# Patient Record
Sex: FEMALE | Race: WHITE | Employment: FULL TIME | URBAN - METROPOLITAN AREA
[De-identification: names, ages, dates, MRNs, and addresses within clinical notes are randomized per-mention and may not be internally consistent; named-entity substitution may affect disease eponyms.]

---

## 2019-04-17 ENCOUNTER — APPOINTMENT (OUTPATIENT)
Dept: GENERAL RADIOLOGY | Age: 59
DRG: 310 | End: 2019-04-17
Payer: COMMERCIAL

## 2019-04-17 ENCOUNTER — HOSPITAL ENCOUNTER (INPATIENT)
Age: 59
LOS: 1 days | Discharge: HOME | DRG: 310 | End: 2019-04-18
Attending: EMERGENCY MEDICINE | Admitting: HOSPITALIST
Payer: COMMERCIAL

## 2019-04-17 DIAGNOSIS — M25.531 RIGHT WRIST PAIN: ICD-10-CM

## 2019-04-17 DIAGNOSIS — R79.89 ELEVATED BRAIN NATRIURETIC PEPTIDE (BNP) LEVEL: ICD-10-CM

## 2019-04-17 DIAGNOSIS — M25.561 ACUTE PAIN OF RIGHT KNEE: ICD-10-CM

## 2019-04-17 DIAGNOSIS — T14.8XXA ABRASION: ICD-10-CM
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
## 2019-04-17 DIAGNOSIS — I48.91 ATRIAL FIBRILLATION WITH RVR (HCC): Primary | ICD-10-CM

## 2019-04-17 LAB
ALBUMIN SERPL-MCNC: 4.2 GM/DL (ref 3.4–5)
ALP BLD-CCNC: 76 IU/L (ref 40–129)
ALT SERPL-CCNC: 18 U/L (ref 10–40)
ANION GAP SERPL CALCULATED.3IONS-SCNC: 10 MMOL/L (ref 4–16)
AST SERPL-CCNC: 23 IU/L (ref 15–37)
BASOPHILS ABSOLUTE: 0 K/CU MM
BASOPHILS RELATIVE PERCENT: 0.3 % (ref 0–1)
BILIRUB SERPL-MCNC: 0.6 MG/DL (ref 0–1)
BUN BLDV-MCNC: 17 MG/DL (ref 6–23)
CALCIUM SERPL-MCNC: 9.5 MG/DL (ref 8.3–10.6)
CHLORIDE BLD-SCNC: 102 MMOL/L (ref 99–110)
CO2: 27 MMOL/L (ref 21–32)
CREAT SERPL-MCNC: 0.8 MG/DL (ref 0.6–1.1)
DIFFERENTIAL TYPE: ABNORMAL
EOSINOPHILS ABSOLUTE: 0.2 K/CU MM
EOSINOPHILS RELATIVE PERCENT: 2.1 % (ref 0–3)
GFR AFRICAN AMERICAN: >60 ML/MIN/1.73M2
GFR NON-AFRICAN AMERICAN: >60 ML/MIN/1.73M2
GLUCOSE BLD-MCNC: 109 MG/DL (ref 70–99)
HCT VFR BLD CALC: 50.9 % (ref 37–47)
HEMOGLOBIN: 16.1 GM/DL (ref 12.5–16)
IMMATURE NEUTROPHIL %: 0.4 % (ref 0–0.43)
LACTATE: 1.4 MMOL/L (ref 0.4–2)
LYMPHOCYTES ABSOLUTE: 2.7 K/CU MM
LYMPHOCYTES RELATIVE PERCENT: 27.8 % (ref 24–44)
MAGNESIUM: 2.1 MG/DL (ref 1.8–2.4)
MCH RBC QN AUTO: 29.6 PG (ref 27–31)
MCHC RBC AUTO-ENTMCNC: 31.6 % (ref 32–36)
MCV RBC AUTO: 93.6 FL (ref 78–100)
MONOCYTES ABSOLUTE: 0.8 K/CU MM
MONOCYTES RELATIVE PERCENT: 8.3 % (ref 0–4)
NUCLEATED RBC %: 0 %
PDW BLD-RTO: 12.2 % (ref 11.7–14.9)
PLATELET # BLD: 257 K/CU MM (ref 140–440)
PMV BLD AUTO: 10 FL (ref 7.5–11.1)
POTASSIUM SERPL-SCNC: 4.5 MMOL/L (ref 3.5–5.1)
PRO-BNP: 1364 PG/ML
RBC # BLD: 5.44 M/CU MM (ref 4.2–5.4)
SEGMENTED NEUTROPHILS ABSOLUTE COUNT: 5.9 K/CU MM
SEGMENTED NEUTROPHILS RELATIVE PERCENT: 61.1 % (ref 36–66)
SODIUM BLD-SCNC: 139 MMOL/L (ref 135–145)
T4 FREE: 1.09 NG/DL (ref 0.9–1.8)
TOTAL CK: 150 IU/L (ref 26–140)
TOTAL IMMATURE NEUTOROPHIL: 0.04 K/CU MM
TOTAL NUCLEATED RBC: 0 K/CU MM
TOTAL PROTEIN: 7.7 GM/DL (ref 6.4–8.2)
TROPONIN T: <0.01 NG/ML
TROPONIN T: <0.01 NG/ML
TSH HIGH SENSITIVITY: 2.62 UIU/ML (ref 0.27–4.2)
WBC # BLD: 9.6 K/CU MM (ref 4–10.5)

## 2019-04-17 PROCEDURE — 96366 THER/PROPH/DIAG IV INF ADDON: CPT

## 2019-04-17 PROCEDURE — 2580000003 HC RX 258: Performed by: NURSE PRACTITIONER

## 2019-04-17 PROCEDURE — 83735 ASSAY OF MAGNESIUM: CPT

## 2019-04-17 PROCEDURE — 6370000000 HC RX 637 (ALT 250 FOR IP): Performed by: EMERGENCY MEDICINE

## 2019-04-17 PROCEDURE — 2500000003 HC RX 250 WO HCPCS: Performed by: EMERGENCY MEDICINE

## 2019-04-17 PROCEDURE — 85025 COMPLETE CBC W/AUTO DIFF WBC: CPT

## 2019-04-17 PROCEDURE — 80053 COMPREHEN METABOLIC PANEL: CPT

## 2019-04-17 PROCEDURE — 84443 ASSAY THYROID STIM HORMONE: CPT

## 2019-04-17 PROCEDURE — 84484 ASSAY OF TROPONIN QUANT: CPT

## 2019-04-17 PROCEDURE — 6360000002 HC RX W HCPCS: Performed by: NURSE PRACTITIONER

## 2019-04-17 PROCEDURE — 83605 ASSAY OF LACTIC ACID: CPT

## 2019-04-17 PROCEDURE — 83880 ASSAY OF NATRIURETIC PEPTIDE: CPT

## 2019-04-17 PROCEDURE — 36415 COLL VENOUS BLD VENIPUNCTURE: CPT

## 2019-04-17 PROCEDURE — 6360000002 HC RX W HCPCS: Performed by: EMERGENCY MEDICINE

## 2019-04-17 PROCEDURE — 90471 IMMUNIZATION ADMIN: CPT | Performed by: EMERGENCY MEDICINE

## 2019-04-17 PROCEDURE — 93005 ELECTROCARDIOGRAM TRACING: CPT | Performed by: EMERGENCY MEDICINE

## 2019-04-17 PROCEDURE — 99285 EMERGENCY DEPT VISIT HI MDM: CPT

## 2019-04-17 PROCEDURE — 84439 ASSAY OF FREE THYROXINE: CPT

## 2019-04-17 PROCEDURE — 82550 ASSAY OF CK (CPK): CPT

## 2019-04-17 PROCEDURE — 73090 X-RAY EXAM OF FOREARM: CPT

## 2019-04-17 PROCEDURE — 73130 X-RAY EXAM OF HAND: CPT

## 2019-04-17 PROCEDURE — 90715 TDAP VACCINE 7 YRS/> IM: CPT | Performed by: EMERGENCY MEDICINE

## 2019-04-17 PROCEDURE — 2140000000 HC CCU INTERMEDIATE R&B

## 2019-04-17 PROCEDURE — 96365 THER/PROPH/DIAG IV INF INIT: CPT

## 2019-04-17 PROCEDURE — 2580000003 HC RX 258: Performed by: EMERGENCY MEDICINE

## 2019-04-17 PROCEDURE — 73560 X-RAY EXAM OF KNEE 1 OR 2: CPT

## 2019-04-17 PROCEDURE — 71045 X-RAY EXAM CHEST 1 VIEW: CPT

## 2019-04-17 RX ORDER — DILTIAZEM HYDROCHLORIDE 5 MG/ML
10 INJECTION INTRAVENOUS ONCE
Status: COMPLETED | OUTPATIENT
Start: 2019-04-17 | End: 2019-04-17

## 2019-04-17 RX ORDER — ACETAMINOPHEN 325 MG/1
650 TABLET ORAL EVERY 4 HOURS PRN
Status: DISCONTINUED | OUTPATIENT
Start: 2019-04-17 | End: 2019-04-18 | Stop reason: HOSPADM

## 2019-04-17 RX ORDER — 0.9 % SODIUM CHLORIDE 0.9 %
1000 INTRAVENOUS SOLUTION INTRAVENOUS ONCE
Status: COMPLETED | OUTPATIENT
Start: 2019-04-17 | End: 2019-04-17

## 2019-04-17 RX ORDER — ONDANSETRON 2 MG/ML
4 INJECTION INTRAMUSCULAR; INTRAVENOUS EVERY 6 HOURS PRN
Status: DISCONTINUED | OUTPATIENT
Start: 2019-04-17 | End: 2019-04-18 | Stop reason: HOSPADM

## 2019-04-17 RX ORDER — ACETAMINOPHEN 500 MG
1000 TABLET ORAL ONCE
Status: COMPLETED | OUTPATIENT
Start: 2019-04-17 | End: 2019-04-17

## 2019-04-17 RX ORDER — DILTIAZEM HYDROCHLORIDE 5 MG/ML
10 INJECTION INTRAVENOUS ONCE
Status: DISCONTINUED | OUTPATIENT
Start: 2019-04-17 | End: 2019-04-17 | Stop reason: CLARIF

## 2019-04-17 RX ORDER — SODIUM CHLORIDE 0.9 % (FLUSH) 0.9 %
10 SYRINGE (ML) INJECTION PRN
Status: DISCONTINUED | OUTPATIENT
Start: 2019-04-17 | End: 2019-04-18 | Stop reason: HOSPADM

## 2019-04-17 RX ORDER — SODIUM CHLORIDE 0.9 % (FLUSH) 0.9 %
10 SYRINGE (ML) INJECTION EVERY 12 HOURS SCHEDULED
Status: DISCONTINUED | OUTPATIENT
Start: 2019-04-17 | End: 2019-04-18 | Stop reason: HOSPADM

## 2019-04-17 RX ORDER — ONDANSETRON 2 MG/ML
4 INJECTION INTRAMUSCULAR; INTRAVENOUS EVERY 30 MIN PRN
Status: DISCONTINUED | OUTPATIENT
Start: 2019-04-17 | End: 2019-04-17 | Stop reason: SDUPTHER

## 2019-04-17 RX ADMIN — ACETAMINOPHEN 1000 MG: 500 TABLET ORAL at 16:24

## 2019-04-17 RX ADMIN — DILTIAZEM HYDROCHLORIDE 5 MG/HR: 5 INJECTION INTRAVENOUS at 16:38

## 2019-04-17 RX ADMIN — DILTIAZEM HYDROCHLORIDE 10 MG: 5 INJECTION INTRAVENOUS at 16:27

## 2019-04-17 RX ADMIN — SODIUM CHLORIDE, PRESERVATIVE FREE 10 ML: 5 INJECTION INTRAVENOUS at 20:39

## 2019-04-17 RX ADMIN — TETANUS TOXOID, REDUCED DIPHTHERIA TOXOID AND ACELLULAR PERTUSSIS VACCINE, ADSORBED 0.5 ML: 5; 2.5; 8; 8; 2.5 SUSPENSION INTRAMUSCULAR at 16:32

## 2019-04-17 RX ADMIN — SODIUM CHLORIDE 1000 ML: 9 INJECTION, SOLUTION INTRAVENOUS at 17:56

## 2019-04-17 RX ADMIN — SODIUM CHLORIDE 1000 ML: 9 INJECTION, SOLUTION INTRAVENOUS at 16:24

## 2019-04-17 RX ADMIN — ENOXAPARIN SODIUM 40 MG: 40 INJECTION SUBCUTANEOUS at 20:38

## 2019-04-17 SDOH — HEALTH STABILITY: MENTAL HEALTH: HOW OFTEN DO YOU HAVE A DRINK CONTAINING ALCOHOL?: NEVER

## 2019-04-17 ASSESSMENT — PAIN DESCRIPTION - PROGRESSION
CLINICAL_PROGRESSION: GRADUALLY IMPROVING
CLINICAL_PROGRESSION: GRADUALLY IMPROVING

## 2019-04-17 ASSESSMENT — PAIN DESCRIPTION - LOCATION
LOCATION: WRIST

## 2019-04-17 ASSESSMENT — PAIN DESCRIPTION - PAIN TYPE
TYPE: ACUTE PAIN

## 2019-04-17 ASSESSMENT — PAIN SCALES - GENERAL
PAINLEVEL_OUTOF10: 2
PAINLEVEL_OUTOF10: 5
PAINLEVEL_OUTOF10: 4
PAINLEVEL_OUTOF10: 5

## 2019-04-17 ASSESSMENT — PAIN DESCRIPTION - DESCRIPTORS
DESCRIPTORS: ACHING

## 2019-04-17 ASSESSMENT — PAIN DESCRIPTION - ORIENTATION
ORIENTATION: RIGHT

## 2019-04-17 ASSESSMENT — ENCOUNTER SYMPTOMS
ALLERGIC/IMMUNOLOGIC NEGATIVE: 1
RESPIRATORY NEGATIVE: 1
EYES NEGATIVE: 1
GASTROINTESTINAL NEGATIVE: 1

## 2019-04-17 ASSESSMENT — PAIN DESCRIPTION - FREQUENCY
FREQUENCY: CONTINUOUS
FREQUENCY: CONTINUOUS

## 2019-04-17 NOTE — ED PROVIDER NOTES
[facility]      TRIAGE CHIEF COMPLAINT:   Other (pt sent from Urgent care due to being in A.fib with RVR; pt denying symptoms such as chest pain, palpitations or shortness of breath, pt states she has history of a.fib but has not been on any medications since July; pt states, \"I had lost my meds in [facility] and just stopped taking it. \" Pt states she was being seen at urgent care due to a previous fall and right wrist pain)      [place]:  Maverick Tariq is a 62 y.o. female that presents with complaint of fall, palpitations. Patient has not been compliant with her Cardizem for a while she states she fell today injuring her right knee and right wrist and hand. Sent here by urgent care due to abnormal heart rhythm. Patient denies any fevers chest pain short of breath nausea vomiting diarrhea headache loss of consciousness blood thinners neck pain back pain other questions or concerns. REVIEW OF SYSTEMS:  At least 10 systems reviewed and otherwise acutely negative except as in the [address] Review of Systems   Constitutional: Negative. HENT: Negative. Eyes: Negative. Respiratory: Negative. Cardiovascular: Positive for palpitations. Gastrointestinal: Negative. Endocrine: Negative. Genitourinary: Negative. Musculoskeletal: Positive for arthralgias and myalgias. Skin: Positive for wound. Allergic/Immunologic: Negative. Neurological: Negative. Hematological: Negative. Psychiatric/Behavioral: Negative. All other systems reviewed and are negative. Past Medical History:   Diagnosis Date    Atrial fibrillation ([address]     Hyperlipidemia      Past Surgical History:   Procedure Laterality Date    APPENDECTOMY       SECTION      CHOLECYSTECTOMY      HYSTERECTOMY       History reviewed. No pertinent family history.   Social History     Socioeconomic History    Marital status:      Spouse name: Not on file    Number of children: Not on file    Years of education: Not on file    Highest education level: Not on file   Occupational History    Not on file   Social Needs    Financial resource strain: Not on file    Food insecurity:     Worry: Not on file     Inability: Not on file    Transportation needs:     Medical: Not on file     Non-medical: Not on file   Tobacco Use    Smoking status: Never Smoker    Smokeless tobacco: Never Used   Substance and Sexual Activity    Alcohol use: Never     Frequency: Never    Drug use: Never    Sexual activity: Not on file   Lifestyle    Physical activity:     Days per week: Not on file     Minutes per session: Not on file    Stress: Not on file   Relationships    Social connections:     Talks on phone: Not on file     Gets together: Not on file     Attends Mormonism service: Not on file     Active member of club or organization: Not on file     Attends meetings of clubs or organizations: Not on file     Relationship status: Not on file    Intimate partner violence:     Fear of current or ex partner: Not on file     Emotionally abused: Not on file     Physically abused: Not on file     Forced sexual activity: Not on file   Other Topics Concern    Not on file   Social History Narrative    Not on file     Current Facility-Administered Medications   Medication Dose Route Frequency Provider Last Rate Last Dose    0.9 % sodium chloride bolus  1,000 mL Intravenous Once Darlina China, DO        0.9 % sodium chloride bolus  1,000 mL Intravenous Once Darlina China, DO 1,000 mL/hr at 04/17/19 1624 1,000 mL at 04/17/19 1624    ondansetron (ZOFRAN) injection 4 mg  4 mg Intravenous Q30 Min PRN Darlina China, DO        diltiazem 125 mg in dextrose 5 % 125 mL infusion  5 mg/hr Intravenous Continuous Justin Noble DO 5 mL/hr at 04/17/19 1638 5 mg/hr at 04/17/19 1638     No current outpatient medications on file.       Allergies   Allergen Reactions    Codeine      Nausea and vomiting      Current She is alert and oriented to person, place, and time. She has normal strength. She is not disoriented. She displays no atrophy and no tremor. No cranial nerve deficit or sensory deficit. She exhibits normal muscle tone. She displays no seizure activity. GCS eye subscore is 4. GCS verbal subscore is 5. GCS motor subscore is 6. Skin: Abrasion noted. She is not diaphoretic. Vitals reviewed.         I have reviewed andinterpreted all of the currently available lab results from this visit (if applicable):    Results for orders placed or performed during the hospital encounter of 04/17/19   CBC auto diff   Result Value Ref Range    WBC 9.6 4.0 - 10.5 K/CU MM    RBC 5.44 (H) 4.2 - 5.4 M/CU MM    Hemoglobin 16.1 (H) 12.5 - 16.0 GM/DL    Hematocrit 50.9 (H) 37 - 47 %    MCV 93.6 78 - 100 FL    MCH 29.6 27 - 31 PG    MCHC 31.6 (L) 32.0 - 36.0 %    RDW 12.2 11.7 - 14.9 %    Platelets 492 995 - 502 K/CU MM    MPV 10.0 7.5 - 11.1 FL    Differential Type AUTOMATED DIFFERENTIAL     Segs Relative 61.1 36 - 66 %    Lymphocytes % 27.8 24 - 44 %    Monocytes % 8.3 (H) 0 - 4 %    Eosinophils % 2.1 0 - 3 %    Basophils % 0.3 0 - 1 %    Segs Absolute 5.9 K/CU MM    Lymphocytes # 2.7 K/CU MM    Monocytes # 0.8 K/CU MM    Eosinophils # 0.2 K/CU MM    Basophils # 0.0 K/CU MM    Nucleated RBC % 0.0 %    Total Nucleated RBC 0.0 K/CU MM    Total Immature Neutrophil 0.04 K/CU MM    Immature Neutrophil % 0.4 0 - 0.43 %   CMP   Result Value Ref Range    Sodium 139 135 - 145 MMOL/L    Potassium 4.5 3.5 - 5.1 MMOL/L    Chloride 102 99 - 110 mMol/L    CO2 27 21 - 32 MMOL/L    BUN 17 6 - 23 MG/DL    CREATININE 0.8 0.6 - 1.1 MG/DL    Glucose 109 (H) 70 - 99 MG/DL    Calcium 9.5 8.3 - 10.6 MG/DL    Alb 4.2 3.4 - 5.0 GM/DL    Total Protein 7.7 6.4 - 8.2 GM/DL    Total Bilirubin 0.6 0.0 - 1.0 MG/DL    ALT 18 10 - 40 U/L    AST 23 15 - 37 IU/L    Alkaline Phosphatase 76 40 - 129 IU/L    GFR Non-African American >60 >60 mL/min/1.73m2    GFR African American >60 >60 mL/min/1.73m2    Anion Gap 10 4 - 16   Troponin   Result Value Ref Range    Troponin T <0.010 <0.01 NG/ML   Lactic Acid, Plasma   Result Value Ref Range    Lactate 1.4 0.4 - 2.0 mMOL/L   CK   Result Value Ref Range    Total  (H) 26 - 140 IU/L   Magnesium   Result Value Ref Range    Magnesium 2.1 1.8 - 2.4 mg/dl   Brain Natriuretic Peptide   Result Value Ref Range    Pro-BNP 1,364 (H) <300 PG/ML   TSH without Reflex   Result Value Ref Range    TSH, High Sensitivity 2.620 0.270 - 4.20 uIu/ml   EKG 12 Lead   Result Value Ref Range    Ventricular Rate 164 BPM    Atrial Rate 119 BPM    QRS Duration 76 ms    Q-T Interval 290 ms    QTc Calculation (Bazett) 478 ms    R Axis 70 degrees    T Axis 51 degrees    Diagnosis       Atrial fibrillation with rapid ventricular response  Abnormal ECG  No previous ECGs available          Radiographs (if obtained):  [] The following radiograph was interpreted by myself in the absence of a radiologist:  [x] Radiologist's Report Reviewed:    CXR, R knee, R hand, forearm    EKG (if obtained): (All EKG's are interpreted by myself in the absence of a cardiologist)    12 lead EKG per my interpretation:  Atrial Fibrillation 164  Axis is   Normal  QTc is  478  There is no specific T wave changes appreciated. There is no specific ST wave changes appreciated. Prior EKG to compare with was not available     Total critical care time today provided was at least 30 minutes. This excludes seperately billable procedure. Critical care time provided for reviewing labs, images consulting medicine giving Cardizem, fluids that required close evaluation and/or intervention with concern for patient decompensation. MDM:    Patient here for fall, right wrist pain and right knee pain x-rays are negative patient given T dap. Also sent here from urgent care due to abnormal heart rhythm. She is in atrial fibrillation with RVR. She has not been taking her Cardizem for a while.   She has no other complaints. Labs x-ray negative except for BNP is elevated. Patient given fluids, Cardizem admitted for observation given patient information with RVR. CLINICAL IMPRESSION:  Final diagnoses:   Atrial fibrillation with RVR ([facility] [address]   Fall, initial encounter   Right wrist pain   Acute pain of right knee   Abrasion   Elevated brain natriuretic peptide (BNP) level       (Please note that portions of this note may have been completed with a voice recognition program. Efforts were made to edit the dictations but occasionally words aremis-transcribed.)    DISPOSITION REFERRAL (if applicable):  No follow-up provider specified.     DISPOSITION MEDICATIONS (if applicable):  New Prescriptions    No medications on file          Yulia Suh, 9 [facility],   04/17/19 2740

## 2019-04-17 NOTE — H&P
History and Physical      Name:  Ward Morgan /Age/Sex: 1960  (62 y.o. female)   MRN & CSN:  [id number] & [id number] Admission Date/Time: 2019  2:55 PM   Location:  ED29/ED-29 PCP: Diana 19 Day: 1        Admitting Physician: Dr. Hahn Self and Plan:   Ward Morgan is a 62 y.o. female who presents rapid heart rate.  Atrial fibrillation with RVR. Presenting ventricular rate 160s    Admit to Med/Surg   IV Cardizem   Telemetry/troponin trend   Consult cardiology. No current long term AC. [id number] Score: 1       Wrist inury, right- 2/2 mechanical fall- imaging non acute    PRN ICE   PRN NSAIDs       Diet Cardiac   DVT Prophylaxis [x] Lovenox, []  Heparin, [] SCDs, [] Ambulation  [] Long term AC   GI Prophylaxis [] PPI,  [x] H2 Blocker,  [] Carafate,  [] Diet/Tube Feeds   Code Status Full     Disposition Admit to inpatient. Patient plans to return home upon discharge   MDM [] Low, [x] Moderate,[]  High     -Patient assessment and plan discussed and reviewed with admitting physician: Dominik Gutierres MD.       History of Present Illness:     Chief Complaint: rapid heart rate    Ward Morgan is a 62 y.o. female who presents with from urgent care after fall. She was there to evaluation wrist injury but was noted to have rapid heart rate and sent directly to ED. She has hx of A Fib with initial diagnosis approx 20 years ago. Has been on Cardizem and stable since then with only 2 episodes of a fib known to the patient. She report work up and no previous evaluation by cardiology. She states stopped taking Cardizem/lovastatin/metformin approx 11 months ago d/t loosing pill bottles during travel. Ten point ROS: reviewed negative, unless as noted in above HPI.     Objective:   No intake or output data in the 24 hours ending 19 1657     Vitals:   Vitals:    19 1453 19 1612 19 1634 19 1650   BP: (!) 120/104 (!) 144/76 103/70 (!) 127/91   Pulse: 168 165 139 110   Resp:    Temp: 98.3 °F (36.8 °C)      TempSrc: Oral      SpO2: 96% 94% 95% 96%   Weight:       Height:           Physical Exam: 19     GEN -Awake nontoxic appearing female, sitting upright in bed , NAD. obese body habitus. Appears given age. EYES -Pupils are equally round. No scleral erythema, discharge, or conjunctivitis. HENT -MM are moist. Oral pharynx without exudates, no evidence of thrush. NECK -Supple, no apparent thyromegaly or masses. RESP -CTA, no wheezes, rales or rhonchi. Symmetric chest movement while on RA.   C/V -S1/S2 auscultated. IRR without appreciable M/R/G. No JVD or carotid bruits. Peripheral pulses equal bilaterally and palpable. Cap refill <3 sec. No peripheral edema. GI -Abdomen is soft non distended and without significant TTP. + BS. No masses or guarding. Rectal exam deferred.  -No CVA/ flank tenderness. Shabazz catheter is not present. LYMPH-No palpable cervical lymphadenopathy and no hepatosplenomegaly. No petechiae or ecchymoses. MS -No gross joint deformities. SKIN -Normal coloration, warm, dry. NEURO-Cranial nerves appear grossly intact, normal speech, no lateralizing weakness. PSYC-Awake, alert, oriented x 4- person, place, time, situation,  Appropriate affect. Past Medical History:      Past Medical History:   Diagnosis Date    Atrial fibrillation ([facility] [address]     Hyperlipidemia        Past Surgical  History:    has a past surgical history that includes Cholecystectomy; Appendectomy;  section; and Hysterectomy.     Social History:    FAM HX: Assessed mother CAD/ A fib    Soc HX:   Social History     Socioeconomic History    Marital status:      Spouse name: None    Number of children: None    Years of education: None    Highest education level: None   Occupational History    None   Social Needs    Financial resource strain: None    Food insecurity:     Worry: None     Inability: None    Transportation needs:     Medical: None     Non-medical: None   Tobacco Use    Smoking status: Never Smoker    Smokeless tobacco: Never Used   Substance and Sexual Activity    Alcohol use: Never     Frequency: Never    Drug use: Never    Sexual activity: None   Lifestyle    Physical activity:     Days per week: None     Minutes per session: None    Stress: None   Relationships    Social connections:     Talks on phone: None     Gets together: None     Attends Hinduism service: None     Active member of club or organization: None     Attends meetings of clubs or organizations: None     Relationship status: None    Intimate partner violence:     Fear of current or ex partner: None     Emotionally abused: None     Physically abused: None     Forced sexual activity: None   Other Topics Concern    None   Social History Narrative    None     TOBACCO:   reports that she has never smoked. She has never used smokeless tobacco.  ETOH:   reports that she does not drink alcohol. Drugs:  reports that she does not use drugs. Allergies: Allergies   Allergen Reactions    Codeine      Nausea and vomiting        Home Medications:     Prior to Admission medications    Not on File         Medications:   Medications:    sodium chloride  1,000 mL Intravenous Once    sodium chloride  1,000 mL Intravenous Once      Infusions:    diltiazem (CARDIZEM) 125 mg in dextrose 5% 125 mL infusion 5 mg/hr (04/17/19 1638)     PRN Meds:   ondansetron 4 mg Q30 Min PRN       Data:     Laboratory this visit:  Reviewed  Recent Labs     04/17/19  1505   WBC 9.6   HGB 16.1*   HCT 50.9*         Recent Labs     04/17/19  1505      K 4.5      CO2 27   BUN 17   CREATININE 0.8     Recent Labs     04/17/19  1505   AST 23   ALT 18   BILITOT 0.6   ALKPHOS 76     No results for input(s): INR in the last 72 hours. Recent Labs     04/17/19  1505   CKTOTAL 150*         Radiology this visit:  Reviewed.     Xr Radius Ulna Right (2 of acute fracture or dislocation. No focal osseous lesion. No evidence of joint effusion. No focal soft tissue abnormality. No acute abnormality of the knee. Xr Chest Portable    Result Date: 4/17/2019  EXAMINATION: SINGLE XRAY VIEW OF THE CHEST 4/17/2019 3:19 pm COMPARISON: None. HISTORY: ORDERING SYSTEM PROVIDED HISTORY: irregular rhythm TECHNOLOGIST PROVIDED HISTORY: Reason for exam:->irregular rhythm Ordering Physician Provided Reason for Exam: irregular rhythm Acuity: Unknown Type of Exam: Initial Additional signs and symptoms: none Relevant Medical/Surgical History: none FINDINGS: No lines or tubes. Normal cardiomediastinal silhouette. The lungs are clear without focal consolidation or pleural effusion. No suspicious pulmonary nodules. No pulmonary edema. No pneumothorax. No acute osseous abnormality. No acute cardiopulmonary disease.          EKG this visit:  Reviewed             Electronically signed by SARA Blair CNP on 4/17/2019 at 4:57 PM

## 2019-04-17 NOTE — LETTER
[address]  [facility] [address]  Phone: [phone]    No name on file. April 18, 2019     Patient: Arturo Woody   YOB: 1960   Date of Visit: 4/17/2019       To Whom It May Concern:    Patient hospitalized from 4/17/19- 4/18/19, can return to work on 4/20/19 without restrictions    If you have any questions or concerns, please don't hesitate to call.     Sincerely,

## 2019-04-17 NOTE — PROGRESS NOTES
Medication History  [facility]    Patient Name: Cuauhtemoc Rivera 1960     Medication history has been completed by: Virgil Falk CPhT    Source(s) of information: daughter     Primary Care Physician: Mayito Oconnor     [facility]    Allergies as of 04/17/2019 - never reviewed   Allergen Reaction Noted    Codeine  04/17/2019        Prior to Admission medications    Not on File     Other Comments:  Patient has not taken any meds since July 2018    To my knowledge the above medication history is accurate as of 4/17/2019 5:14 PM.   Virgil Falk CPhT   4/17/2019 5:14 PM

## 2019-04-17 NOTE — ED NOTES
Pt reports she tripped and fell this morning while walking outside. Pt c/o right hand/wrist pain and abrasion to right knee. Pt went to urgent care for injuries from fall and was noted to be in A. Fib with RVR. Pt then sent to ED. Pt denies any chest pain or SOB. Denies any palpitations. No deformities noted to right hand or wrist. Pt reports she was on Cardizem in the past but hasnt taken it since last July because her luggage was lost and it had her medication in it.  Pt states it took a month to get her luggage back and she never started taking her medication after that     Brando Leon, RN  04/17/19 1903

## 2019-04-17 NOTE — ED NOTES
Pt ambulated to bathroom with gait steady.  Ice pack provided for right hand pain/swelling s/p injury from fall earlier today     Sonal Guerra RN  04/17/19 9436

## 2019-04-17 NOTE — ED NOTES
[place] and lactic acid was drawn on patient  Patient placed on monitor and in gown     Lula Murray  04/17/19 0945

## 2019-04-18 ENCOUNTER — APPOINTMENT (OUTPATIENT)
Dept: NUCLEAR MEDICINE | Age: 59
DRG: 310 | End: 2019-04-18
Payer: COMMERCIAL

## 2019-04-18 VITALS
DIASTOLIC BLOOD PRESSURE: 74 MMHG | BODY MASS INDEX: 32.07 KG/M2 | WEIGHT: 204.3 LBS | HEIGHT: 67 IN | SYSTOLIC BLOOD PRESSURE: 112 MMHG | RESPIRATION RATE: 13 BRPM | HEART RATE: 83 BPM | OXYGEN SATURATION: 99 % | TEMPERATURE: 98.4 F

## 2019-04-18 LAB
ALBUMIN SERPL-MCNC: 3.7 GM/DL (ref 3.4–5)
ALP BLD-CCNC: 61 IU/L (ref 40–128)
ALT SERPL-CCNC: 16 U/L (ref 10–40)
ANION GAP SERPL CALCULATED.3IONS-SCNC: 10 MMOL/L (ref 4–16)
AST SERPL-CCNC: 17 IU/L (ref 15–37)
BILIRUB SERPL-MCNC: 0.7 MG/DL (ref 0–1)
BUN BLDV-MCNC: 17 MG/DL (ref 6–23)
CALCIUM SERPL-MCNC: 8.7 MG/DL (ref 8.3–10.6)
CHLORIDE BLD-SCNC: 106 MMOL/L (ref 99–110)
CO2: 25 MMOL/L (ref 21–32)
CREAT SERPL-MCNC: 0.8 MG/DL (ref 0.6–1.1)
GFR AFRICAN AMERICAN: >60 ML/MIN/1.73M2
GFR NON-AFRICAN AMERICAN: >60 ML/MIN/1.73M2
GLUCOSE BLD-MCNC: 108 MG/DL (ref 70–99)
LV EF: 33 %
LV EF: 50 %
LVEF MODALITY: NORMAL
LVEF MODALITY: NORMAL
MAGNESIUM: 2 MG/DL (ref 1.8–2.4)
POTASSIUM SERPL-SCNC: 4.7 MMOL/L (ref 3.5–5.1)
SODIUM BLD-SCNC: 141 MMOL/L (ref 135–145)
TOTAL PROTEIN: 5.9 GM/DL (ref 6.4–8.2)
TROPONIN T: <0.01 NG/ML

## 2019-04-18 PROCEDURE — 93017 CV STRESS TEST TRACING ONLY: CPT

## 2019-04-18 PROCEDURE — 83735 ASSAY OF MAGNESIUM: CPT

## 2019-04-18 PROCEDURE — A9500 TC99M SESTAMIBI: HCPCS | Performed by: INTERNAL MEDICINE

## 2019-04-18 PROCEDURE — 93010 ELECTROCARDIOGRAM REPORT: CPT | Performed by: INTERNAL MEDICINE

## 2019-04-18 PROCEDURE — 6370000000 HC RX 637 (ALT 250 FOR IP): Performed by: NURSE PRACTITIONER

## 2019-04-18 PROCEDURE — 6360000002 HC RX W HCPCS: Performed by: INTERNAL MEDICINE

## 2019-04-18 PROCEDURE — 78452 HT MUSCLE IMAGE SPECT MULT: CPT

## 2019-04-18 PROCEDURE — 2580000003 HC RX 258: Performed by: INTERNAL MEDICINE

## 2019-04-18 PROCEDURE — 6370000000 HC RX 637 (ALT 250 FOR IP): Performed by: INTERNAL MEDICINE

## 2019-04-18 PROCEDURE — 80053 COMPREHEN METABOLIC PANEL: CPT

## 2019-04-18 PROCEDURE — 2580000003 HC RX 258: Performed by: NURSE PRACTITIONER

## 2019-04-18 PROCEDURE — 99221 1ST HOSP IP/OBS SF/LOW 40: CPT | Performed by: INTERNAL MEDICINE

## 2019-04-18 PROCEDURE — 93306 TTE W/DOPPLER COMPLETE: CPT

## 2019-04-18 PROCEDURE — 2500000003 HC RX 250 WO HCPCS: Performed by: INTERNAL MEDICINE

## 2019-04-18 PROCEDURE — 6360000002 HC RX W HCPCS: Performed by: HOSPITALIST

## 2019-04-18 PROCEDURE — 3430000000 HC RX DIAGNOSTIC RADIOPHARMACEUTICAL: Performed by: INTERNAL MEDICINE

## 2019-04-18 RX ORDER — TRAMADOL HYDROCHLORIDE 50 MG/1
25 TABLET ORAL ONCE
Status: COMPLETED | OUTPATIENT
Start: 2019-04-18 | End: 2019-04-18

## 2019-04-18 RX ORDER — ACETAMINOPHEN 80 MG
TABLET,CHEWABLE ORAL
Status: DISCONTINUED
Start: 2019-04-18 | End: 2019-04-18 | Stop reason: HOSPADM

## 2019-04-18 RX ORDER — DILTIAZEM HYDROCHLORIDE 180 MG/1
180 CAPSULE, COATED, EXTENDED RELEASE ORAL DAILY
Qty: 30 CAPSULE | Refills: 3 | Status: SHIPPED | OUTPATIENT
Start: 2019-04-19 | End: 2019-05-03

## 2019-04-18 RX ORDER — DILTIAZEM HYDROCHLORIDE 5 MG/ML
10 INJECTION INTRAVENOUS ONCE
Status: COMPLETED | OUTPATIENT
Start: 2019-04-18 | End: 2019-04-18

## 2019-04-18 RX ORDER — KETOROLAC TROMETHAMINE 30 MG/ML
15 INJECTION, SOLUTION INTRAMUSCULAR; INTRAVENOUS EVERY 6 HOURS PRN
Status: DISCONTINUED | OUTPATIENT
Start: 2019-04-18 | End: 2019-04-18 | Stop reason: HOSPADM

## 2019-04-18 RX ORDER — DILTIAZEM HYDROCHLORIDE 180 MG/1
180 CAPSULE, COATED, EXTENDED RELEASE ORAL DAILY
Status: DISCONTINUED | OUTPATIENT
Start: 2019-04-18 | End: 2019-04-18 | Stop reason: HOSPADM

## 2019-04-18 RX ORDER — SODIUM CHLORIDE 9 MG/ML
INJECTION, SOLUTION INTRAVENOUS CONTINUOUS
Status: DISCONTINUED | OUTPATIENT
Start: 2019-04-18 | End: 2019-04-18 | Stop reason: HOSPADM

## 2019-04-18 RX ADMIN — ASPIRIN 325 MG: 325 TABLET, DELAYED RELEASE ORAL at 10:37

## 2019-04-18 RX ADMIN — DILTIAZEM HYDROCHLORIDE 180 MG: 180 CAPSULE, COATED, EXTENDED RELEASE ORAL at 10:36

## 2019-04-18 RX ADMIN — SODIUM CHLORIDE, PRESERVATIVE FREE 10 ML: 5 INJECTION INTRAVENOUS at 10:37

## 2019-04-18 RX ADMIN — SODIUM CHLORIDE: 9 INJECTION, SOLUTION INTRAVENOUS at 10:36

## 2019-04-18 RX ADMIN — TRAMADOL HYDROCHLORIDE 25 MG: 50 TABLET, FILM COATED ORAL at 07:05

## 2019-04-18 RX ADMIN — Medication 30 MILLICURIE: at 09:05

## 2019-04-18 RX ADMIN — KETOROLAC TROMETHAMINE 15 MG: 30 INJECTION, SOLUTION INTRAMUSCULAR at 11:23

## 2019-04-18 RX ADMIN — REGADENOSON 0.4 MG: 0.08 INJECTION, SOLUTION INTRAVENOUS at 09:02

## 2019-04-18 RX ADMIN — Medication 10 MILLICURIE: at 07:50

## 2019-04-18 RX ADMIN — DILTIAZEM HYDROCHLORIDE 10 MG: 5 INJECTION INTRAVENOUS at 09:24

## 2019-04-18 RX ADMIN — ACETAMINOPHEN 650 MG: 325 TABLET ORAL at 03:09

## 2019-04-18 ASSESSMENT — PAIN DESCRIPTION - ORIENTATION: ORIENTATION: RIGHT

## 2019-04-18 ASSESSMENT — PAIN DESCRIPTION - PROGRESSION: CLINICAL_PROGRESSION: GRADUALLY WORSENING

## 2019-04-18 ASSESSMENT — PAIN SCALES - GENERAL
PAINLEVEL_OUTOF10: 5
PAINLEVEL_OUTOF10: 4
PAINLEVEL_OUTOF10: 6
PAINLEVEL_OUTOF10: 0

## 2019-04-18 ASSESSMENT — PAIN DESCRIPTION - FREQUENCY: FREQUENCY: CONTINUOUS

## 2019-04-18 ASSESSMENT — PAIN DESCRIPTION - PAIN TYPE: TYPE: ACUTE PAIN

## 2019-04-18 ASSESSMENT — PAIN DESCRIPTION - LOCATION: LOCATION: WRIST

## 2019-04-18 ASSESSMENT — PAIN DESCRIPTION - DESCRIPTORS: DESCRIPTORS: ACHING;CONSTANT

## 2019-04-18 NOTE — DISCHARGE SUMMARY
tablet  Take 1 tablet by mouth daily             diltiazem (CARDIZEM CD) 180 MG extended release capsule  Take 1 capsule by mouth daily                 Objective Findings at Discharge:   /74   Pulse 83   Temp 98.4 °F (36.9 °C) (Oral)   Resp 13   Ht 5' 7\" (1.702 m)   Wt 204 lb 4.8 oz (92.7 kg)   SpO2 99%   BMI 32.00 kg/m²            PHYSICAL EXAM   GEN Awake female, laying in bed in no apparent distress. Appears given age. EYES Pupils are equally round. No scleral discharge  HENT Atraumatic and symmetric head  NECK No apparent thyromegaly  RESP Symmetric chest movement while on room air. CARDIO/VASC Peripheral pulses equal bilaterally and palpable. No peripheral edema. GI Abdomen is not distended. Rectal exam deferred.  Shabazz catheter is not present. HEME/LYMPH No petechiae or ecchymoses. MSK Spontaneous movement of BL upper extremities  SKIN Normal coloration, warm, dry. NEURO Cranial nerves appear grossly intact  PSYCH Awake, alert.     BMP/CBC  Recent Labs     04/17/19  1505 04/18/19  0413    141   K 4.5 4.7    106   CO2 27 25   BUN 17 17   CREATININE 0.8 0.8   WBC 9.6  --    HCT 50.9*  --      --      SIGNIFICANT IMAGING AND LABS:      Discharge Time of 31 minutes    Electronically signed by Soo Anderson MD on 4/18/2019 at 2:22 PM

## 2019-04-18 NOTE — PLAN OF CARE
Problem: Cardiac Output - Decreased:  Goal: Hemodynamic stability will improve  Description  Hemodynamic stability will improve  Outcome: Ongoing     Problem:  Activity:  Goal: Ability to tolerate increased activity will improve  Description  Ability to tolerate increased activity will improve  Outcome: Ongoing  Goal: Expression of feelings of increased energy will increase  Description  Expression of feelings of increased energy will increase  Outcome: Ongoing     Problem: Cardiac:  Goal: Ability to maintain an adequate cardiac output will improve  Description  Ability to maintain an adequate cardiac output will improve  Outcome: Ongoing  Goal: Complications related to the disease process, condition or treatment will be avoided or minimized  Description  Complications related to the disease process, condition or treatment will be avoided or minimized  Outcome: Ongoing     Problem: Coping:  Goal: Level of anxiety will decrease  Description  Level of anxiety will decrease  Outcome: Ongoing  Goal: General experience of comfort will improve  Description  General experience of comfort will improve  Outcome: Ongoing     Problem: Health Behavior:  Goal: Ability to manage health-related needs will improve  Description  Ability to manage health-related needs will improve  Outcome: Ongoing     Problem: Safety:  Goal: Ability to remain free from injury will improve  Description  Ability to remain free from injury will improve  Outcome: Ongoing  Goal: Will show no signs and symptoms of excessive bleeding  Description  Will show no signs and symptoms of excessive bleeding  Outcome: Ongoing     Problem: Infection:  Goal: Will remain free from infection  Description  Will remain free from infection  Outcome: Ongoing     Problem: Daily Care:  Goal: Daily care needs are met  Description  Daily care needs are met  Outcome: Ongoing     Problem: Pain:  Goal: Patient's pain/discomfort is manageable  Description  Patient's pain/discomfort is manageable  Outcome: Ongoing     Problem: Skin Integrity:  Goal: Skin integrity will stabilize  Description  Skin integrity will stabilize  Outcome: Ongoing     Problem: Discharge Planning:  Goal: Patients continuum of care needs are met  Description  Patients continuum of care needs are met  Outcome: Ongoing

## 2019-04-18 NOTE — CARE COORDINATION
LSW spoke with pt about discharge plans Pt lives with her  in a 1 story home. Pt denies using any DME or HC. Pt stated she is independent of all her ADLs. Pt still works full time. Pt has a PCP and can afford her meds. Pt denies any needs and plans home.

## 2019-04-18 NOTE — PROGRESS NOTES
Noted sleep apnea of patient > 25sec, O2 drops into the 70's but spontaneously returns to high 90's on room air.  Placed patient on 2 L O2 via NC.

## 2019-04-18 NOTE — CONSULTS
acetaminophen (TYLENOL) tablet 650 mg Q4H PRN   enoxaparin (LOVENOX) injection 40 mg Nightly     Current Facility-Administered Medications   Medication Dose Route Frequency Provider Last Rate Last Dose    pill splitter             diltiazem 125 mg in dextrose 5 % 125 mL infusion  5 mg/hr Intravenous Continuous Adelfo Ford DO   Stopped at 04/17/19 2322    sodium chloride flush 0.9 % injection 10 mL  10 mL Intravenous 2 times per day Morley Bence, APRN - CNP   10 mL at 04/17/19 2039    sodium chloride flush 0.9 % injection 10 mL  10 mL Intravenous PRN Morley Bence, APRN - CNP        magnesium hydroxide (MILK OF MAGNESIA) 400 MG/5ML suspension 30 mL  30 mL Oral Daily PRN Morley Bence, APRN - CNP        ondansetron [facility]) injection 4 mg  4 mg Intravenous Q6H PRN Morley Bence, APRN - ELLEN        acetaminophen (TYLENOL) tablet 650 mg  650 mg Oral Q4H PRN Morley Bence, APRN - CNP   650 mg at 04/18/19 0309    enoxaparin (LOVENOX) injection 40 mg  40 mg Subcutaneous Nightly Morley Bence, APRN - CNP   40 mg at 04/17/19 2038     Review of Systems:   · Constitutional: No Fever or Weight Loss   · Eyes: No Decreased Vision  · ENT: No Headaches, Hearing Loss or Vertigo  · Cardiovascular: No chest pain, dyspnea on exertion, palpitations or loss of consciousness  · Respiratory: No cough or wheezing    · Gastrointestinal: No abdominal pain, appetite loss, blood in stools, constipation, diarrhea or heartburn  · Genitourinary: No dysuria, trouble voiding, or hematuria  · Musculoskeletal:  No gait disturbance, weakness or joint complaints  · Integumentary: No rash or pruritis  · Neurological: No TIA or stroke symptoms  · Psychiatric: No anxiety or depression  · Endocrine: No malaise, fatigue or temperature intolerance  · Hematologic/Lymphatic: No bleeding problems, blood clots or swollen lymph nodes  · Allergic/Immunologic: No nasal congestion or hives  All systems negative except as marked.      ·   ·      Physical Examination:    Vitals:    04/18/19 0503   BP: 112/74   Pulse: 83   Resp: 13   Temp: 98.4 °F (36.9 °C)   SpO2: 99%      Wt Readings from Last 3 Encounters:   04/18/19 204 lb 4.8 oz (92.7 kg)     Body mass index is 32 kg/m². General Appearance:  No distress, conversant    Constitutional:  Well developed, Well nourished, No acute distress, Non-toxic appearance. HENT:  Normocephalic, Atraumatic, Bilateral external ears normal, Oropharynx moist, No oral exudates, Nose normal. Neck- Normal range of motion, No tenderness, Supple, No stridor,no apical-carotid delay, no carotid bruit  Eyes:  PERRL, EOMI, Conjunctiva normal, No discharge. Respiratory:  Normal breath sounds, No respiratory distress, No wheezing, No chest tenderness. ,no use of accessory muscles, diaphragm movement is normal  Cardiovascular: (PMI) apex non displaced,no lifts no thrills, no s3,no s4, Normal heart rate, Normal rhythm, No murmurs, No rubs, No gallops. Carotid arteries pulse and amplitude are normal no bruit, no abdominal bruit noted ( normal abdominal aorta ausculation), femoral arteries pulse and amplitude are normal no bruit, pedal pulses are normal  GI:  Bowel sounds normal, Soft, No tenderness, No masses, No pulsatile masses, no hepatosplenomegally, no bruits  : External genitalia appear normal, No masses or lesions. No discharge. No CVA tenderness. Musculoskeletal:  Intact distal pulses, No edema, No tenderness, No cyanosis, No clubbing. Good range of motion in all major joints. No tenderness to palpation or major deformities noted. Back- No tenderness. Integument:  Warm, Dry, No erythema, No rash. Skin: no rash, no ulcers  Lymphatic:  No lymphadenopathy noted. Neurologic:  Alert & oriented x 3, Normal motor function, Normal sensory function, No focal deficits noted.    Psychiatric:  Affect normal, Judgment normal, Mood normal.   Lab Review   Recent Labs     04/17/19  1505   WBC 9.6   HGB 16.1*   HCT 50.9*

## 2019-04-18 NOTE — PROGRESS NOTES
Perfect Serve sent to Dr. Douglas Zimmerman, patient is new consult for Afib with RVR up from ER at shift change. Cardizem gtt had been titrated down, SBP not sustaining @ 90/40's. Finally stopped gtt @ 2322. Awaiting further orders if any tonight.

## 2019-04-22 LAB
EKG ATRIAL RATE: 119 BPM
EKG DIAGNOSIS: NORMAL
EKG Q-T INTERVAL: 290 MS
EKG QRS DURATION: 76 MS
EKG QTC CALCULATION (BAZETT): 478 MS
EKG R AXIS: 70 DEGREES
EKG T AXIS: 51 DEGREES
EKG VENTRICULAR RATE: 164 BPM

## 2019-05-03 ENCOUNTER — OFFICE VISIT (OUTPATIENT)
Dept: CARDIOLOGY CLINIC | Age: 59
End: 2019-05-03
Payer: COMMERCIAL

## 2019-05-03 ENCOUNTER — NURSE ONLY (OUTPATIENT)
Dept: CARDIOLOGY CLINIC | Age: 59
End: 2019-05-03
Payer: COMMERCIAL

## 2019-05-03 VITALS
WEIGHT: 200.4 LBS | SYSTOLIC BLOOD PRESSURE: 138 MMHG | HEIGHT: 67 IN | HEART RATE: 107 BPM | BODY MASS INDEX: 31.45 KG/M2 | DIASTOLIC BLOOD PRESSURE: 80 MMHG

## 2019-05-03 DIAGNOSIS — I48.91 ATRIAL FIBRILLATION, UNSPECIFIED TYPE (HCC): ICD-10-CM

## 2019-05-03 DIAGNOSIS — I48.91 ATRIAL FIBRILLATION, UNSPECIFIED TYPE (HCC): Primary | ICD-10-CM

## 2019-05-03 PROCEDURE — 99213 OFFICE O/P EST LOW 20 MIN: CPT | Performed by: INTERNAL MEDICINE

## 2019-05-03 PROCEDURE — 93228 REMOTE 30 DAY ECG REV/REPORT: CPT | Performed by: INTERNAL MEDICINE

## 2019-05-03 PROCEDURE — 93000 ELECTROCARDIOGRAM COMPLETE: CPT | Performed by: INTERNAL MEDICINE

## 2019-05-03 RX ORDER — DILTIAZEM HYDROCHLORIDE 240 MG/1
240 CAPSULE, COATED, EXTENDED RELEASE ORAL DAILY
Qty: 180 CAPSULE | Refills: 3 | Status: ON HOLD | OUTPATIENT
Start: 2019-05-03 | End: 2019-09-08 | Stop reason: HOSPADM

## 2019-05-03 NOTE — PROGRESS NOTES
14 days e-cardio monitor placed.  # [id number]. Instructed patient on how to record the event and to call monitoring center at [phone] if any problems arise. Instructed patient to disconnect the lead wires from the electrodes before bathing or showering and reattach them afterwards. Instructed patient that the electrodes should be changed every 3 days or if they no longer adhere to the skin. Patient to mail package after the monitor has ended. Patient verbalized understanding.   Applied by [employer]

## 2019-05-03 NOTE — PROGRESS NOTES
Enedelia Garcia MD        OFFICE  FOLLOWUP NOTE    Chief complaints: patient is here for management of afib, obesity, dyslipidemia    Subjective: patient feels better, no chest pain, no shortness of breath, no dizziness, no palpitations    HPI Ayesha Thompson is a 62 y. o.year old who  has a past medical history of Atrial fibrillation ([facility] [address] and Hyperlipidemia. and presents for management of  afib, obesity, dyslipidemia which are well controlled, she is here for follow up after hostpial discharge, she was trreated with aspirin and cardizem for her afib ( she was not cardioverted as her heart rate was controlled), however, today her heart rate is 117, she has noticed normal heart rate at home and thinks its her anxiety which is causing fast heart rate,      Current Outpatient Medications   Medication Sig Dispense Refill    diltiazem (CARDIZEM CD) 240 MG extended release capsule Take 1 capsule by mouth daily 180 capsule 3    aspirin 325 MG EC tablet Take 1 tablet by mouth daily 30 tablet 3     No current facility-administered medications for this visit.       Allergies: Codeine  Past Medical History:   Diagnosis Date    Atrial fibrillation ([facility] [address]     Hyperlipidemia      Past Surgical History:   Procedure Laterality Date    APPENDECTOMY       SECTION      CHOLECYSTECTOMY      HYSTERECTOMY       Family History   Problem Relation Age of Onset    High Cholesterol Mother     Heart Surgery Mother     Hypertension Father      Social History     Tobacco Use    Smoking status: Never Smoker    Smokeless tobacco: Never Used   Substance Use Topics    Alcohol use: Never     Frequency: Never      [unfilled]  Review of Systems:   · Constitutional: No Fever or Weight Loss   · Eyes: No Decreased Vision  · ENT: No Headaches, Hearing Loss or Vertigo  · Cardiovascular: No chest pain, dyspnea on exertion, palpitations or loss of consciousness  · Respiratory: No cough or wheezing    · Gastrointestinal: No abdominal pain, appetite loss, blood in stools, constipation, diarrhea or heartburn  · Genitourinary: No dysuria, trouble voiding, or hematuria  · Musculoskeletal:  No gait disturbance, weakness or joint complaints  · Integumentary: No rash or pruritis  · Neurological: No TIA or stroke symptoms  · Psychiatric: No anxiety or depression  · Endocrine: No malaise, fatigue or temperature intolerance  · Hematologic/Lymphatic: No bleeding problems, blood clots or swollen lymph nodes  · Allergic/Immunologic: No nasal congestion or hives  All systems negative except as marked. Objective:  /80   Pulse 107   Ht 5' 7\" (1.702 m)   Wt 200 lb 6.4 oz (90.9 kg)   BMI 31.39 kg/m²   Wt Readings from Last 3 Encounters:   05/03/19 200 lb 6.4 oz (90.9 kg)   04/18/19 204 lb 4.8 oz (92.7 kg)     Body mass index is 31.39 kg/m². GENERAL - Alert, oriented, pleasant, in no apparent distress,normal grooming  HEENT - pupils are reactive to light and accomodation, cornea intact, conjunctive normal color, ears are normal in exam,throat exam in normal, teeth, gum and palate are normal, oral mucosa is normal without any notation of pallor or cyanosis  Neck - Supple. No jugular venous distention noted. No carotid bruits, no apical -carotid delay  Respiratory:  Normal breath sounds, No respiratory distress, No wheezing, No chest tenderness. ,no use of accessory muscles, diaphragm movement is normal  Cardiovascular: (PMI) apex non displaced,no lifts no thrills, no s3,no s4, Normal heart rate, Normal rhythm, No murmurs, No rubs, No gallops.  Carotid arteries pulse and amplitude are normal no bruit, no abdominal bruit noted ( normal abdominal aorta ausculation), femoral arteries pulse and amplitude are normal no bruit, pedal pulses are normal  Femoral pulses have normal amplitude, no bruits   Extremities - No cyanosis, clubbing, or significant edema, no varicose veins    Abdomen - No masses, tenderness, or organomegaly, no hepato-splenomegally, no bruits  Musculoskeletal - No significant edema, no kyphosis or scoliosis, no deformity in any extremity noted, muscle strength and tone are normal  Skin: no ulcer,no scar,no stasis dermatitis   Neurologic - alert oriented times 3,Cranial nerves II through XII are grossly intact. There were no gross focal neurologic abnormalities. All sensory and motor nerves examined and were normal  Psychiatric: normal mood and affect    Lab Results   Component Value Date    CKTOTAL 150 04/17/2019     BNP:  No results found for: BNP  PT/INR:  No results found for: PTINR  No results found for: LABA1C  Lab Results   Component Value Date    CHOL 223 (H) 12/18/2012    TRIG 207 (H) 12/18/2012    HDL 55 (L) 12/18/2012    LDLCALC 127 (H) 12/18/2012     Lab Results   Component Value Date    ALT 16 04/18/2019    AST 17 04/18/2019     TSH:  No results found for: TSH    Impression:  Ayesha Thompson is a 62 y. o.year old who  has a past medical history of Atrial fibrillation ([address] and Hyperlipidemia. and presents with     Plan:  1. Afib: heart rate not controlled, increase cardizem to 240 mg daily, continue aspirin, 2 we[employer] event monitor ordered  2. Dyslipodemia: stable, off statins, check lipids  3. Sleep apnea: recommend to get full sleep study  4. Obesity/l recommend to lose weight      1. Health maintenance: exerise and diet  All labs, medications and tests reviewed, continue all other medications of all above medical condition listed as is.     @[facility]@

## 2019-05-03 NOTE — PROGRESS NOTES
[id number] Score for Atrial Fibrillation Stroke Risk   Risk   Factors  Component Value   C CHF No 0   H HTN No 0   A2 Age >= 76 No,  (60 y.o.) 0   D DM No 0   S2 Prior Stroke/TIA No 0   V Vascular Disease No 0   A Age 74-69 No,  (60 y.o.) 0   Sc Sex female 1    [id number]  Score  1   Score last updated 8/6/48 81:59 AM    Click here for a link to the UpToDate guideline \"Atrial Fibrillation: Anticoagulation therapy to prevent embolization    Disclaimer: Risk Score calculation is dependent on accuracy of patient problem list and past encounter diagnosis.

## 2019-05-07 ENCOUNTER — HOSPITAL ENCOUNTER (OUTPATIENT)
Age: 59
Discharge: HOME | End: 2019-05-07
Payer: COMMERCIAL

## 2019-05-07 LAB
ALBUMIN SERPL-MCNC: 4.3 GM/DL (ref 3.4–5)
ALP BLD-CCNC: 83 IU/L (ref 40–129)
ALT SERPL-CCNC: 20 U/L (ref 10–40)
AST SERPL-CCNC: 18 IU/L (ref 15–37)
BILIRUB SERPL-MCNC: 0.6 MG/DL (ref 0–1)
BILIRUBIN DIRECT: 0.2 MG/DL (ref 0–0.3)
BILIRUBIN, INDIRECT: 0.4 MG/DL (ref 0–0.7)
CHOLESTEROL: 216 MG/DL
HDLC SERPL-MCNC: 63 MG/DL
LDL CHOLESTEROL DIRECT: 150 MG/DL
TOTAL PROTEIN: 7.1 GM/DL (ref 6.4–8.2)
TRIGL SERPL-MCNC: 157 MG/DL

## 2019-05-07 PROCEDURE — 36415 COLL VENOUS BLD VENIPUNCTURE: CPT

## 2019-05-07 PROCEDURE — 80061 LIPID PANEL: CPT

## 2019-05-07 PROCEDURE — 83721 ASSAY OF BLOOD LIPOPROTEIN: CPT

## 2019-05-07 PROCEDURE — 80076 HEPATIC FUNCTION PANEL: CPT

## 2019-05-10 ENCOUNTER — TELEPHONE (OUTPATIENT)
Dept: CARDIOLOGY CLINIC | Age: 59
End: 2019-05-10

## 2019-05-10 NOTE — TELEPHONE ENCOUNTER
Marquise Fletcher reporting Serious Event at 10:00 am this morning on pt, of A-fib w/RVR, .  Noted and will have addressed

## 2019-05-10 NOTE — TELEPHONE ENCOUNTER
Pt returned my call and was advised of her monitor event and Dr. Serene Hidalgo wanting her to come in sooner.  Pt verbalized understanding and was scheduled for 5/13/19 @ 4:00

## 2019-05-13 ENCOUNTER — OFFICE VISIT (OUTPATIENT)
Dept: CARDIOLOGY CLINIC | Age: 59
End: 2019-05-13
Payer: COMMERCIAL

## 2019-05-13 VITALS
WEIGHT: 202 LBS | BODY MASS INDEX: 31.71 KG/M2 | HEIGHT: 67 IN | HEART RATE: 72 BPM | SYSTOLIC BLOOD PRESSURE: 116 MMHG | DIASTOLIC BLOOD PRESSURE: 70 MMHG

## 2019-05-13 DIAGNOSIS — I48.0 PAF (PAROXYSMAL ATRIAL FIBRILLATION) (HCC): Primary | ICD-10-CM

## 2019-05-13 PROCEDURE — 99213 OFFICE O/P EST LOW 20 MIN: CPT | Performed by: INTERNAL MEDICINE

## 2019-05-13 NOTE — PROGRESS NOTES
Enedelia Garcia MD        OFFICE  FOLLOWUP NOTE    Chief complaints: patient is here for management of aflutter/afib, DYSLIPDIEMIA    Subjective: patient FEELS tired      HPI Ayesha Thompson is a 62 y. o.year old who  has a past medical history of Atrial fibrillation ([facility] [address] and Hyperlipidemia. and presents for management of aflutter/afib, DYSLIPDIEMIA which are well controlled, her cardizem was increased to 240 mg cardizem ,she felt tired. Current Outpatient Medications   Medication Sig Dispense Refill    diltiazem (CARDIZEM CD) 240 MG extended release capsule Take 1 capsule by mouth daily 180 capsule 3    aspirin 325 MG EC tablet Take 1 tablet by mouth daily 30 tablet 3     No current facility-administered medications for this visit.       Allergies: Codeine  Past Medical History:   Diagnosis Date    Atrial fibrillation ([facility] [address]     Hyperlipidemia      Past Surgical History:   Procedure Laterality Date    APPENDECTOMY       SECTION      CHOLECYSTECTOMY      HYSTERECTOMY       Family History   Problem Relation Age of Onset    High Cholesterol Mother     Heart Surgery Mother     Hypertension Father      Social History     Tobacco Use    Smoking status: Never Smoker    Smokeless tobacco: Never Used   Substance Use Topics    Alcohol use: Never     Frequency: Never      @[facility]@  Review of Systems:   · Constitutional: No Fever or Weight Loss   · Eyes: No Decreased Vision  · ENT: No Headaches, Hearing Loss or Vertigo  · Cardiovascular: No chest pain, dyspnea on exertion, palpitations or loss of consciousness  · Respiratory: No cough or wheezing    · Gastrointestinal: No abdominal pain, appetite loss, blood in stools, constipation, diarrhea or heartburn  · Genitourinary: No dysuria, trouble voiding, or hematuria  · Musculoskeletal:  No gait disturbance, weakness or joint complaints  · Integumentary: No rash or pruritis  · Neurological: No TIA or stroke symptoms  · Psychiatric: No anxiety or depression  · Endocrine: No malaise, fatigue or temperature intolerance  · Hematologic/Lymphatic: No bleeding problems, blood clots or swollen lymph nodes  · Allergic/Immunologic: No nasal congestion or hives  All systems negative except as marked. Objective:  /70 (Site: Left Upper Arm, Position: Sitting, Cuff Size: Medium Adult)   Pulse 72   Ht 5' 7\" (1.702 m)   Wt 202 lb (91.6 kg)   BMI 31.64 kg/m²   Wt Readings from Last 3 Encounters:   05/13/19 202 lb (91.6 kg)   05/03/19 200 lb 6.4 oz (90.9 kg)   04/18/19 204 lb 4.8 oz (92.7 kg)     Body mass index is 31.64 kg/m². GENERAL - Alert, oriented, pleasant, in no apparent distress,normal grooming  HEENT - pupils are reactive to light and accomodation, cornea intact, conjunctive normal color, ears are normal in exam,throat exam in normal, teeth, gum and palate are normal, oral mucosa is normal without any notation of pallor or cyanosis  Neck - Supple. No jugular venous distention noted. No carotid bruits, no apical -carotid delay  Respiratory:  Normal breath sounds, No respiratory distress, No wheezing, No chest tenderness. ,no use of accessory muscles, diaphragm movement is normal  Cardiovascular: (PMI) apex non displaced,no lifts no thrills, no s3,no s4, Normal heart rate, Normal rhythm, No murmurs, No rubs, No gallops.  Carotid arteries pulse and amplitude are normal no bruit, no abdominal bruit noted ( normal abdominal aorta ausculation), femoral arteries pulse and amplitude are normal no bruit, pedal pulses are normal  Femoral pulses have normal amplitude, no bruits   Extremities - No cyanosis, clubbing, or significant edema, no varicose veins    Abdomen - No masses, tenderness, or organomegaly, no hepato-splenomegally, no bruits  Musculoskeletal - No significant edema, no kyphosis or scoliosis, no deformity in any extremity noted, muscle strength and tone are normal  Skin: no ulcer,no scar,no stasis dermatitis   Neurologic - alert oriented times 3,Cranial nerves II through XII are grossly intact. There were no gross focal neurologic abnormalities. All sensory and motor nerves examined and were normal  Psychiatric: normal mood and affect    Lab Results   Component Value Date    CKTOTAL 150 04/17/2019     BNP:  No results found for: BNP  PT/INR:  No results found for: PTINR  No results found for: LABA1C  Lab Results   Component Value Date    CHOL 216 (H) 05/07/2019    TRIG 157 (H) 05/07/2019    HDL 63 05/07/2019    LDLCALC 127 (H) 12/18/2012    LDLDIRECT 150 (H) 05/07/2019     Lab Results   Component Value Date    ALT 20 05/07/2019    AST 18 05/07/2019     TSH:  No results found for: TSH    Impression:  Mary Tran is a 62 y. o.year old who  has a past medical history of Atrial fibrillation ([address] and Hyperlipidemia. and presents with     Plan:  1. Aflutter/afib: controlled on 240 mg cardizem, she feels tired, option for cardioversion given to patient, will followup , she can stop event monitor  2. Dyslipidemia: stable. 3. Health maintenance: exerise and diet  All labs, medications and tests reviewed, continue all other medications of all above medical condition listed as is.     [unfilled]

## 2019-05-24 ENCOUNTER — TELEPHONE (OUTPATIENT)
Dept: CARDIOLOGY CLINIC | Age: 59
End: 2019-05-24

## 2019-05-24 NOTE — TELEPHONE ENCOUNTER
Advised pt of monitor results and Dr. Castillo Riley need to discuss complete report. Pt verbalized understanding.  Pt scheduled for OV 5/30/2019

## 2019-05-24 NOTE — TELEPHONE ENCOUNTER
L/m on both cell and home numbers for pt to call to schedule ov per Dr. Kaleigh Joseph request, asap.

## 2019-05-29 ENCOUNTER — HOSPITAL ENCOUNTER (OUTPATIENT)
Dept: SLEEP CENTER | Age: 59
Discharge: HOME | End: 2019-05-29
Payer: COMMERCIAL

## 2019-05-29 VITALS
OXYGEN SATURATION: 95 % | WEIGHT: 200.1 LBS | BODY MASS INDEX: 31.4 KG/M2 | HEIGHT: 67 IN | SYSTOLIC BLOOD PRESSURE: 115 MMHG | DIASTOLIC BLOOD PRESSURE: 67 MMHG | HEART RATE: 115 BPM

## 2019-05-29 DIAGNOSIS — G47.33 OSA (OBSTRUCTIVE SLEEP APNEA): Primary | ICD-10-CM

## 2019-05-29 PROCEDURE — 99211 OFF/OP EST MAY X REQ PHY/QHP: CPT | Performed by: PSYCHIATRY & NEUROLOGY

## 2019-05-29 ASSESSMENT — SLEEP AND FATIGUE QUESTIONNAIRES
HOW LIKELY ARE YOU TO NOD OFF OR FALL ASLEEP WHILE SITTING AND READING: 1
ESS TOTAL SCORE: 2
HOW LIKELY ARE YOU TO NOD OFF OR FALL ASLEEP WHILE SITTING AND TALKING TO SOMEONE: 0
HOW LIKELY ARE YOU TO NOD OFF OR FALL ASLEEP WHILE WATCHING TV: 0
HOW LIKELY ARE YOU TO NOD OFF OR FALL ASLEEP IN A CAR, WHILE STOPPED FOR A FEW MINUTES IN TRAFFIC: 0
HOW LIKELY ARE YOU TO NOD OFF OR FALL ASLEEP WHILE SITTING INACTIVE IN A PUBLIC PLACE: 0
HOW LIKELY ARE YOU TO NOD OFF OR FALL ASLEEP WHEN YOU ARE A PASSENGER IN A CAR FOR AN HOUR WITHOUT A BREAK: 1
HOW LIKELY ARE YOU TO NOD OFF OR FALL ASLEEP WHILE SITTING QUIETLY AFTER LUNCH WITHOUT ALCOHOL: 0
HOW LIKELY ARE YOU TO NOD OFF OR FALL ASLEEP WHILE LYING DOWN TO REST IN THE AFTERNOON WHEN CIRCUMSTANCES PERMIT: 0

## 2019-05-29 NOTE — PROGRESS NOTES
Willow Nava MD, Eddie Mejía MD, Christie Hassan MD, Tahmina Zazueta MD, [facility]      [address], [address] [facility]   PH: [phone]  F: [phone]     Subjective:     Patient ID: Markell Nolen is a 62 y.o. female, referred to the sleep center for   Chief Complaint   Patient presents with    Atrial Fibrillation   . Referring physician:  Dr Akiko Austin     History:    Snoring & eds for sometime. No apneas were seen by her .   Was found to have A Fif  Was seen to have apnea during sleep while being w/u for A fIb  Some times may dos=ze off watching TV      Social History     Socioeconomic History    Marital status:      Spouse name: Not on file    Number of children: Not on file    Years of education: Not on file    Highest education level: Not on file   Occupational History    Not on file   Social Needs    Financial resource strain: Not on file    Food insecurity:     Worry: Not on file     Inability: Not on file    Transportation needs:     Medical: Not on file     Non-medical: Not on file   Tobacco Use    Smoking status: Never Smoker    Smokeless tobacco: Never Used   Substance and Sexual Activity    Alcohol use: Never     Frequency: Never    Drug use: Never    Sexual activity: Yes     Partners: Male   Lifestyle    Physical activity:     Days per week: Not on file     Minutes per session: Not on file    Stress: Not on file   Relationships    Social connections:     Talks on phone: Not on file     Gets together: Not on file     Attends L[employer]-day service: Not on file     Active member of club or organization: Not on file     Attends meetings of clubs or organizations: Not on file     Relationship status: Not on file    Intimate partner violence:     Fear of current or ex partner: Not on file     Emotionally abused: Not on file     Physically abused: Not on file Forced sexual activity: Not on file   Other Topics Concern    Not on file   Social History Narrative    Not on file       Prior to Admission medications    Medication Sig Start Date End Date Taking? Authorizing Provider   diltiazem (CARDIZEM CD) 240 MG extended release capsule Take 1 capsule by mouth daily 5/3/19  Yes Tyson Titus MD   aspirin 325 MG EC tablet Take 1 tablet by mouth daily 19  Yes Jase Cohen MD       Allergies as of 2019 - Review Complete 2019   Allergen Reaction Noted    Codeine  2019       Patient Active Problem List   Diagnosis    Atrial fibrillation with RVR ([address]       Past Medical History:   Diagnosis Date    Atrial fibrillation ([address]     Hyperlipidemia        Past Surgical History:   Procedure Laterality Date    APPENDECTOMY       SECTION      CHOLECYSTECTOMY      HYSTERECTOMY         Family History   Problem Relation Age of Onset    High Cholesterol Mother     Heart Surgery Mother     Hypertension Father          Objective:     Vitals:    19 1008   BP: 115/67   Pulse: 115   SpO2: 95%   Weight: 200 lb 1.6 oz (90.8 kg)   Height: 5' 7\" (1.702 m)     Neck circumference: 16  Inches  Mcallen - Total score: 2    Gen: No distress. Eyes: PERRL. No sclera icterus. No conjunctival injection. ENT: No discharge. Pharynx clear. External appearance of ears and nose normal.  Neck: Trachea midline. No obvious mass. Resp: No accessory muscle use. No crackles. No wheezes. No rhonchi. No dullness on percussion. CV: Regular rate. Regular rhythm. No murmur or rub. No edema. GI: Non-tender. Non-distended. No hernia. Skin: Warm, dry, normal texture and turgor. No nodule on exposed extremities. Lymph: No cervical LAD. No supraclavicular LAD. M/S: No cyanosis. No clubbing. No joint deformity. Psych: Oriented x 3. No anxiety. Awake. Alert. Intact judgement and insight.     Mallampati Airway Classification:   []1 []2 []3 [x]4        Sleep Complaints/Symptoms:    Normal Bedtime:      Normal Wake Time:   Average Sleep Time:  6-7 hra   Number of Awakenings: 1-2    Duration of Sleep Complaints: few months    [x] Snoring     [] Improved [] Not Improved    [] Choking/Gasping for Breath  [] Improved [] Not Improved       [x] Witnessed Apneas              [] Improved [] Not Improved  [x] Daytime Sleepiness some            [] Improved [] Not Improved  [] Morning Headaches    [] Improved [] Not Improved  [] Frequent Awakenings       [] Improved [] Not Improved  [] Jerky Movements   [] Improved [] Not Improved   [] Restless Legs   [] Improved [] Not Improved   [] Difficulty Initiating Sleep  [] Improved [] Not Improved   [] Difficulty Maintaining Sleep  [] Improved [] Not Improved   [] Restless Sleep    [] Improved [] Not Improved   [] Sleep Paralysis    [] Improved [] Not Improved   [] Muscle Weakness w/ Emotion  [] Improved [] Not Improved  [] Other :     CPAP Usage:    [x]  Patient has never worn CPAP  []  Patient has worn CPAP previously but discontinued use  []  Current PAP user,  [years]   []  Patient Tolerates Well   []  Patient Does Not Tolerate     []  Patient Uses CPAP      []  More Than 4 Hours      []  Less Than 4 Hours  []  CPAP/BPAP/ASV Pressure Readings   []  CPAP Pressure      cm H20   []  BPAP Pressure       cm H20   []  ASV Pressure         cm H20      Assessment:      Diagnosis:     ANISA  hyoersomnia  A Fib      Patient Active Problem List    Diagnosis Date Noted    Atrial fibrillation with RVR ([facility] [address] 04/17/2019     Plan:        Sleep Study:     [x]  Sleep hygiene/ relaxation methods & CBTi principles review with patient     []  HST - Home Sleep Study   [x]  PSG - Overnight Diagnostic Polysomnogram     []  CPAP Titration    [] Split Night Study    [] BiLevel Titration    [] ASV - Auto-Servo Ventilation Titration       []  MSLT - Multiple Sleep Latency Test   []  MWT - Maintenance of Wakefulness Test    CPAP Therapy:     []  Patient to be seen for new mask fitting/desensitization   []  AutoPAP Titration    []  CPAP supplies and equipment at ________cmH2O    []  Continue same CPAP pressure   []  Change CPAP pressure to _______cm H2O   []  CPAP supplies only, no pressure change   []  Refer for an oral appliance       Medications:       []  Continue current medication    []  Add Medication:  ________________    Follow-Up:     []  No follow up required. Patient to return as needed. []  2 weeks   []  4 weeks   []  2 months   []  4 months   []  6 months   []  1 year for CPAP compliance evaluation. Patient to return sooner, as needed. [x]  Follow up after sleep study   []  Other: ______________    No orders of the defined types were placed in this encounter.          Electronically signed by Sherine Mccullough MD on 5/29/2019 at 10:45 AM

## 2019-05-30 ENCOUNTER — OFFICE VISIT (OUTPATIENT)
Dept: CARDIOLOGY CLINIC | Age: 59
End: 2019-05-30
Payer: COMMERCIAL

## 2019-05-30 VITALS
DIASTOLIC BLOOD PRESSURE: 82 MMHG | HEIGHT: 67 IN | HEART RATE: 96 BPM | SYSTOLIC BLOOD PRESSURE: 132 MMHG | BODY MASS INDEX: 31.71 KG/M2 | WEIGHT: 202 LBS

## 2019-05-30 DIAGNOSIS — I48.0 PAF (PAROXYSMAL ATRIAL FIBRILLATION) (HCC): Primary | ICD-10-CM

## 2019-05-30 PROCEDURE — 99214 OFFICE O/P EST MOD 30 MIN: CPT | Performed by: INTERNAL MEDICINE

## 2019-05-30 NOTE — PROGRESS NOTES
Andres Day MD        OFFICE  FOLLOWUP NOTE    Chief complaints: patient is here for management of  aflutter/afib, DYSLIPDIEMIA, cardiomyopathy      Subjective: patient feels better, no chest pain, no shortness of breath, no dizziness, no palpitations    HPI Mirza Brambila is a 62 y. o.year old who  has a past medical history of Atrial fibrillation ([facility] [address] and Hyperlipidemia. and presents for management of aflutter/afib, DYSLIPDIEMIA, cardiomyopathy which are well controlled,he echo showed LVEF 30-35%. Current Outpatient Medications   Medication Sig Dispense Refill    diltiazem (CARDIZEM CD) 240 MG extended release capsule Take 1 capsule by mouth daily 180 capsule 3    aspirin 325 MG EC tablet Take 1 tablet by mouth daily 30 tablet 3     No current facility-administered medications for this visit.       Allergies: Codeine  Past Medical History:   Diagnosis Date    Atrial fibrillation ([facility] [address]     Hyperlipidemia      Past Surgical History:   Procedure Laterality Date    APPENDECTOMY       SECTION      CHOLECYSTECTOMY      HYSTERECTOMY       Family History   Problem Relation Age of Onset    High Cholesterol Mother     Heart Surgery Mother     Hypertension Father      Social History     Tobacco Use    Smoking status: Never Smoker    Smokeless tobacco: Never Used   Substance Use Topics    Alcohol use: Never     Frequency: Never      @[facility]@  Review of Systems:   · Constitutional: No Fever or Weight Loss   · Eyes: No Decreased Vision  · ENT: No Headaches, Hearing Loss or Vertigo  · Cardiovascular: No chest pain, dyspnea on exertion, palpitations or loss of consciousness  · Respiratory: No cough or wheezing    · Gastrointestinal: No abdominal pain, appetite loss, blood in stools, constipation, diarrhea or heartburn  · Genitourinary: No dysuria, trouble voiding, or hematuria  · Musculoskeletal:  No gait disturbance, weakness or joint complaints  · Integumentary: No rash or pruritis  · Neurological: No TIA or stroke symptoms  · Psychiatric: No anxiety or depression  · Endocrine: No malaise, fatigue or temperature intolerance  · Hematologic/Lymphatic: No bleeding problems, blood clots or swollen lymph nodes  · Allergic/Immunologic: No nasal congestion or hives  All systems negative except as marked. Objective:  /82   Pulse 96   Ht 5' 7\" (1.702 m)   Wt 202 lb (91.6 kg)   BMI 31.64 kg/m²   Wt Readings from Last 3 Encounters:   05/30/19 202 lb (91.6 kg)   05/29/19 200 lb 1.6 oz (90.8 kg)   05/13/19 202 lb (91.6 kg)     Body mass index is 31.64 kg/m². GENERAL - Alert, oriented, pleasant, in no apparent distress,normal grooming  HEENT - pupils are reactive to light and accomodation, cornea intact, conjunctive normal color, ears are normal in exam,throat exam in normal, teeth, gum and palate are normal, oral mucosa is normal without any notation of pallor or cyanosis  Neck - Supple. No jugular venous distention noted. No carotid bruits, no apical -carotid delay  Respiratory:  Normal breath sounds, No respiratory distress, No wheezing, No chest tenderness. ,no use of accessory muscles, diaphragm movement is normal  Cardiovascular: (PMI) apex non displaced,no lifts no thrills, no s3,no s4, Normal heart rate, Normal rhythm, No murmurs, No rubs, No gallops.  Carotid arteries pulse and amplitude are normal no bruit, no abdominal bruit noted ( normal abdominal aorta ausculation), femoral arteries pulse and amplitude are normal no bruit, pedal pulses are normal  Femoral pulses have normal amplitude, no bruits   Extremities - No cyanosis, clubbing, or significant edema, no varicose veins    Abdomen - No masses, tenderness, or organomegaly, no hepato-splenomegally, no bruits  Musculoskeletal - No significant edema, no kyphosis or scoliosis, no deformity in any extremity noted, muscle strength and tone are normal  Skin: no ulcer,no scar,no stasis dermatitis   Neurologic - alert oriented times 3,Cranial nerves II through XII are grossly intact. There were no gross focal neurologic abnormalities. All sensory and motor nerves examined and were normal  Psychiatric: normal mood and affect    Lab Results   Component Value Date    CKTOTAL 150 04/17/2019     BNP:  No results found for: BNP  PT/INR:  No results found for: PTINR  No results found for: LABA1C  Lab Results   Component Value Date    CHOL 216 (H) 05/07/2019    TRIG 157 (H) 05/07/2019    HDL 63 05/07/2019    LDLCALC 127 (H) 12/18/2012    LDLDIRECT 150 (H) 05/07/2019     Lab Results   Component Value Date    ALT 20 05/07/2019    AST 18 05/07/2019     TSH:  No results found for: TSH    Impression:  Shannan Morillo is a 62 y. o.year old who  has a past medical history of Atrial fibrillation ([address] and Hyperlipidemia. and presents with     Plan:  1. Cardiomyopathy: not sure if its from AFIB RVR, will get [facility] and recommend to cardiovert if coronaries are normal, will also need to add lopressor and lisinopril after [facility] and may stop cardiazem  2. Aflutter/afib:  option for cardioversion given to patient, will followup ,   3. Dyslipidemia: stable. 4. Health maintenance: exerise and diet      All labs, medications and tests reviewed, continue all other medications of all above medical condition listed as is.     [unfilled]

## 2019-05-30 NOTE — PROGRESS NOTES
[id number] Score for Atrial Fibrillation Stroke Risk   Risk   Factors  Component Value   C CHF No 0   H HTN No 0   A2 Age >= 76 No,  (60 y.o.) 0   D DM No 0   S2 Prior Stroke/TIA No 0   V Vascular Disease No 0   A Age 74-69 No,  (60 y.o.) 0   Sc Sex female 1    [id number]  Score  1   Score last updated 3/68/95 7:25 PM    Click here for a link to the UpToDate guideline \"Atrial Fibrillation: Anticoagulation therapy to prevent embolization    Disclaimer: Risk Score calculation is dependent on accuracy of patient problem list and past encounter diagnosis.

## 2019-05-30 NOTE — LETTER
[facility]      [address] WITH POSSIBLE PERCUTANEOUS CORONARY INTERVENTION     Patient Name: Markell Nolen   : 1960   MRN# [id number]    Date of Procedure: 19 Time: 8 AM Arrival Time: 6 AM    The catheterization and angiogram are usually outpatient procedures, however if stenting is needed you will stay overnight. You will need to be at the hospital two hours before the procedure. You will need to arrange for someone to drive you home. You will go to registration in the main lobby. HOSPITAL:  [facility])  Call to Pre-[place] at: [phone] 1-2 days before your procedure. Please have blood work and chest-x-ray done 1 to 2 days before procedure at    [facility]. X Please do not have anything by mouth after midnight prior to or 8 hours before the procedure. X You may take your medications with a sip of water in the morning before your procedure or  take them with you. Patient Signature:  _________________________ Staff Signature: Natalie De Dios       Staff Given Instructions:_______________________________                                      [place] ([facility]    Dr. Bryan Valdez     Patient Name: Markell Nolen   : 1960   MRN# [id number]    Date of Procedure: 19 Time: 8 AM     LEFT HEART CATHETERIZATION WITH POSSIBLE PERCUTANEOUS CORONARY INTERVENTION       X Chest x-Ray PA & Lateral View      X Type & Screen     X CBC  X BMP  X PT  X PTT            ? PLEASE CALL ABNORMAL RESULTS TO THE  PHYSICIAN? ATTENTION PATIENT: Pretesting is to be done before the cath. You do not have to fast for the lab work. You must go to the [facility] at [address] [facility] to have this lab work done.   Phone: [phone] Hours: 7:00 am to 5:00 pm       PHYSICIAN SIGNATURE:     DATE: [facility]) Informed Consent for Anesthesia/Sedation, Surgery, Invasive Procedures, and other High-risk Interventions and Medication use      *This consent is applicable for 30 days following patient signature*    Procedure(s)   ISharona authorize, Dr. Melany Ruffin    and the associate(s) or assistant(s) of his/her choice, to perform the following procedure(s): [address]       I know that unexpected conditions may require additional or different procedures than those above. I authorize the above named practitioner(s) perform these as necessary and desirable. This is based on the practitioners professional judgment. The above named practitioner has discussed the above procedure(s) with me, including:  ? Potential benefits, including likelihood of success of the procedure(s) goals  ? Risks  ? Side effects, risk of death, and risk of infection  ? Any potential problems that might occur during recuperation or healing post-procedure  ? Reasonable alternatives  ? Risks of NOT performing the procedure(s)    I acknowledge that no warranty or guarantee has been made to the results the procedure(s). I consent to the above named practitioner(s) providing additional services to me as deemed reasonable and necessary, including but not limited to:    ? Use of medications for anesthesia or sedation. ? All anesthesia and sedation carry risks. My practitioner has discussed my anticipated anesthesia and/or sedation and the risks of using, risk of not using, benefits, side effects, and alternatives. ? Use of pathology  ? I authorize [facility]) to dispose of tissues, specimens or organs when pathology is complete. ? Use of radiology  ? A contrast agent may be required for radiology procedures.   My practitioner has advised me of the risks of using, risks of not using, benefits, side effects, and alternatives. ? Observers or use of photography, video/audio recording, or televising of the procedure(s). This is for medical, scientific, or educational purposes. This includes appropriate portions of my body. My identity will not be revealed. ? I consent to release of my social security number and other identifying information to ros 145 (FDA), and the supplier/, if I receive tissue, a device, or implant. This is to track the tissue, device, or implant for defect, recall, infection, etc.     ? Use of blood and/or blood products, if needed, through my hospital stay. My practitioner has advised me of the risks of using, risks of not using, benefits, side effects, and alternatives. ___ I do NOT want Blood or Blood products given. (Complete separate  refusal form)    Code Status (yoly one):  ___ I do NOT HAVE a DNR order. I am a Full-code.   I will receive CPR, intubation,  chest compressions, medications, and/or other life saving measures if I have a  cardiac or respiratory arrest.    ___ I have a Do Not Resuscitate (DNR)order.   (yoly one below)  ___  I rescind my DNR for surgery and immediate post-operative period through Phase 2 recovery. This means, for that time period, I will be a Full-code and receive CPR, intubation, chest compressions, medications, and/or other life saving measures, if I have a cardiac or respiratory arrest.    ___ I WANT to keep my DNR in effect during my procedure(s) and immediate post-operative recovery period through Phase 2 recovery. (Complete separate refusal form)     This form has been fully explained to me. I understand its contents.       Patients Signature: ___________________________Date: ________  Time: ________      [facility]) Witness________________________  Date: ________   Time: _________ Physician/Practitioner _______________________  Date: ________   Time: _________           Revision 2017                  [place] ([facility]    Dr. Shila Vergara    PROCEDURE TO SCHEDULE:    LEFT HEART CATHETERIZATION WITH POSSIBLE PERCUTANEOUS CORONARY INTERVENTION     Patient Name: Kimberly Pedroza   : 1960   MRN# [id number]    Home Phone Number: [phone]   Weight:    Wt Readings from Last 3 Encounters:   19 202 lb (91.6 kg)   19 200 lb 1.6 oz (90.8 kg)   19 202 lb (91.6 kg)        Insurance: Payor: UMR / Plan: UMR  / Product Type: *No Product type* /     Date of Procedure: 19 Time: 8 Arrival Time: 6    Diagnosis:  CMP  Allergies:    Allergies   Allergen Reactions    Codeine      Nausea and vomiting         1) Call [facility] scheduling ([phone]) or Instant Message  CONFIRMED WITH    PHONE OR   INSTANT MESSAGE  2) PREAUTHORIZATION NUMBER:   Spoke to:     From date:    expiration date:       [facility]

## 2019-05-30 NOTE — PROGRESS NOTES
Pt here in office & educated on [address] [facility] for Dx: CMP, scheduled for 7/2/19 @ 8, w/arrival @ 6, @ [facility]; risks explained; & consents signed. Pre-admission orders given to pt for labs & CXR, which are due 7/1/19 @ [facility]. Instructions given to pt to:  NPO after midnight night before procedure; Call hospital @ [phone] to pre-register; May take rest of morning meds. am of procedure; & pt voiced understanding. Copies of consent, pre-testing orders, & info. sheet given to Misty.

## 2019-06-14 ENCOUNTER — TELEPHONE (OUTPATIENT)
Dept: CARDIOLOGY CLINIC | Age: 59
End: 2019-06-14

## 2019-06-14 NOTE — TELEPHONE ENCOUNTER
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 Rescheduled C for 8/8 @ 8 AM, arrival @ 6 AM.   Patient will come in the office 7/31 to sign new consents.

## 2019-06-21 ENCOUNTER — HOSPITAL ENCOUNTER (OUTPATIENT)
Dept: SLEEP CENTER | Age: 59
Discharge: HOME | End: 2019-06-21
Payer: COMMERCIAL

## 2019-06-21 PROCEDURE — 95811 POLYSOM 6/>YRS CPAP 4/> PARM: CPT

## 2019-06-22 NOTE — PROGRESS NOTES
6/22/2019  sleep study  for Francisco Fernandez  1960 is complete. Results are pending physician review.     Electronically signed by Lorene Desai on 6/22/2019 at 7:10 AM

## 2019-06-24 ENCOUNTER — TELEPHONE (OUTPATIENT)
Dept: CARDIOLOGY CLINIC | Age: 59
End: 2019-06-24

## 2019-07-31 ENCOUNTER — TELEPHONE (OUTPATIENT)
Dept: CARDIOLOGY CLINIC | Age: 59
End: 2019-07-31

## 2019-08-13 ENCOUNTER — HOSPITAL ENCOUNTER (OUTPATIENT)
Age: 59
Discharge: HOME | End: 2019-08-13
Payer: COMMERCIAL

## 2019-08-13 ENCOUNTER — HOSPITAL ENCOUNTER (OUTPATIENT)
Dept: GENERAL RADIOLOGY | Age: 59
Discharge: HOME | End: 2019-08-13
Payer: COMMERCIAL

## 2019-08-13 DIAGNOSIS — Z01.818 PREOP EXAMINATION: ICD-10-CM

## 2019-08-13 LAB
ABO/RH: NORMAL
ANION GAP SERPL CALCULATED.3IONS-SCNC: 11 MMOL/L (ref 4–16)
ANTIBODY SCREEN: NEGATIVE
APTT: 28.4 SECONDS (ref 21.2–33)
BASOPHILS ABSOLUTE: 0.1 K/CU MM
BASOPHILS RELATIVE PERCENT: 0.7 % (ref 0–1)
BUN BLDV-MCNC: 21 MG/DL (ref 6–23)
CALCIUM SERPL-MCNC: 9.9 MG/DL (ref 8.3–10.6)
CHLORIDE BLD-SCNC: 101 MMOL/L (ref 99–110)
CO2: 29 MMOL/L (ref 21–32)
COMMENT: NORMAL
CREAT SERPL-MCNC: 0.9 MG/DL (ref 0.6–1.1)
DIFFERENTIAL TYPE: ABNORMAL
EOSINOPHILS ABSOLUTE: 0.2 K/CU MM
EOSINOPHILS RELATIVE PERCENT: 2.6 % (ref 0–3)
GFR AFRICAN AMERICAN: >60 ML/MIN/1.73M2
GFR NON-AFRICAN AMERICAN: >60 ML/MIN/1.73M2
GLUCOSE BLD-MCNC: 115 MG/DL (ref 70–99)
HCT VFR BLD CALC: 46.3 % (ref 37–47)
HEMOGLOBIN: 14.6 GM/DL (ref 12.5–16)
IMMATURE NEUTROPHIL %: 0.4 % (ref 0–0.43)
INR BLD: 0.94 INDEX
LYMPHOCYTES ABSOLUTE: 2.4 K/CU MM
LYMPHOCYTES RELATIVE PERCENT: 34.9 % (ref 24–44)
MCH RBC QN AUTO: 29.7 PG (ref 27–31)
MCHC RBC AUTO-ENTMCNC: 31.5 % (ref 32–36)
MCV RBC AUTO: 94.3 FL (ref 78–100)
MONOCYTES ABSOLUTE: 0.6 K/CU MM
MONOCYTES RELATIVE PERCENT: 8.6 % (ref 0–4)
NUCLEATED RBC %: 0 %
PDW BLD-RTO: 12 % (ref 11.7–14.9)
PLATELET # BLD: 289 K/CU MM (ref 140–440)
PMV BLD AUTO: 10.2 FL (ref 7.5–11.1)
POTASSIUM SERPL-SCNC: 4.2 MMOL/L (ref 3.5–5.1)
PROTHROMBIN TIME: 10.7 SECONDS (ref 9.12–12.5)
RBC # BLD: 4.91 M/CU MM (ref 4.2–5.4)
SEGMENTED NEUTROPHILS ABSOLUTE COUNT: 3.7 K/CU MM
SEGMENTED NEUTROPHILS RELATIVE PERCENT: 52.8 % (ref 36–66)
SODIUM BLD-SCNC: 141 MMOL/L (ref 135–145)
TOTAL IMMATURE NEUTOROPHIL: 0.03 K/CU MM
TOTAL NUCLEATED RBC: 0 K/CU MM
WBC # BLD: 7 K/CU MM (ref 4–10.5)

## 2019-08-13 PROCEDURE — 85730 THROMBOPLASTIN TIME PARTIAL: CPT

## 2019-08-13 PROCEDURE — 86901 BLOOD TYPING SEROLOGIC RH(D): CPT

## 2019-08-13 PROCEDURE — 71046 X-RAY EXAM CHEST 2 VIEWS: CPT

## 2019-08-13 PROCEDURE — 86850 RBC ANTIBODY SCREEN: CPT

## 2019-08-13 PROCEDURE — 92960 CARDIOVERSION ELECTRIC EXT: CPT

## 2019-08-13 PROCEDURE — 80048 BASIC METABOLIC PNL TOTAL CA: CPT

## 2019-08-13 PROCEDURE — 7100000000 HC PACU RECOVERY - FIRST 15 MIN

## 2019-08-13 PROCEDURE — 7100000001 HC PACU RECOVERY - ADDTL 15 MIN

## 2019-08-13 PROCEDURE — 85610 PROTHROMBIN TIME: CPT

## 2019-08-13 PROCEDURE — 36415 COLL VENOUS BLD VENIPUNCTURE: CPT

## 2019-08-13 PROCEDURE — 85025 COMPLETE CBC W/AUTO DIFF WBC: CPT

## 2019-08-13 PROCEDURE — 86900 BLOOD TYPING SEROLOGIC ABO: CPT

## 2019-08-14 ENCOUNTER — TELEPHONE (OUTPATIENT)
Dept: CARDIOLOGY CLINIC | Age: 59
End: 2019-08-14

## 2019-08-14 NOTE — TELEPHONE ENCOUNTER
Left VM for reminder of instructions for [facility] tomorrow.

## 2019-08-15 ENCOUNTER — HOSPITAL ENCOUNTER (OUTPATIENT)
Dept: CARDIAC CATH/INVASIVE PROCEDURES | Age: 59
Discharge: HOME | End: 2019-08-15
Attending: INTERNAL MEDICINE | Admitting: INTERNAL MEDICINE
Payer: COMMERCIAL

## 2019-08-15 VITALS
TEMPERATURE: 98.7 F | WEIGHT: 200 LBS | SYSTOLIC BLOOD PRESSURE: 128 MMHG | HEART RATE: 68 BPM | DIASTOLIC BLOOD PRESSURE: 73 MMHG | OXYGEN SATURATION: 99 % | BODY MASS INDEX: 31.39 KG/M2 | RESPIRATION RATE: 18 BRPM | HEIGHT: 67 IN

## 2019-08-15 PROCEDURE — C1769 GUIDE WIRE: HCPCS

## 2019-08-15 PROCEDURE — 6370000000 HC RX 637 (ALT 250 FOR IP): Performed by: INTERNAL MEDICINE

## 2019-08-15 PROCEDURE — 2709999900 HC NON-CHARGEABLE SUPPLY

## 2019-08-15 PROCEDURE — 92960 CARDIOVERSION ELECTRIC EXT: CPT | Performed by: INTERNAL MEDICINE

## 2019-08-15 PROCEDURE — 93312 ECHO TRANSESOPHAGEAL: CPT | Performed by: INTERNAL MEDICINE

## 2019-08-15 PROCEDURE — 2500000003 HC RX 250 WO HCPCS

## 2019-08-15 PROCEDURE — 93312 ECHO TRANSESOPHAGEAL: CPT

## 2019-08-15 PROCEDURE — 6360000002 HC RX W HCPCS: Performed by: INTERNAL MEDICINE

## 2019-08-15 PROCEDURE — 93005 ELECTROCARDIOGRAM TRACING: CPT | Performed by: INTERNAL MEDICINE

## 2019-08-15 PROCEDURE — 6360000004 HC RX CONTRAST MEDICATION

## 2019-08-15 PROCEDURE — C1894 INTRO/SHEATH, NON-LASER: HCPCS

## 2019-08-15 PROCEDURE — 2580000003 HC RX 258: Performed by: INTERNAL MEDICINE

## 2019-08-15 PROCEDURE — 93458 L HRT ARTERY/VENTRICLE ANGIO: CPT

## 2019-08-15 PROCEDURE — 93458 L HRT ARTERY/VENTRICLE ANGIO: CPT | Performed by: INTERNAL MEDICINE

## 2019-08-15 PROCEDURE — 6360000002 HC RX W HCPCS

## 2019-08-15 RX ORDER — DIPHENHYDRAMINE HCL 25 MG
25 TABLET ORAL ONCE
Status: COMPLETED | OUTPATIENT
Start: 2019-08-15 | End: 2019-08-15

## 2019-08-15 RX ORDER — DIAZEPAM 5 MG/1
5 TABLET ORAL ONCE
Status: COMPLETED | OUTPATIENT
Start: 2019-08-15 | End: 2019-08-15

## 2019-08-15 RX ORDER — SODIUM CHLORIDE 9 MG/ML
INJECTION, SOLUTION INTRAVENOUS CONTINUOUS
Status: DISCONTINUED | OUTPATIENT
Start: 2019-08-15 | End: 2019-08-15 | Stop reason: HOSPADM

## 2019-08-15 RX ADMIN — SODIUM CHLORIDE: 900 INJECTION INTRAVENOUS at 06:24

## 2019-08-15 RX ADMIN — ENOXAPARIN SODIUM 90 MG: 100 INJECTION SUBCUTANEOUS at 13:18

## 2019-08-15 RX ADMIN — DIAZEPAM 5 MG: 5 TABLET ORAL at 06:24

## 2019-08-15 RX ADMIN — DIPHENHYDRAMINE HCL 25 MG: 25 TABLET ORAL at 06:23

## 2019-08-15 NOTE — FLOWSHEET NOTE
Report received from [facility], RN at bedside folllowing ALAN/Cardioversion. Patient resting quietly, VSS. Will continue to monitor.

## 2019-08-16 PROCEDURE — 93010 ELECTROCARDIOGRAM REPORT: CPT | Performed by: INTERNAL MEDICINE

## 2019-08-18 LAB
EKG ATRIAL RATE: 67 BPM
EKG DIAGNOSIS: NORMAL
EKG P AXIS: 40 DEGREES
EKG P-R INTERVAL: 162 MS
EKG Q-T INTERVAL: 412 MS
EKG QRS DURATION: 76 MS
EKG QTC CALCULATION (BAZETT): 435 MS
EKG R AXIS: 39 DEGREES
EKG T AXIS: 28 DEGREES
EKG VENTRICULAR RATE: 67 BPM

## 2019-08-20 ENCOUNTER — TELEPHONE (OUTPATIENT)
Dept: CARDIOLOGY CLINIC | Age: 59
End: 2019-08-20

## 2019-08-20 NOTE — TELEPHONE ENCOUNTER
Left VM that I was checking on her after her procedures. I also left the # for her to call and scheduled a F/U with Dr. Tammi Islas.

## 2019-08-26 ENCOUNTER — OFFICE VISIT (OUTPATIENT)
Dept: CARDIOLOGY CLINIC | Age: 59
End: 2019-08-26
Payer: COMMERCIAL

## 2019-08-26 VITALS
WEIGHT: 205.4 LBS | HEART RATE: 120 BPM | BODY MASS INDEX: 32.24 KG/M2 | DIASTOLIC BLOOD PRESSURE: 86 MMHG | SYSTOLIC BLOOD PRESSURE: 120 MMHG | HEIGHT: 67 IN

## 2019-08-26 DIAGNOSIS — I48.91 ATRIAL FIBRILLATION, UNSPECIFIED TYPE (HCC): ICD-10-CM

## 2019-08-26 PROCEDURE — 99214 OFFICE O/P EST MOD 30 MIN: CPT | Performed by: INTERNAL MEDICINE

## 2019-08-26 PROCEDURE — 93000 ELECTROCARDIOGRAM COMPLETE: CPT | Performed by: INTERNAL MEDICINE

## 2019-08-26 RX ORDER — AMIODARONE HYDROCHLORIDE 200 MG/1
200 TABLET ORAL DAILY
Qty: 30 TABLET | Refills: 3 | Status: ON HOLD | OUTPATIENT
Start: 2019-08-26 | End: 2019-09-08 | Stop reason: SDUPTHER

## 2019-08-26 RX ORDER — DILTIAZEM HYDROCHLORIDE 240 MG/1
240 CAPSULE, COATED, EXTENDED RELEASE ORAL DAILY
Qty: 180 CAPSULE | Refills: 3 | Status: CANCELLED | OUTPATIENT
Start: 2019-08-26

## 2019-08-26 RX ORDER — AMIODARONE HYDROCHLORIDE 200 MG/1
200 TABLET ORAL DAILY
COMMUNITY
End: 2019-09-06 | Stop reason: SDUPTHER

## 2019-08-26 RX ORDER — AMIODARONE HYDROCHLORIDE 200 MG/1
200 TABLET ORAL DAILY
Qty: 90 TABLET | Refills: 3 | Status: SHIPPED | OUTPATIENT
Start: 2019-08-26 | End: 2019-09-06 | Stop reason: SDUPTHER

## 2019-08-26 NOTE — LETTER
This is for medical, scientific, or educational purposes. This includes appropriate portions of my body. My identity will not be revealed. ? I consent to release of my social security number and other identifying information to [address] (FDA), and the supplier/, if I receive tissue, a device, or implant. This is to track the tissue, device, or implant for defect, recall, infection, etc.     ? Use of blood and/or blood products, if needed, through my hospital stay. My practitioner has advised me of the risks of using, risks of not using, benefits, side effects, and alternatives. ___ I do NOT want Blood or Blood products given. (Complete separate  refusal form)    Code Status (yoly one):  ___ I do NOT HAVE a DNR order. I am a Full-code.   I will receive CPR, intubation,  chest compressions, medications, and/or other life saving measures if I have a  cardiac or respiratory arrest.    ___ I have a Do Not Resuscitate (DNR)order.   (yoly one below)  ___  I rescind my DNR for surgery and immediate post-operative period through Phase 2 recovery. This means, for that time period, I will be a Full-code and receive CPR, intubation, chest compressions, medications, and/or other life saving measures, if I have a cardiac or respiratory arrest.    ___ I WANT to keep my DNR in effect during my procedure(s) and immediate post-operative recovery period through Phase 2 recovery. (Complete separate refusal form)     This form has been fully explained to me. I understand its contents.       Patients Signature: ___________________________Date: ________  Time: ________          [facility]) Witness________________________  Date: ________   Time: _________    Physician/Practitioner _______________________  Date: ________   Time: _________           Revision 8-                        Shereen Pantoja

## 2019-08-27 ENCOUNTER — HOSPITAL ENCOUNTER (OUTPATIENT)
Age: 59
Discharge: HOME | End: 2019-08-27
Payer: COMMERCIAL

## 2019-08-27 ENCOUNTER — HOSPITAL ENCOUNTER (OUTPATIENT)
Dept: NON INVASIVE DIAGNOSTICS | Age: 59
Discharge: HOME | End: 2019-08-27
Payer: COMMERCIAL

## 2019-08-27 VITALS
DIASTOLIC BLOOD PRESSURE: 78 MMHG | SYSTOLIC BLOOD PRESSURE: 115 MMHG | OXYGEN SATURATION: 97 % | RESPIRATION RATE: 15 BRPM | HEART RATE: 66 BPM

## 2019-08-27 LAB
ANION GAP SERPL CALCULATED.3IONS-SCNC: 10 MMOL/L (ref 4–16)
BUN BLDV-MCNC: 17 MG/DL (ref 6–23)
CALCIUM SERPL-MCNC: 10 MG/DL (ref 8.3–10.6)
CHLORIDE BLD-SCNC: 100 MMOL/L (ref 99–110)
CO2: 29 MMOL/L (ref 21–32)
CREAT SERPL-MCNC: 0.9 MG/DL (ref 0.6–1.1)
EKG ATRIAL RATE: 326 BPM
EKG ATRIAL RATE: 72 BPM
EKG DIAGNOSIS: NORMAL
EKG DIAGNOSIS: NORMAL
EKG P AXIS: 57 DEGREES
EKG P-R INTERVAL: 158 MS
EKG Q-T INTERVAL: 348 MS
EKG Q-T INTERVAL: 390 MS
EKG QRS DURATION: 84 MS
EKG QRS DURATION: 88 MS
EKG QTC CALCULATION (BAZETT): 427 MS
EKG QTC CALCULATION (BAZETT): 441 MS
EKG R AXIS: 65 DEGREES
EKG R AXIS: 74 DEGREES
EKG T AXIS: 37 DEGREES
EKG T AXIS: 40 DEGREES
EKG VENTRICULAR RATE: 72 BPM
EKG VENTRICULAR RATE: 97 BPM
GFR AFRICAN AMERICAN: >60 ML/MIN/1.73M2
GFR NON-AFRICAN AMERICAN: >60 ML/MIN/1.73M2
GLUCOSE BLD-MCNC: 111 MG/DL (ref 70–99)
INR BLD: 2.05 INDEX
POTASSIUM SERPL-SCNC: 4.4 MMOL/L (ref 3.5–5.1)
PROTHROMBIN TIME: 23.2 SECONDS (ref 9.12–12.5)
SODIUM BLD-SCNC: 139 MMOL/L (ref 135–145)

## 2019-08-27 PROCEDURE — 93312 ECHO TRANSESOPHAGEAL: CPT

## 2019-08-27 PROCEDURE — 92960 CARDIOVERSION ELECTRIC EXT: CPT

## 2019-08-27 PROCEDURE — 80048 BASIC METABOLIC PNL TOTAL CA: CPT

## 2019-08-27 PROCEDURE — 36415 COLL VENOUS BLD VENIPUNCTURE: CPT

## 2019-08-27 PROCEDURE — 93005 ELECTROCARDIOGRAM TRACING: CPT | Performed by: INTERNAL MEDICINE

## 2019-08-27 PROCEDURE — 92960 CARDIOVERSION ELECTRIC EXT: CPT | Performed by: INTERNAL MEDICINE

## 2019-08-27 PROCEDURE — 2580000003 HC RX 258: Performed by: INTERNAL MEDICINE

## 2019-08-27 PROCEDURE — 85610 PROTHROMBIN TIME: CPT

## 2019-08-27 PROCEDURE — 6360000002 HC RX W HCPCS: Performed by: INTERNAL MEDICINE

## 2019-08-27 PROCEDURE — 93010 ELECTROCARDIOGRAM REPORT: CPT | Performed by: INTERNAL MEDICINE

## 2019-08-27 PROCEDURE — 7100000000 HC PACU RECOVERY - FIRST 15 MIN

## 2019-08-27 PROCEDURE — 7100000001 HC PACU RECOVERY - ADDTL 15 MIN

## 2019-08-27 PROCEDURE — 93325 DOPPLER ECHO COLOR FLOW MAPG: CPT | Performed by: INTERNAL MEDICINE

## 2019-08-27 PROCEDURE — 93312 ECHO TRANSESOPHAGEAL: CPT | Performed by: INTERNAL MEDICINE

## 2019-08-27 RX ADMIN — AMIODARONE HYDROCHLORIDE 150 MG: 50 INJECTION, SOLUTION INTRAVENOUS at 11:07

## 2019-08-28 ENCOUNTER — TELEPHONE (OUTPATIENT)
Dept: CARDIOLOGY CLINIC | Age: 59
End: 2019-08-28

## 2019-09-06 ENCOUNTER — APPOINTMENT (OUTPATIENT)
Dept: GENERAL RADIOLOGY | Age: 59
DRG: 310 | End: 2019-09-06
Payer: COMMERCIAL

## 2019-09-06 ENCOUNTER — HOSPITAL ENCOUNTER (INPATIENT)
Age: 59
LOS: 2 days | Discharge: HOME | DRG: 310 | End: 2019-09-08
Attending: EMERGENCY MEDICINE | Admitting: HOSPITALIST
Payer: COMMERCIAL

## 2019-09-06 ENCOUNTER — OFFICE VISIT (OUTPATIENT)
Dept: CARDIOLOGY CLINIC | Age: 59
End: 2019-09-06
Payer: COMMERCIAL

## 2019-09-06 VITALS
HEART RATE: 145 BPM | DIASTOLIC BLOOD PRESSURE: 70 MMHG | HEIGHT: 67 IN | BODY MASS INDEX: 32.24 KG/M2 | SYSTOLIC BLOOD PRESSURE: 116 MMHG | WEIGHT: 205.4 LBS

## 2019-09-06 DIAGNOSIS — R79.89 ELEVATED BRAIN NATRIURETIC PEPTIDE (BNP) LEVEL: ICD-10-CM

## 2019-09-06 DIAGNOSIS — I48.91 ATRIAL FIBRILLATION, UNSPECIFIED TYPE (HCC): ICD-10-CM

## 2019-09-06 DIAGNOSIS — I48.91 ATRIAL FIBRILLATION WITH RVR (HCC): Primary | ICD-10-CM

## 2019-09-06 LAB
ALBUMIN SERPL-MCNC: 4.4 GM/DL (ref 3.4–5)
ALP BLD-CCNC: 81 IU/L (ref 40–129)
ALT SERPL-CCNC: 29 U/L (ref 10–40)
ANION GAP SERPL CALCULATED.3IONS-SCNC: 13 MMOL/L (ref 4–16)
AST SERPL-CCNC: 24 IU/L (ref 15–37)
BASOPHILS ABSOLUTE: 0 K/CU MM
BASOPHILS RELATIVE PERCENT: 0.5 % (ref 0–1)
BILIRUB SERPL-MCNC: 0.5 MG/DL (ref 0–1)
BUN BLDV-MCNC: 17 MG/DL (ref 6–23)
CALCIUM SERPL-MCNC: 9.7 MG/DL (ref 8.3–10.6)
CHLORIDE BLD-SCNC: 100 MMOL/L (ref 99–110)
CO2: 26 MMOL/L (ref 21–32)
CREAT SERPL-MCNC: 0.9 MG/DL (ref 0.6–1.1)
DIFFERENTIAL TYPE: ABNORMAL
EOSINOPHILS ABSOLUTE: 0.2 K/CU MM
EOSINOPHILS RELATIVE PERCENT: 2.4 % (ref 0–3)
GFR AFRICAN AMERICAN: >60 ML/MIN/1.73M2
GFR NON-AFRICAN AMERICAN: >60 ML/MIN/1.73M2
GLUCOSE BLD-MCNC: 104 MG/DL (ref 70–99)
HCT VFR BLD CALC: 45.9 % (ref 37–47)
HEMOGLOBIN: 14.8 GM/DL (ref 12.5–16)
IMMATURE NEUTROPHIL %: 0.2 % (ref 0–0.43)
LACTATE: 1.1 MMOL/L (ref 0.4–2)
LYMPHOCYTES ABSOLUTE: 2.7 K/CU MM
LYMPHOCYTES RELATIVE PERCENT: 41.4 % (ref 24–44)
MAGNESIUM: 2.1 MG/DL (ref 1.8–2.4)
MCH RBC QN AUTO: 29.9 PG (ref 27–31)
MCHC RBC AUTO-ENTMCNC: 32.2 % (ref 32–36)
MCV RBC AUTO: 92.7 FL (ref 78–100)
MONOCYTES ABSOLUTE: 0.6 K/CU MM
MONOCYTES RELATIVE PERCENT: 9.6 % (ref 0–4)
NUCLEATED RBC %: 0 %
PDW BLD-RTO: 11.9 % (ref 11.7–14.9)
PLATELET # BLD: 257 K/CU MM (ref 140–440)
PMV BLD AUTO: 9.4 FL (ref 7.5–11.1)
POTASSIUM SERPL-SCNC: 4.3 MMOL/L (ref 3.5–5.1)
PRO-BNP: 1958 PG/ML
RBC # BLD: 4.95 M/CU MM (ref 4.2–5.4)
SEGMENTED NEUTROPHILS ABSOLUTE COUNT: 3 K/CU MM
SEGMENTED NEUTROPHILS RELATIVE PERCENT: 45.9 % (ref 36–66)
SODIUM BLD-SCNC: 139 MMOL/L (ref 135–145)
T4 FREE: 1.2 NG/DL (ref 0.9–1.8)
TOTAL CK: 178 IU/L (ref 26–140)
TOTAL IMMATURE NEUTOROPHIL: 0.01 K/CU MM
TOTAL NUCLEATED RBC: 0 K/CU MM
TOTAL PROTEIN: 7.6 GM/DL (ref 6.4–8.2)
TROPONIN T: <0.01 NG/ML
TROPONIN T: <0.01 NG/ML
TSH HIGH SENSITIVITY: 5.9 UIU/ML (ref 0.27–4.2)
WBC # BLD: 6.6 K/CU MM (ref 4–10.5)

## 2019-09-06 PROCEDURE — 6360000002 HC RX W HCPCS: Performed by: NURSE PRACTITIONER

## 2019-09-06 PROCEDURE — 96365 THER/PROPH/DIAG IV INF INIT: CPT

## 2019-09-06 PROCEDURE — 80053 COMPREHEN METABOLIC PANEL: CPT

## 2019-09-06 PROCEDURE — 84439 ASSAY OF FREE THYROXINE: CPT

## 2019-09-06 PROCEDURE — 84443 ASSAY THYROID STIM HORMONE: CPT

## 2019-09-06 PROCEDURE — 36415 COLL VENOUS BLD VENIPUNCTURE: CPT

## 2019-09-06 PROCEDURE — 82550 ASSAY OF CK (CPK): CPT

## 2019-09-06 PROCEDURE — 84484 ASSAY OF TROPONIN QUANT: CPT

## 2019-09-06 PROCEDURE — 83735 ASSAY OF MAGNESIUM: CPT

## 2019-09-06 PROCEDURE — 83605 ASSAY OF LACTIC ACID: CPT

## 2019-09-06 PROCEDURE — 83880 ASSAY OF NATRIURETIC PEPTIDE: CPT

## 2019-09-06 PROCEDURE — 71045 X-RAY EXAM CHEST 1 VIEW: CPT

## 2019-09-06 PROCEDURE — 2140000000 HC CCU INTERMEDIATE R&B

## 2019-09-06 PROCEDURE — 2580000003 HC RX 258: Performed by: NURSE PRACTITIONER

## 2019-09-06 PROCEDURE — 94660 CPAP INITIATION&MGMT: CPT

## 2019-09-06 PROCEDURE — 93005 ELECTROCARDIOGRAM TRACING: CPT | Performed by: EMERGENCY MEDICINE

## 2019-09-06 PROCEDURE — 99214 OFFICE O/P EST MOD 30 MIN: CPT | Performed by: INTERNAL MEDICINE

## 2019-09-06 PROCEDURE — 85025 COMPLETE CBC W/AUTO DIFF WBC: CPT

## 2019-09-06 PROCEDURE — 2500000003 HC RX 250 WO HCPCS: Performed by: EMERGENCY MEDICINE

## 2019-09-06 PROCEDURE — 2580000003 HC RX 258: Performed by: EMERGENCY MEDICINE

## 2019-09-06 PROCEDURE — 99285 EMERGENCY DEPT VISIT HI MDM: CPT

## 2019-09-06 PROCEDURE — 93000 ELECTROCARDIOGRAM COMPLETE: CPT | Performed by: INTERNAL MEDICINE

## 2019-09-06 PROCEDURE — 96361 HYDRATE IV INFUSION ADD-ON: CPT

## 2019-09-06 RX ORDER — SODIUM CHLORIDE 0.9 % (FLUSH) 0.9 %
10 SYRINGE (ML) INJECTION PRN
Status: DISCONTINUED | OUTPATIENT
Start: 2019-09-06 | End: 2019-09-08 | Stop reason: HOSPADM

## 2019-09-06 RX ORDER — POLYETHYLENE GLYCOL 3350 17 G/17G
17 POWDER, FOR SOLUTION ORAL DAILY PRN
Status: DISCONTINUED | OUTPATIENT
Start: 2019-09-06 | End: 2019-09-08 | Stop reason: HOSPADM

## 2019-09-06 RX ORDER — SODIUM CHLORIDE 0.9 % (FLUSH) 0.9 %
10 SYRINGE (ML) INJECTION EVERY 12 HOURS SCHEDULED
Status: DISCONTINUED | OUTPATIENT
Start: 2019-09-06 | End: 2019-09-08 | Stop reason: HOSPADM

## 2019-09-06 RX ORDER — SODIUM CHLORIDE 9 MG/ML
INJECTION, SOLUTION INTRAVENOUS CONTINUOUS
Status: DISCONTINUED | OUTPATIENT
Start: 2019-09-06 | End: 2019-09-08 | Stop reason: HOSPADM

## 2019-09-06 RX ORDER — AMIODARONE HYDROCHLORIDE 200 MG/1
200 TABLET ORAL DAILY
Status: DISCONTINUED | OUTPATIENT
Start: 2019-09-08 | End: 2019-09-07

## 2019-09-06 RX ORDER — DILTIAZEM HYDROCHLORIDE 5 MG/ML
10 INJECTION INTRAVENOUS ONCE
Status: DISCONTINUED | OUTPATIENT
Start: 2019-09-06 | End: 2019-09-08 | Stop reason: HOSPADM

## 2019-09-06 RX ORDER — ONDANSETRON 2 MG/ML
4 INJECTION INTRAMUSCULAR; INTRAVENOUS EVERY 6 HOURS PRN
Status: DISCONTINUED | OUTPATIENT
Start: 2019-09-06 | End: 2019-09-08 | Stop reason: HOSPADM

## 2019-09-06 RX ORDER — 0.9 % SODIUM CHLORIDE 0.9 %
2000 INTRAVENOUS SOLUTION INTRAVENOUS ONCE
Status: COMPLETED | OUTPATIENT
Start: 2019-09-06 | End: 2019-09-06

## 2019-09-06 RX ADMIN — SODIUM CHLORIDE 2000 ML: 9 INJECTION, SOLUTION INTRAVENOUS at 18:26

## 2019-09-06 RX ADMIN — DEXTROSE MONOHYDRATE 150 MG: 50 INJECTION, SOLUTION INTRAVENOUS at 20:13

## 2019-09-06 RX ADMIN — AMIODARONE HYDROCHLORIDE 1 MG/MIN: 50 INJECTION, SOLUTION INTRAVENOUS at 20:27

## 2019-09-06 ASSESSMENT — ENCOUNTER SYMPTOMS
ALLERGIC/IMMUNOLOGIC NEGATIVE: 1
GASTROINTESTINAL NEGATIVE: 1
RESPIRATORY NEGATIVE: 1
EYES NEGATIVE: 1

## 2019-09-06 ASSESSMENT — PAIN SCALES - GENERAL: PAINLEVEL_OUTOF10: 0

## 2019-09-06 NOTE — ED NOTES
Report received from [facility], [address] [place] [address]. Care assumed.       Demarco Koo RN  09/06/19 2118

## 2019-09-06 NOTE — H&P
in her mother; High Cholesterol in her mother; Hypertension in her father. Soc HX:   Social History     Socioeconomic History    Marital status:      Spouse name: None    Number of children: None    Years of education: None    Highest education level: None   Occupational History    None   Social Needs    Financial resource strain: None    Food insecurity:     Worry: None     Inability: None    Transportation needs:     Medical: None     Non-medical: None   Tobacco Use    Smoking status: Never Smoker    Smokeless tobacco: Never Used   Substance and Sexual Activity    Alcohol use: Never     Frequency: Never    Drug use: Never    Sexual activity: Yes     Partners: Male   Lifestyle    Physical activity:     Days per week: None     Minutes per session: None    Stress: None   Relationships    Social connections:     Talks on phone: None     Gets together: None     Attends Gnosticist service: None     Active member of club or organization: None     Attends meetings of clubs or organizations: None     Relationship status: None    Intimate partner violence:     Fear of current or ex partner: None     Emotionally abused: None     Physically abused: None     Forced sexual activity: None   Other Topics Concern    None   Social History Narrative    None     TOBACCO:   reports that she has never smoked. She has never used smokeless tobacco.  ETOH:   reports that she does not drink alcohol. Drugs:  reports that she does not use drugs. Allergies: Allergies   Allergen Reactions    Codeine      Nausea and vomiting        Home Medications:     Prior to Admission medications    Medication Sig Start Date End Date Taking?  Authorizing Provider   rivaroxaban (XARELTO) 20 MG TABS tablet Take 1 tablet by mouth daily (with breakfast) 9/6/19  Yes Margareth Humphries MD   CPAP Machine MISC by Does not apply route every evening   Yes Historical Provider, MD   amiodarone (CORDARONE) 200 MG tablet Take 1 tablet

## 2019-09-06 NOTE — ED NOTES
Care and report to Janeth Monahan, [facility].       John Lanier RN  09/06/19 1062

## 2019-09-06 NOTE — PROGRESS NOTES
Hyperlipidemia. and presents with     Plan:  1. Cardiomyopathy: NORMAL LVEF ON ALAN AND NORMAL CORONARIES  2. Aflutter/afib: failied cardioversion, will send to ED for readmission and IV cardizem and dig, she will eventually need ablation,. start IV amiodarone in hospital  3. OBEISTY RECOMMEDN TO LOSE WEIGHT  4. SLEEP APNEA: CONTINUE CPAP ,   Dyslipidemia: stable. Health maintenance: exerise and dietAll labs, medications and tests reviewed, continue all other medications of all above medical condition listed as is.     [unfilled]

## 2019-09-06 NOTE — ED PROVIDER NOTES
tablet Take 1 tablet by mouth daily (with breakfast) 90 tablet 1    CPAP Machine MISC by Does not apply route every evening      amiodarone (CORDARONE) 200 MG tablet Take 1 tablet by mouth daily 30 tablet 3    aspirin 325 MG EC tablet Take 1 tablet by mouth daily 30 tablet 3    diltiazem (CARDIZEM CD) 240 MG extended release capsule Take 1 capsule by mouth daily 180 capsule 3      Allergies   Allergen Reactions    Codeine      Nausea and vomiting      Current Facility-Administered Medications   Medication Dose Route Frequency Provider Last Rate Last Dose    diltiazem 125 mg in dextrose 5 % 125 mL infusion  5 mg/hr Intravenous Continuous Justin Noble, DO        0.9 % sodium chloride bolus  2,000 mL Intravenous Once CloudSplit, DO 2,000 mL/hr at 09/06/19 1826 2,000 mL at 09/06/19 1826     Current Outpatient Medications   Medication Sig Dispense Refill    rivaroxaban (XARELTO) 20 MG TABS tablet Take 1 tablet by mouth daily (with breakfast) 90 tablet 1    CPAP Machine MISC by Does not apply route every evening      amiodarone (CORDARONE) 200 MG tablet Take 1 tablet by mouth daily 30 tablet 3    aspirin 325 MG EC tablet Take 1 tablet by mouth daily 30 tablet 3    diltiazem (CARDIZEM CD) 240 MG extended release capsule Take 1 capsule by mouth daily 180 capsule 3       Nursing Notes Reviewed    VITAL SIGNS:  ED Triage Vitals [09/06/19 1643]   Enc Vitals Group      BP (!) 173/144      Pulse 138      Resp 18      Temp       Temp src       SpO2 98 %      Weight 205 lb (93 kg)      Height       Head Circumference       Peak Flow       Pain Score       Pain Loc       Pain Edu? Excl. in [address]? PHYSICAL EXAM:  Physical Exam   Constitutional: She is oriented to person, place, and time. She appears well-developed and well-nourished. She is active and cooperative. Non-toxic appearance. She does not have a sickly appearance. She does not appear ill. No distress.    HENT:   Head: Normocephalic and

## 2019-09-07 LAB
ALBUMIN SERPL-MCNC: 3.7 GM/DL (ref 3.4–5)
ALP BLD-CCNC: 73 IU/L (ref 40–128)
ALT SERPL-CCNC: 26 U/L (ref 10–40)
ANION GAP SERPL CALCULATED.3IONS-SCNC: 10 MMOL/L (ref 4–16)
AST SERPL-CCNC: 25 IU/L (ref 15–37)
BACTERIA: NEGATIVE /HPF
BASOPHILS ABSOLUTE: 0 K/CU MM
BASOPHILS RELATIVE PERCENT: 0.4 % (ref 0–1)
BILIRUB SERPL-MCNC: 0.7 MG/DL (ref 0–1)
BILIRUBIN URINE: NEGATIVE MG/DL
BLOOD, URINE: ABNORMAL
BUN BLDV-MCNC: 13 MG/DL (ref 6–23)
CALCIUM SERPL-MCNC: 9 MG/DL (ref 8.3–10.6)
CHLORIDE BLD-SCNC: 103 MMOL/L (ref 99–110)
CLARITY: CLEAR
CO2: 26 MMOL/L (ref 21–32)
COLOR: ABNORMAL
CREAT SERPL-MCNC: 0.8 MG/DL (ref 0.6–1.1)
DIFFERENTIAL TYPE: ABNORMAL
EOSINOPHILS ABSOLUTE: 0.2 K/CU MM
EOSINOPHILS RELATIVE PERCENT: 3 % (ref 0–3)
GFR AFRICAN AMERICAN: >60 ML/MIN/1.73M2
GFR NON-AFRICAN AMERICAN: >60 ML/MIN/1.73M2
GLUCOSE BLD-MCNC: 122 MG/DL (ref 70–99)
GLUCOSE, URINE: 50 MG/DL
HCT VFR BLD CALC: 44.6 % (ref 37–47)
HEMOGLOBIN: 14.3 GM/DL (ref 12.5–16)
IMMATURE NEUTROPHIL %: 0.2 % (ref 0–0.43)
KETONES, URINE: NEGATIVE MG/DL
LACTATE: 1 MMOL/L (ref 0.4–2)
LEUKOCYTE ESTERASE, URINE: NEGATIVE
LYMPHOCYTES ABSOLUTE: 1.8 K/CU MM
LYMPHOCYTES RELATIVE PERCENT: 36.1 % (ref 24–44)
MAGNESIUM: 2 MG/DL (ref 1.8–2.4)
MCH RBC QN AUTO: 30.1 PG (ref 27–31)
MCHC RBC AUTO-ENTMCNC: 32.1 % (ref 32–36)
MCV RBC AUTO: 93.9 FL (ref 78–100)
MONOCYTES ABSOLUTE: 0.5 K/CU MM
MONOCYTES RELATIVE PERCENT: 10.8 % (ref 0–4)
NITRITE URINE, QUANTITATIVE: NEGATIVE
NUCLEATED RBC %: 0 %
PDW BLD-RTO: 11.9 % (ref 11.7–14.9)
PH, URINE: 6 (ref 5–8)
PLATELET # BLD: 218 K/CU MM (ref 140–440)
PMV BLD AUTO: 9.7 FL (ref 7.5–11.1)
POTASSIUM SERPL-SCNC: 4.8 MMOL/L (ref 3.5–5.1)
PROTEIN UA: NEGATIVE MG/DL
RBC # BLD: 4.75 M/CU MM (ref 4.2–5.4)
RBC URINE: <1 /HPF (ref 0–6)
SEGMENTED NEUTROPHILS ABSOLUTE COUNT: 2.5 K/CU MM
SEGMENTED NEUTROPHILS RELATIVE PERCENT: 49.5 % (ref 36–66)
SODIUM BLD-SCNC: 139 MMOL/L (ref 135–145)
SPECIFIC GRAVITY UA: 1 (ref 1–1.03)
SQUAMOUS EPITHELIAL: <1 /HPF
TOTAL IMMATURE NEUTOROPHIL: 0.01 K/CU MM
TOTAL NUCLEATED RBC: 0 K/CU MM
TOTAL PROTEIN: 6.3 GM/DL (ref 6.4–8.2)
TRICHOMONAS: ABNORMAL /HPF
TROPONIN T: <0.01 NG/ML
UROBILINOGEN, URINE: NORMAL MG/DL (ref 0.2–1)
WBC # BLD: 5 K/CU MM (ref 4–10.5)
WBC UA: <1 /HPF (ref 0–5)

## 2019-09-07 PROCEDURE — 2140000000 HC CCU INTERMEDIATE R&B

## 2019-09-07 PROCEDURE — 94761 N-INVAS EAR/PLS OXIMETRY MLT: CPT

## 2019-09-07 PROCEDURE — 85025 COMPLETE CBC W/AUTO DIFF WBC: CPT

## 2019-09-07 PROCEDURE — 2580000003 HC RX 258: Performed by: NURSE PRACTITIONER

## 2019-09-07 PROCEDURE — 36415 COLL VENOUS BLD VENIPUNCTURE: CPT

## 2019-09-07 PROCEDURE — 6370000000 HC RX 637 (ALT 250 FOR IP): Performed by: NURSE PRACTITIONER

## 2019-09-07 PROCEDURE — 93010 ELECTROCARDIOGRAM REPORT: CPT | Performed by: INTERNAL MEDICINE

## 2019-09-07 PROCEDURE — 84484 ASSAY OF TROPONIN QUANT: CPT

## 2019-09-07 PROCEDURE — 83605 ASSAY OF LACTIC ACID: CPT

## 2019-09-07 PROCEDURE — 83735 ASSAY OF MAGNESIUM: CPT

## 2019-09-07 PROCEDURE — 80053 COMPREHEN METABOLIC PANEL: CPT

## 2019-09-07 PROCEDURE — 2500000003 HC RX 250 WO HCPCS: Performed by: NURSE PRACTITIONER

## 2019-09-07 PROCEDURE — 99253 IP/OBS CNSLTJ NEW/EST LOW 45: CPT | Performed by: INTERNAL MEDICINE

## 2019-09-07 PROCEDURE — 6370000000 HC RX 637 (ALT 250 FOR IP): Performed by: INTERNAL MEDICINE

## 2019-09-07 PROCEDURE — 81001 URINALYSIS AUTO W/SCOPE: CPT

## 2019-09-07 RX ORDER — AMIODARONE HYDROCHLORIDE 200 MG/1
200 TABLET ORAL 2 TIMES DAILY
Status: DISCONTINUED | OUTPATIENT
Start: 2019-09-07 | End: 2019-09-08 | Stop reason: HOSPADM

## 2019-09-07 RX ADMIN — FAMOTIDINE 20 MG: 10 INJECTION, SOLUTION INTRAVENOUS at 20:37

## 2019-09-07 RX ADMIN — Medication 10 ML: at 20:37

## 2019-09-07 RX ADMIN — RIVAROXABAN 20 MG: 20 TABLET, FILM COATED ORAL at 11:38

## 2019-09-07 RX ADMIN — Medication 10 ML: at 11:37

## 2019-09-07 RX ADMIN — METOPROLOL TARTRATE 25 MG: 25 TABLET ORAL at 11:38

## 2019-09-07 RX ADMIN — FAMOTIDINE 20 MG: 10 INJECTION, SOLUTION INTRAVENOUS at 11:37

## 2019-09-07 RX ADMIN — SODIUM CHLORIDE: 9 INJECTION, SOLUTION INTRAVENOUS at 20:36

## 2019-09-07 RX ADMIN — Medication 10 ML: at 00:07

## 2019-09-07 RX ADMIN — AMIODARONE HYDROCHLORIDE 200 MG: 200 TABLET ORAL at 20:37

## 2019-09-07 RX ADMIN — ASPIRIN 325 MG: 325 TABLET, DELAYED RELEASE ORAL at 11:38

## 2019-09-07 RX ADMIN — AMIODARONE HYDROCHLORIDE 200 MG: 200 TABLET ORAL at 11:39

## 2019-09-07 RX ADMIN — SODIUM CHLORIDE: 9 INJECTION, SOLUTION INTRAVENOUS at 00:07

## 2019-09-07 ASSESSMENT — PAIN SCALES - GENERAL
PAINLEVEL_OUTOF10: 0

## 2019-09-07 NOTE — PLAN OF CARE
Problem: Safety:  Goal: Free from accidental physical injury  Description  Free from accidental physical injury  Outcome: Ongoing  Goal: Free from intentional harm  Description  Free from intentional harm  Outcome: Ongoing     Problem: Daily Care:  Goal: Daily care needs are met  Description  Daily care needs are met  Outcome: Ongoing     Problem: Discharge Planning:  Goal: Patients continuum of care needs are met  Description  Patients continuum of care needs are met  Outcome: Ongoing

## 2019-09-07 NOTE — CONSULTS
CREATININE 0.8     Recent Labs     09/07/19  0549   AST 25   ALT 26   BILITOT 0.7   ALKPHOS 73     Recent Labs     09/06/19  1710 09/06/19  2213 09/07/19  0549   TROPONINT <0.010 <0.010 <0.010       Recent Labs     09/06/19  1710   PROBNP 1,958*     Lab Results   Component Value Date    INR 2.05 08/27/2019    PROTIME 23.2 (H) 08/27/2019       EKG: (reviewed by myself)    ECHO:(reviewed by myself)    Chest Xray:(reviewed by myself)  Xr Chest Standard (2 Vw)    Result Date: 8/13/2019  EXAMINATION: TWO XRAY VIEWS OF THE CHEST 8/13/2019 7:43 am COMPARISON: Chest x-ray, 04/17/2019 HISTORY: ORDERING SYSTEM PROVIDED HISTORY: Preop examination TECHNOLOGIST PROVIDED HISTORY: Reason for Exam: pre cardiac cath testing FINDINGS: The cardiomediastinal contours are stable with cardiomegaly and atherosclerotic aortic calcification. The lungs are clear bilaterally. There is no evidence of focal consolidation, pulmonary edema, pleural effusion or pneumothorax. There is no blunting of the costophrenic angles on the lateral view. Cholecystectomy clips noted overlying the right upper quadrant. Stable chest with no acute cardiopulmonary process. Xr Chest Portable    Result Date: 9/6/2019  EXAMINATION: ONE XRAY VIEW OF THE CHEST 9/6/2019 4:54 pm COMPARISON: Chest radiograph 08/13/2019. HISTORY: ORDERING SYSTEM PROVIDED HISTORY: afib rvr TECHNOLOGIST PROVIDED HISTORY: Reason for exam:->afib rvr Reason for Exam: afib, rvr Relevant Medical/Surgical History: afib, cardiac cath Initial encounter. FINDINGS: The lungs are without acute focal process. There is no effusion or pneumothorax. The cardiomediastinal silhouette is without acute process. The osseous structures are without acute process. No acute process. All labs, medications and tests reviewed by myself including data  from outside source , patient and available family . Continue all other medications of all above medical condition listed as is.

## 2019-09-08 VITALS
TEMPERATURE: 98 F | BODY MASS INDEX: 33.09 KG/M2 | HEIGHT: 67 IN | HEART RATE: 89 BPM | WEIGHT: 210.8 LBS | SYSTOLIC BLOOD PRESSURE: 113 MMHG | RESPIRATION RATE: 18 BRPM | OXYGEN SATURATION: 96 % | DIASTOLIC BLOOD PRESSURE: 81 MMHG

## 2019-09-08 PROCEDURE — 2500000003 HC RX 250 WO HCPCS: Performed by: NURSE PRACTITIONER

## 2019-09-08 PROCEDURE — 6370000000 HC RX 637 (ALT 250 FOR IP): Performed by: INTERNAL MEDICINE

## 2019-09-08 PROCEDURE — 94761 N-INVAS EAR/PLS OXIMETRY MLT: CPT

## 2019-09-08 PROCEDURE — 99232 SBSQ HOSP IP/OBS MODERATE 35: CPT | Performed by: INTERNAL MEDICINE

## 2019-09-08 PROCEDURE — 6370000000 HC RX 637 (ALT 250 FOR IP): Performed by: NURSE PRACTITIONER

## 2019-09-08 PROCEDURE — 2580000003 HC RX 258: Performed by: NURSE PRACTITIONER

## 2019-09-08 RX ORDER — AMIODARONE HYDROCHLORIDE 200 MG/1
TABLET ORAL
Qty: 30 TABLET | Refills: 3
Start: 2019-09-08 | End: 2020-06-24 | Stop reason: ALTCHOICE

## 2019-09-08 RX ADMIN — Medication 10 ML: at 10:44

## 2019-09-08 RX ADMIN — ASPIRIN 325 MG: 325 TABLET, DELAYED RELEASE ORAL at 10:44

## 2019-09-08 RX ADMIN — RIVAROXABAN 20 MG: 20 TABLET, FILM COATED ORAL at 10:45

## 2019-09-08 RX ADMIN — METOPROLOL TARTRATE 25 MG: 25 TABLET ORAL at 10:45

## 2019-09-08 RX ADMIN — AMIODARONE HYDROCHLORIDE 200 MG: 200 TABLET ORAL at 10:44

## 2019-09-08 RX ADMIN — FAMOTIDINE 20 MG: 10 INJECTION, SOLUTION INTRAVENOUS at 10:44

## 2019-09-08 ASSESSMENT — PAIN SCALES - GENERAL
PAINLEVEL_OUTOF10: 0
PAINLEVEL_OUTOF10: 0

## 2019-09-08 NOTE — PROGRESS NOTES
Exam:  Constitutional:  Well developed, Well nourished, No acute distress, Non-toxic appearance. HENT:  Normocephalic, Atraumatic, Bilateral external ears normal, Oropharynx moist, No oral exudates, Nose normal. Neck- Normal range of motion, No tenderness, Supple, No stridor. Eyes:  PERRL, EOMI, Conjunctiva normal, No discharge. Respiratory:  Normal breath sounds, No respiratory distress, No wheezing, No chest tenderness. Cardiovascular:  Normal heart rate, Normal rhythm, No murmurs, No rubs, No gallops, JVP not elevated  Abdomen/GI:  Bowel sounds normal, Soft, No tenderness, No masses, No pulsatile masses. Musculoskeletal:  Intact distal pulses, No edema, No tenderness, No cyanosis, No clubbing. Good range of motion in all major joints. No tenderness to palpation or major deformities noted. Back- No tenderness. Integument:  Warm, Dry, No erythema, No rash. Lymphatic:  No lymphadenopathy noted. Neurologic:  Alert & oriented x 3, Normal motor function, Normal sensory function, No focal deficits noted. Psychiatric:  Affect  and  Mood :no change    Medications:    amiodarone  200 mg Oral BID    metoprolol tartrate  25 mg Oral BID    diltiazem  10 mg Intravenous Once    sodium chloride flush  10 mL Intravenous 2 times per day    famotidine (PEPCID) injection  20 mg Intravenous BID    rivaroxaban  20 mg Oral Daily with breakfast    aspirin  325 mg Oral Daily      sodium chloride 50 mL/hr at 09/07/19 2036     sodium chloride flush, ondansetron, polyethylene glycol    Lab Data:  CBC:   Recent Labs     09/06/19  1710 09/07/19  0549   WBC 6.6 5.0   HGB 14.8 14.3   HCT 45.9 44.6   MCV 92.7 93.9    218     BMP:   Recent Labs     09/06/19  1710 09/07/19  0549    139   K 4.3 4.8    103   CO2 26 26   BUN 17 13   CREATININE 0.9 0.8     PT/INR: No results for input(s): PROTIME, INR in the last 72 hours.   BNP:    Recent Labs     09/06/19  1710   PROBNP 1,958*     TROPONIN:   Recent Labs

## 2019-09-08 NOTE — DISCHARGE SUMMARY
auscultation, no wheezes, rales or rhonchi. Symmetric chest movement . CARDIO/VASC           S1/S2 auscultated. irregular. GI        Abdomen is soft without significant tenderness, Bowel sounds are normoactive. MSK    No gross joint deformities. Spontaneous movement of all extremities  SKIN    Normal coloration, warm, dry. NEURO           normal speech, no lateralizing weakness. PSYCH            Awake, alert, oriented x 4. Affect appropriate.     BMP/CBC  Recent Labs     09/06/19  1710 09/07/19  0549    139   K 4.3 4.8    103   CO2 26 26   BUN 17 13   CREATININE 0.9 0.8   WBC 6.6 5.0   HCT 45.9 44.6    218       IMAGING:  xr Chest Portable    Result Date: 9/6/2019  EXAMINATION: ONE XRAY VIEW OF THE CHEST 9/6/2019 4:54 pm COMPARISON: Chest radiograph 08/13/2019. HISTORY: ORDERING SYSTEM PROVIDED HISTORY: afib rvr TECHNOLOGIST PROVIDED HISTORY: Reason for exam:->afib rvr Reason for Exam: afib, rvr Relevant Medical/Surgical History: afib, cardiac cath Initial encounter. FINDINGS: The lungs are without acute focal process. There is no effusion or pneumothorax. The cardiomediastinal silhouette is without acute process. The osseous structures are without acute process. No acute process.        Discharge Time of 33 minutes    Electronically signed by Erin Spring MD on 9/8/2019 at 11:12 AM

## 2019-09-09 LAB
EKG ATRIAL RATE: 138 BPM
EKG DIAGNOSIS: NORMAL
EKG Q-T INTERVAL: 300 MS
EKG QRS DURATION: 72 MS
EKG QTC CALCULATION (BAZETT): 454 MS
EKG R AXIS: 41 DEGREES
EKG T AXIS: 4 DEGREES
EKG VENTRICULAR RATE: 138 BPM

## 2019-09-18 ENCOUNTER — HOSPITAL ENCOUNTER (OUTPATIENT)
Dept: SLEEP CENTER | Age: 59
Discharge: HOME | End: 2019-09-18
Payer: COMMERCIAL

## 2019-09-18 PROCEDURE — 9990000010 HC NO CHARGE VISIT: Performed by: PSYCHIATRY & NEUROLOGY

## 2019-09-18 ASSESSMENT — SLEEP AND FATIGUE QUESTIONNAIRES
HOW LIKELY ARE YOU TO NOD OFF OR FALL ASLEEP WHILE SITTING INACTIVE IN A PUBLIC PLACE: 0
ESS TOTAL SCORE: 0
HOW LIKELY ARE YOU TO NOD OFF OR FALL ASLEEP IN A CAR, WHILE STOPPED FOR A FEW MINUTES IN TRAFFIC: 0
HOW LIKELY ARE YOU TO NOD OFF OR FALL ASLEEP WHILE SITTING AND TALKING TO SOMEONE: 0
HOW LIKELY ARE YOU TO NOD OFF OR FALL ASLEEP WHEN YOU ARE A PASSENGER IN A CAR FOR AN HOUR WITHOUT A BREAK: 0
HOW LIKELY ARE YOU TO NOD OFF OR FALL ASLEEP WHILE LYING DOWN TO REST IN THE AFTERNOON WHEN CIRCUMSTANCES PERMIT: 0
HOW LIKELY ARE YOU TO NOD OFF OR FALL ASLEEP WHILE SITTING QUIETLY AFTER LUNCH WITHOUT ALCOHOL: 0
HOW LIKELY ARE YOU TO NOD OFF OR FALL ASLEEP WHILE SITTING AND READING: 0
HOW LIKELY ARE YOU TO NOD OFF OR FALL ASLEEP WHILE WATCHING TV: 0

## 2019-09-18 NOTE — PROGRESS NOTES
Gets together: Not on file     Attends [employer]'s witness service: Not on file     Active member of club or organization: Not on file     Attends meetings of clubs or organizations: Not on file     Relationship status: Not on file    Intimate partner violence:     Fear of current or ex partner: Not on file     Emotionally abused: Not on file     Physically abused: Not on file     Forced sexual activity: Not on file   Other Topics Concern    Not on file   Social History Narrative    Not on file       Prior to Admission medications    Medication Sig Start Date End Date Taking? Authorizing Provider   metoprolol tartrate (LOPRESSOR) 25 MG tablet Take 1 tablet by mouth 2 times daily 9/9/19  Yes Brisa Mcfarland, APRN - CNP   amiodarone (CORDARONE) 200 MG tablet 200 mg bid for 1 week and then 200 mg daily 9/8/19  Yes Kyle Olivarez MD   rivaroxaban (Leim Couch) 20 MG TABS tablet Take 1 tablet by mouth daily (with breakfast) 9/6/19  Yes Dinora Ahumada MD   CPAP Machine MISC by Does not apply route every evening   Yes Historical Provider, MD   aspirin 325 MG EC tablet Take 1 tablet by mouth daily 4/19/19  Yes Ellen Wiseman MD       Allergies as of 09/18/2019 - Review Complete 09/18/2019   Allergen Reaction Noted    Codeine  04/17/2019       Patient Active Problem List   Diagnosis    Atrial fibrillation with RVR [address] [address]    Shortness of breath       Past Medical History:   Diagnosis Date    Atrial fibrillation ([facility] [address]     H/O cardiac catheterization 08/15/2019     normal coronaries, normal LVEF    History of cardioversion 08/15/2019    Patient is successfully cardioverted into NSR.  History of cardioversion 08/27/2019    Impression: Patient is successfully cardioverted into NSR.  History of transesophageal echocardiography (ALAN) 08/15/2019    There is no evidence of thrombus in the Left Atrial Appendage.  Negative Buble Study, Mod MR, Mild TR    History of transesophageal echocardiography (ALAN) 08/27/2019     Sclerotic Improved [] Not Improved   [] Sleep Paralysis    [] Improved [] Not Improved   [] Muscle Weakness w/ Emotion  [] Improved [] Not Improved  [] Other :     CPAP Usage:    [x]  Patient has never worn CPAP  []  Patient has worn CPAP previously but discontinued use  [x]  Current PAP user,  [ 6 weeeks ]   [x]  Patient Tolerates Well   []  Patient Does Not Tolerate     [x]  Patient Uses CPAP      [x]  More Than 4 Hours      []  Less Than 4 Hours  [x]  CPAP/BPAP/ASV Pressure Readings   []  CPAP Pressure  8    cm H20   []  BPAP Pressure       cm H20   []  ASV Pressure         cm H20      Assessment:      Diagnosis:     ANISA  Hyoersomnia  A Fib      Patient Active Problem List    Diagnosis Date Noted    Shortness of breath     Atrial fibrillation with RVR ([address] 04/17/2019     Plan:        Sleep Study:     [x]  Sleep hygiene/ relaxation methods & CBTi principles review with patient     []  HST - Home Sleep Study   []  PSG - Overnight Diagnostic Polysomnogram     []  CPAP Titration    [] Split Night Study    [] BiLevel Titration    [] ASV - Auto-Servo Ventilation Titration       []  MSLT - Multiple Sleep Latency Test   []  MWT - Maintenance of Wakefulness Test    CPAP Therapy:     []  Patient to be seen for new mask fitting/desensitization   []  AutoPAP Titration    [x]  CPAP supplies and equipment at ___8_____cmH2O    [x]  Continue same CPAP pressure  8 cm   []  Change CPAP pressure to _______cm H2O   []  CPAP supplies only, no pressure change   []  Refer for an oral appliance       Medications:       []  Continue current medication    []  Add Medication:  ________________    Follow-Up:     []  No follow up required. Patient to return as needed. []  2 weeks   []  4 weeks   []  2 months   []  4 months   [x]  6 months   []  1 year for CPAP compliance evaluation. Patient to return sooner, as needed.      []  Follow up after sleep study   []  Other: ______________    No orders of the defined types were placed in this

## 2019-10-07 ENCOUNTER — OFFICE VISIT (OUTPATIENT)
Dept: CARDIOLOGY CLINIC | Age: 59
End: 2019-10-07
Payer: COMMERCIAL

## 2019-10-07 VITALS
DIASTOLIC BLOOD PRESSURE: 82 MMHG | BODY MASS INDEX: 32.8 KG/M2 | HEIGHT: 67 IN | WEIGHT: 209 LBS | HEART RATE: 74 BPM | SYSTOLIC BLOOD PRESSURE: 126 MMHG

## 2019-10-07 DIAGNOSIS — I48.20 CHRONIC ATRIAL FIBRILLATION (HCC): Primary | ICD-10-CM

## 2019-10-07 PROCEDURE — 99214 OFFICE O/P EST MOD 30 MIN: CPT | Performed by: INTERNAL MEDICINE

## 2019-10-07 PROCEDURE — 93000 ELECTROCARDIOGRAM COMPLETE: CPT | Performed by: INTERNAL MEDICINE

## 2019-10-16 ENCOUNTER — TELEPHONE (OUTPATIENT)
Dept: CARDIOLOGY CLINIC | Age: 59
End: 2019-10-16

## 2019-10-18 ENCOUNTER — TELEPHONE (OUTPATIENT)
Dept: CARDIOLOGY CLINIC | Age: 59
End: 2019-10-18

## 2019-10-18 ENCOUNTER — OFFICE VISIT (OUTPATIENT)
Dept: FAMILY MEDICINE CLINIC | Age: 59
End: 2019-10-18
Payer: COMMERCIAL

## 2019-10-18 VITALS
HEART RATE: 90 BPM | HEIGHT: 67 IN | SYSTOLIC BLOOD PRESSURE: 114 MMHG | BODY MASS INDEX: 32.8 KG/M2 | OXYGEN SATURATION: 98 % | DIASTOLIC BLOOD PRESSURE: 68 MMHG | RESPIRATION RATE: 18 BRPM | WEIGHT: 209 LBS | TEMPERATURE: 99.1 F

## 2019-10-18 DIAGNOSIS — K52.9 ACUTE GASTROENTERITIS: Primary | ICD-10-CM

## 2019-10-18 DIAGNOSIS — R68.89 FLU-LIKE SYMPTOMS: ICD-10-CM

## 2019-10-18 LAB
INFLUENZA VIRUS A RNA: NEGATIVE
INFLUENZA VIRUS B RNA: NEGATIVE

## 2019-10-18 PROCEDURE — 99203 OFFICE O/P NEW LOW 30 MIN: CPT | Performed by: NURSE PRACTITIONER

## 2019-10-18 PROCEDURE — 87502 INFLUENZA DNA AMP PROBE: CPT | Performed by: NURSE PRACTITIONER

## 2019-10-18 ASSESSMENT — ENCOUNTER SYMPTOMS
DIARRHEA: 0
TROUBLE SWALLOWING: 0
PHOTOPHOBIA: 0
VOMITING: 1
SINUS PRESSURE: 0
ANAL BLEEDING: 0
SHORTNESS OF BREATH: 0
BLOOD IN STOOL: 0
EYE REDNESS: 0
CHEST TIGHTNESS: 0
EYE DISCHARGE: 0
EYE ITCHING: 0
EYE PAIN: 0
WHEEZING: 0
BACK PAIN: 0
CONSTIPATION: 0
SORE THROAT: 0
NAUSEA: 1
COUGH: 0
VOICE CHANGE: 0
ABDOMINAL DISTENTION: 0
SINUS PAIN: 0
ABDOMINAL PAIN: 0
RHINORRHEA: 1
RECTAL PAIN: 0
EYES NEGATIVE: 1

## 2019-10-18 ASSESSMENT — PATIENT HEALTH QUESTIONNAIRE - PHQ9
SUM OF ALL RESPONSES TO PHQ QUESTIONS 1-9: 0
1. LITTLE INTEREST OR PLEASURE IN DOING THINGS: 0
2. FEELING DOWN, DEPRESSED OR HOPELESS: 0
SUM OF ALL RESPONSES TO PHQ QUESTIONS 1-9: 0
SUM OF ALL RESPONSES TO PHQ9 QUESTIONS 1 & 2: 0

## 2019-10-19 ENCOUNTER — APPOINTMENT (OUTPATIENT)
Dept: GENERAL RADIOLOGY | Age: 59
End: 2019-10-19
Payer: COMMERCIAL

## 2019-10-19 ENCOUNTER — HOSPITAL ENCOUNTER (EMERGENCY)
Age: 59
Discharge: HOME | End: 2019-10-19
Attending: EMERGENCY MEDICINE
Payer: COMMERCIAL

## 2019-10-19 VITALS
WEIGHT: 205 LBS | SYSTOLIC BLOOD PRESSURE: 137 MMHG | TEMPERATURE: 99.7 F | HEART RATE: 110 BPM | DIASTOLIC BLOOD PRESSURE: 87 MMHG | RESPIRATION RATE: 27 BRPM | HEIGHT: 67 IN | OXYGEN SATURATION: 96 % | BODY MASS INDEX: 32.18 KG/M2

## 2019-10-19 DIAGNOSIS — L03.116 CELLULITIS OF LEFT LOWER EXTREMITY: Primary | ICD-10-CM

## 2019-10-19 LAB
ANION GAP SERPL CALCULATED.3IONS-SCNC: 9 MMOL/L (ref 4–16)
BASOPHILS ABSOLUTE: 0 K/CU MM
BASOPHILS RELATIVE PERCENT: 0.2 % (ref 0–1)
BUN BLDV-MCNC: 18 MG/DL (ref 6–23)
CALCIUM SERPL-MCNC: 8.9 MG/DL (ref 8.3–10.6)
CHLORIDE BLD-SCNC: 98 MMOL/L (ref 99–110)
CO2: 29 MMOL/L (ref 21–32)
CREAT SERPL-MCNC: 0.8 MG/DL (ref 0.6–1.1)
DIFFERENTIAL TYPE: ABNORMAL
EOSINOPHILS ABSOLUTE: 0 K/CU MM
EOSINOPHILS RELATIVE PERCENT: 0.4 % (ref 0–3)
ERYTHROCYTE SEDIMENTATION RATE: 81 MM/HR (ref 0–30)
GFR AFRICAN AMERICAN: >60 ML/MIN/1.73M2
GFR NON-AFRICAN AMERICAN: >60 ML/MIN/1.73M2
GLUCOSE BLD-MCNC: 99 MG/DL (ref 70–99)
HCT VFR BLD CALC: 40.3 % (ref 37–47)
HEMOGLOBIN: 12.9 GM/DL (ref 12.5–16)
IMMATURE NEUTROPHIL %: 0.4 % (ref 0–0.43)
LACTATE: 0.8 MMOL/L (ref 0.4–2)
LYMPHOCYTES ABSOLUTE: 1.8 K/CU MM
LYMPHOCYTES RELATIVE PERCENT: 18.5 % (ref 24–44)
MCH RBC QN AUTO: 30.3 PG (ref 27–31)
MCHC RBC AUTO-ENTMCNC: 32 % (ref 32–36)
MCV RBC AUTO: 94.6 FL (ref 78–100)
MONOCYTES ABSOLUTE: 1.2 K/CU MM
MONOCYTES RELATIVE PERCENT: 12.1 % (ref 0–4)
NUCLEATED RBC %: 0 %
PDW BLD-RTO: 12.4 % (ref 11.7–14.9)
PLATELET # BLD: 222 K/CU MM (ref 140–440)
PMV BLD AUTO: 10 FL (ref 7.5–11.1)
POTASSIUM SERPL-SCNC: 3.6 MMOL/L (ref 3.5–5.1)
RBC # BLD: 4.26 M/CU MM (ref 4.2–5.4)
SEGMENTED NEUTROPHILS ABSOLUTE COUNT: 6.7 K/CU MM
SEGMENTED NEUTROPHILS RELATIVE PERCENT: 68.4 % (ref 36–66)
SODIUM BLD-SCNC: 136 MMOL/L (ref 135–145)
TOTAL IMMATURE NEUTOROPHIL: 0.04 K/CU MM
TOTAL NUCLEATED RBC: 0 K/CU MM
WBC # BLD: 9.8 K/CU MM (ref 4–10.5)

## 2019-10-19 PROCEDURE — 83605 ASSAY OF LACTIC ACID: CPT

## 2019-10-19 PROCEDURE — 93005 ELECTROCARDIOGRAM TRACING: CPT | Performed by: EMERGENCY MEDICINE

## 2019-10-19 PROCEDURE — 99284 EMERGENCY DEPT VISIT MOD MDM: CPT

## 2019-10-19 PROCEDURE — 85025 COMPLETE CBC W/AUTO DIFF WBC: CPT

## 2019-10-19 PROCEDURE — 85652 RBC SED RATE AUTOMATED: CPT

## 2019-10-19 PROCEDURE — 80048 BASIC METABOLIC PNL TOTAL CA: CPT

## 2019-10-19 PROCEDURE — 73630 X-RAY EXAM OF FOOT: CPT

## 2019-10-19 PROCEDURE — 6370000000 HC RX 637 (ALT 250 FOR IP): Performed by: EMERGENCY MEDICINE

## 2019-10-19 RX ORDER — CEPHALEXIN 500 MG/1
500 CAPSULE ORAL 4 TIMES DAILY
Qty: 28 CAPSULE | Refills: 0 | Status: SHIPPED | OUTPATIENT
Start: 2019-10-19 | End: 2019-10-26

## 2019-10-19 RX ORDER — CEPHALEXIN 250 MG/1
500 CAPSULE ORAL ONCE
Status: COMPLETED | OUTPATIENT
Start: 2019-10-19 | End: 2019-10-19

## 2019-10-19 RX ADMIN — CEPHALEXIN 500 MG: 250 CAPSULE ORAL at 16:55

## 2019-10-19 ASSESSMENT — PAIN DESCRIPTION - PAIN TYPE: TYPE: ACUTE PAIN

## 2019-10-19 ASSESSMENT — PAIN SCALES - GENERAL: PAINLEVEL_OUTOF10: 6

## 2019-10-22 ENCOUNTER — OFFICE VISIT (OUTPATIENT)
Dept: FAMILY MEDICINE CLINIC | Age: 59
End: 2019-10-22
Payer: COMMERCIAL

## 2019-10-22 VITALS
RESPIRATION RATE: 16 BRPM | WEIGHT: 205.8 LBS | OXYGEN SATURATION: 96 % | TEMPERATURE: 98.1 F | HEART RATE: 94 BPM | DIASTOLIC BLOOD PRESSURE: 76 MMHG | HEIGHT: 67 IN | SYSTOLIC BLOOD PRESSURE: 122 MMHG | BODY MASS INDEX: 32.3 KG/M2

## 2019-10-22 PROCEDURE — 99213 OFFICE O/P EST LOW 20 MIN: CPT | Performed by: NURSE PRACTITIONER

## 2019-10-22 RX ORDER — SULFAMETHOXAZOLE AND TRIMETHOPRIM 800; 160 MG/1; MG/1
1 TABLET ORAL 2 TIMES DAILY
Qty: 14 TABLET | Refills: 0 | Status: SHIPPED | OUTPATIENT
Start: 2019-10-22 | End: 2019-10-29

## 2019-10-22 NOTE — PROGRESS NOTES
LABA1C  Lab Results   Component Value Date    [facility] 5.900 (H) 09/06/2019         ASSESSMENT/PLAN:      1. Cellulitis of left lower extremity  Continue the Keflex until gone. Start the Bactrim. Advised patient if the swelling/erythema worsens, develops a fever, and/or develops red streaks to the area to go to the emergency room. Patient to follow up in 2 days. - sulfamethoxazole-trimethoprim (BACTRIM DS;SEPTRA DS) 800-160 MG per tablet; Take 1 tablet by mouth 2 times daily for 7 days  Dispense: 14 tablet; Refill: 0          There are no discontinued medications. No orders of the defined types were placed in this encounter. Care discussed with patient. Questions answered. Patient verbalizes understanding and agrees with plan. After visit summary provided. Advised to call for any problems, questions, or concerns. Return in about 2 days (around 10/24/2019), or if symptoms worsen or fail to improve.                                               Signed:  SARA Tamayo CNP  01/01/20  7:43 PM

## 2019-10-23 LAB
EKG ATRIAL RATE: 326 BPM
EKG DIAGNOSIS: NORMAL
EKG Q-T INTERVAL: 340 MS
EKG QRS DURATION: 88 MS
EKG QTC CALCULATION (BAZETT): 453 MS
EKG R AXIS: 60 DEGREES
EKG T AXIS: 62 DEGREES
EKG VENTRICULAR RATE: 107 BPM

## 2019-10-24 ENCOUNTER — HOSPITAL ENCOUNTER (OUTPATIENT)
Dept: ULTRASOUND IMAGING | Age: 59
Discharge: HOME | End: 2019-10-24
Payer: COMMERCIAL

## 2019-10-24 ENCOUNTER — OFFICE VISIT (OUTPATIENT)
Dept: FAMILY MEDICINE CLINIC | Age: 59
End: 2019-10-24
Payer: COMMERCIAL

## 2019-10-24 VITALS
HEART RATE: 104 BPM | BODY MASS INDEX: 32.18 KG/M2 | OXYGEN SATURATION: 98 % | HEIGHT: 67 IN | TEMPERATURE: 98.4 F | SYSTOLIC BLOOD PRESSURE: 112 MMHG | RESPIRATION RATE: 16 BRPM | WEIGHT: 205 LBS | DIASTOLIC BLOOD PRESSURE: 74 MMHG

## 2019-10-24 DIAGNOSIS — M79.605 LEFT LEG PAIN: Primary | ICD-10-CM

## 2019-10-24 DIAGNOSIS — M79.605 LEFT LEG PAIN: ICD-10-CM

## 2019-10-24 DIAGNOSIS — L03.116 CELLULITIS OF LEFT LOWER EXTREMITY: ICD-10-CM

## 2019-10-24 PROCEDURE — 99213 OFFICE O/P EST LOW 20 MIN: CPT | Performed by: NURSE PRACTITIONER

## 2019-10-24 PROCEDURE — 93971 EXTREMITY STUDY: CPT

## 2019-10-24 ASSESSMENT — ENCOUNTER SYMPTOMS: RESPIRATORY NEGATIVE: 1

## 2019-10-25 ENCOUNTER — TELEPHONE (OUTPATIENT)
Dept: CARDIOLOGY CLINIC | Age: 59
End: 2019-10-25

## 2019-11-13 ENCOUNTER — INITIAL CONSULT (OUTPATIENT)
Dept: CARDIOLOGY CLINIC | Age: 59
End: 2019-11-13
Payer: COMMERCIAL

## 2019-11-13 VITALS
DIASTOLIC BLOOD PRESSURE: 84 MMHG | RESPIRATION RATE: 14 BRPM | BODY MASS INDEX: 32.49 KG/M2 | OXYGEN SATURATION: 97 % | SYSTOLIC BLOOD PRESSURE: 130 MMHG | HEART RATE: 94 BPM | WEIGHT: 207 LBS | HEIGHT: 67 IN

## 2019-11-13 DIAGNOSIS — I48.11 LONGSTANDING PERSISTENT ATRIAL FIBRILLATION (HCC): ICD-10-CM

## 2019-11-13 PROCEDURE — 99214 OFFICE O/P EST MOD 30 MIN: CPT | Performed by: INTERNAL MEDICINE

## 2019-11-13 PROCEDURE — 93000 ELECTROCARDIOGRAM COMPLETE: CPT | Performed by: INTERNAL MEDICINE

## 2019-11-17 ASSESSMENT — ENCOUNTER SYMPTOMS
BLOOD IN STOOL: 0
COLOR CHANGE: 0
WHEEZING: 0
CONSTIPATION: 0
COUGH: 0
NAUSEA: 0
DIARRHEA: 0
EYE PAIN: 0
PHOTOPHOBIA: 0
CHEST TIGHTNESS: 0
BACK PAIN: 0
SHORTNESS OF BREATH: 1
ABDOMINAL PAIN: 0
VOMITING: 0

## 2019-12-05 ENCOUNTER — TELEPHONE (OUTPATIENT)
Dept: CARDIOLOGY CLINIC | Age: 59
End: 2019-12-05

## 2019-12-06 ENCOUNTER — HOSPITAL ENCOUNTER (OUTPATIENT)
Dept: GENERAL RADIOLOGY | Age: 59
Discharge: HOME | End: 2019-12-06
Payer: COMMERCIAL

## 2019-12-06 ENCOUNTER — HOSPITAL ENCOUNTER (OUTPATIENT)
Age: 59
Discharge: HOME | End: 2019-12-06
Payer: COMMERCIAL

## 2019-12-06 ENCOUNTER — ANESTHESIA EVENT (OUTPATIENT)
Dept: CARDIAC CATH/INVASIVE PROCEDURES | Age: 59
End: 2019-12-06
Payer: COMMERCIAL

## 2019-12-06 ENCOUNTER — TELEPHONE (OUTPATIENT)
Dept: CARDIOLOGY CLINIC | Age: 59
End: 2019-12-06

## 2019-12-06 DIAGNOSIS — Z01.818 PRE-PROCEDURAL EXAMINATION: ICD-10-CM

## 2019-12-06 LAB
ANION GAP SERPL CALCULATED.3IONS-SCNC: 11 MMOL/L (ref 4–16)
APTT: 39.3 SECONDS (ref 25.1–37.1)
BASOPHILS ABSOLUTE: 0 K/CU MM
BASOPHILS RELATIVE PERCENT: 0.3 % (ref 0–1)
BUN BLDV-MCNC: 24 MG/DL (ref 6–23)
CALCIUM SERPL-MCNC: 9.3 MG/DL (ref 8.3–10.6)
CHLORIDE BLD-SCNC: 99 MMOL/L (ref 99–110)
CO2: 29 MMOL/L (ref 21–32)
CREAT SERPL-MCNC: 0.9 MG/DL (ref 0.6–1.1)
DIFFERENTIAL TYPE: ABNORMAL
EOSINOPHILS ABSOLUTE: 0.1 K/CU MM
EOSINOPHILS RELATIVE PERCENT: 2.3 % (ref 0–3)
GFR AFRICAN AMERICAN: >60 ML/MIN/1.73M2
GFR NON-AFRICAN AMERICAN: >60 ML/MIN/1.73M2
GLUCOSE BLD-MCNC: 80 MG/DL (ref 70–99)
HCT VFR BLD CALC: 43.5 % (ref 37–47)
HEMOGLOBIN: 13.6 GM/DL (ref 12.5–16)
IMMATURE NEUTROPHIL %: 0.3 % (ref 0–0.43)
INR BLD: 1.33 INDEX
LYMPHOCYTES ABSOLUTE: 2.1 K/CU MM
LYMPHOCYTES RELATIVE PERCENT: 34.4 % (ref 24–44)
MAGNESIUM: 2.1 MG/DL (ref 1.8–2.4)
MCH RBC QN AUTO: 30.2 PG (ref 27–31)
MCHC RBC AUTO-ENTMCNC: 31.3 % (ref 32–36)
MCV RBC AUTO: 96.5 FL (ref 78–100)
MONOCYTES ABSOLUTE: 0.6 K/CU MM
MONOCYTES RELATIVE PERCENT: 10.5 % (ref 0–4)
NUCLEATED RBC %: 0 %
PDW BLD-RTO: 12.6 % (ref 11.7–14.9)
PHOSPHORUS: 4 MG/DL (ref 2.5–4.9)
PLATELET # BLD: 252 K/CU MM (ref 140–440)
PMV BLD AUTO: 9.7 FL (ref 7.5–11.1)
POTASSIUM SERPL-SCNC: 4.1 MMOL/L (ref 3.5–5.1)
PROTHROMBIN TIME: 16.1 SECONDS (ref 11.7–14.5)
RBC # BLD: 4.51 M/CU MM (ref 4.2–5.4)
SEGMENTED NEUTROPHILS ABSOLUTE COUNT: 3.2 K/CU MM
SEGMENTED NEUTROPHILS RELATIVE PERCENT: 52.2 % (ref 36–66)
SODIUM BLD-SCNC: 139 MMOL/L (ref 135–145)
TOTAL IMMATURE NEUTOROPHIL: 0.02 K/CU MM
TOTAL NUCLEATED RBC: 0 K/CU MM
WBC # BLD: 6.1 K/CU MM (ref 4–10.5)

## 2019-12-06 PROCEDURE — 84100 ASSAY OF PHOSPHORUS: CPT

## 2019-12-06 PROCEDURE — 36415 COLL VENOUS BLD VENIPUNCTURE: CPT

## 2019-12-06 PROCEDURE — 85610 PROTHROMBIN TIME: CPT

## 2019-12-06 PROCEDURE — 83735 ASSAY OF MAGNESIUM: CPT

## 2019-12-06 PROCEDURE — 85025 COMPLETE CBC W/AUTO DIFF WBC: CPT

## 2019-12-06 PROCEDURE — 71046 X-RAY EXAM CHEST 2 VIEWS: CPT

## 2019-12-06 PROCEDURE — 80048 BASIC METABOLIC PNL TOTAL CA: CPT

## 2019-12-06 PROCEDURE — 85730 THROMBOPLASTIN TIME PARTIAL: CPT

## 2019-12-06 ASSESSMENT — ENCOUNTER SYMPTOMS: SHORTNESS OF BREATH: 1

## 2019-12-09 ENCOUNTER — ANESTHESIA (OUTPATIENT)
Dept: CARDIAC CATH/INVASIVE PROCEDURES | Age: 59
End: 2019-12-09
Payer: COMMERCIAL

## 2019-12-09 ENCOUNTER — APPOINTMENT (OUTPATIENT)
Dept: GENERAL RADIOLOGY | Age: 59
End: 2019-12-09
Attending: INTERNAL MEDICINE
Payer: COMMERCIAL

## 2019-12-09 ENCOUNTER — HOSPITAL ENCOUNTER (OUTPATIENT)
Dept: CARDIAC CATH/INVASIVE PROCEDURES | Age: 59
Discharge: HOME | End: 2019-12-10
Attending: INTERNAL MEDICINE | Admitting: INTERNAL MEDICINE
Payer: COMMERCIAL

## 2019-12-09 VITALS
TEMPERATURE: 97.7 F | RESPIRATION RATE: 10 BRPM | DIASTOLIC BLOOD PRESSURE: 77 MMHG | SYSTOLIC BLOOD PRESSURE: 110 MMHG | OXYGEN SATURATION: 94 %

## 2019-12-09 PROBLEM — Z86.79 S/P ABLATION OF ATRIAL FIBRILLATION: Status: ACTIVE | Noted: 2019-12-09

## 2019-12-09 PROBLEM — Z98.890 S/P ABLATION OF ATRIAL FIBRILLATION: Status: ACTIVE | Noted: 2019-12-09

## 2019-12-09 LAB
ABO/RH: NORMAL
ACTIVATED CLOTTING TIME, LOW RANGE: 158 SEC
ACTIVATED CLOTTING TIME, LOW RANGE: 175 SEC
ACTIVATED CLOTTING TIME, LOW RANGE: 265 SEC
ACTIVATED CLOTTING TIME, LOW RANGE: 315 SEC
ACTIVATED CLOTTING TIME, LOW RANGE: 369 SEC
ACTIVATED CLOTTING TIME, LOW RANGE: 380 SEC
ACTIVATED CLOTTING TIME, LOW RANGE: >400 SEC
ANTIBODY SCREEN: NEGATIVE
BASE EXCESS MIXED: 1.2 (ref 0–2.3)
BASE EXCESS: ABNORMAL (ref 0–2.4)
CO2: 24 MMOL/L (ref 21–32)
GLUCOSE BLD-MCNC: 103 MG/DL (ref 70–99)
HCO3 ARTERIAL: 23.2 MMOL/L (ref 18–23)
HCT VFR BLD CALC: 39 % (ref 37–47)
HEMOGLOBIN: 13.2 GM/DL (ref 12.5–16)
O2 SATURATION: 99.7 % (ref 96–97)
PCO2 ARTERIAL: 37 MMHG (ref 32–45)
PH BLOOD: 7.41 (ref 7.34–7.45)
PO2 ARTERIAL: 205.3 MMHG (ref 75–100)
POC CALCIUM: 1.08 MMOL/L (ref 1.12–1.32)
POC CHLORIDE: 106 MMOL/L (ref 98–109)
POC CREATININE: 0.7 MG/DL (ref 0.6–1.1)
POTASSIUM SERPL-SCNC: 4.4 MMOL/L (ref 3.5–4.5)
SODIUM BLD-SCNC: 141 MMOL/L (ref 138–146)
SOURCE, BLOOD GAS: ABNORMAL

## 2019-12-09 PROCEDURE — 2580000003 HC RX 258

## 2019-12-09 PROCEDURE — 85347 COAGULATION TIME ACTIVATED: CPT

## 2019-12-09 PROCEDURE — 6360000002 HC RX W HCPCS: Performed by: ANESTHESIOLOGY

## 2019-12-09 PROCEDURE — 2500000003 HC RX 250 WO HCPCS: Performed by: ANESTHESIOLOGY

## 2019-12-09 PROCEDURE — 86901 BLOOD TYPING SEROLOGIC RH(D): CPT

## 2019-12-09 PROCEDURE — 93656 COMPRE EP EVAL ABLTJ ATR FIB: CPT | Performed by: INTERNAL MEDICINE

## 2019-12-09 PROCEDURE — 93657 TX L/R ATRIAL FIB ADDL: CPT | Performed by: INTERNAL MEDICINE

## 2019-12-09 PROCEDURE — 2709999900 HC NON-CHARGEABLE SUPPLY

## 2019-12-09 PROCEDURE — 3700000001 HC ADD 15 MINUTES (ANESTHESIA)

## 2019-12-09 PROCEDURE — 7100000000 HC PACU RECOVERY - FIRST 15 MIN

## 2019-12-09 PROCEDURE — 6370000000 HC RX 637 (ALT 250 FOR IP): Performed by: INTERNAL MEDICINE

## 2019-12-09 PROCEDURE — 2720000010 HC SURG SUPPLY STERILE

## 2019-12-09 PROCEDURE — 93623 PRGRMD STIMJ&PACG IV RX NFS: CPT | Performed by: INTERNAL MEDICINE

## 2019-12-09 PROCEDURE — 93662 INTRACARDIAC ECG (ICE): CPT | Performed by: INTERNAL MEDICINE

## 2019-12-09 PROCEDURE — 93657 TX L/R ATRIAL FIB ADDL: CPT

## 2019-12-09 PROCEDURE — C1733 CATH, EP, OTHR THAN COOL-TIP: HCPCS

## 2019-12-09 PROCEDURE — 93320 DOPPLER ECHO COMPLETE: CPT | Performed by: INTERNAL MEDICINE

## 2019-12-09 PROCEDURE — 93613 INTRACARDIAC EPHYS 3D MAPG: CPT | Performed by: INTERNAL MEDICINE

## 2019-12-09 PROCEDURE — 93656 COMPRE EP EVAL ABLTJ ATR FIB: CPT

## 2019-12-09 PROCEDURE — 93325 DOPPLER ECHO COLOR FLOW MAPG: CPT | Performed by: INTERNAL MEDICINE

## 2019-12-09 PROCEDURE — 86900 BLOOD TYPING SEROLOGIC ABO: CPT

## 2019-12-09 PROCEDURE — C1732 CATH, EP, DIAG/ABL, 3D/VECT: HCPCS

## 2019-12-09 PROCEDURE — 7100000001 HC PACU RECOVERY - ADDTL 15 MIN

## 2019-12-09 PROCEDURE — C1894 INTRO/SHEATH, NON-LASER: HCPCS

## 2019-12-09 PROCEDURE — 86850 RBC ANTIBODY SCREEN: CPT

## 2019-12-09 PROCEDURE — 93312 ECHO TRANSESOPHAGEAL: CPT

## 2019-12-09 PROCEDURE — 71045 X-RAY EXAM CHEST 1 VIEW: CPT

## 2019-12-09 PROCEDURE — 3700000000 HC ANESTHESIA ATTENDED CARE

## 2019-12-09 PROCEDURE — 2580000003 HC RX 258: Performed by: ANESTHESIOLOGY

## 2019-12-09 PROCEDURE — 2580000003 HC RX 258: Performed by: INTERNAL MEDICINE

## 2019-12-09 PROCEDURE — 93613 INTRACARDIAC EPHYS 3D MAPG: CPT

## 2019-12-09 PROCEDURE — 6360000002 HC RX W HCPCS: Performed by: INTERNAL MEDICINE

## 2019-12-09 PROCEDURE — C1730 CATH, EP, 19 OR FEW ELECT: HCPCS

## 2019-12-09 PROCEDURE — C1753 CATH, INTRAVAS ULTRASOUND: HCPCS

## 2019-12-09 PROCEDURE — 93312 ECHO TRANSESOPHAGEAL: CPT | Performed by: INTERNAL MEDICINE

## 2019-12-09 PROCEDURE — 93662 INTRACARDIAC ECG (ICE): CPT

## 2019-12-09 PROCEDURE — 93010 ELECTROCARDIOGRAM REPORT: CPT | Performed by: INTERNAL MEDICINE

## 2019-12-09 PROCEDURE — 2500000003 HC RX 250 WO HCPCS

## 2019-12-09 PROCEDURE — 93308 TTE F-UP OR LMTD: CPT

## 2019-12-09 PROCEDURE — 93005 ELECTROCARDIOGRAM TRACING: CPT | Performed by: INTERNAL MEDICINE

## 2019-12-09 PROCEDURE — 6360000002 HC RX W HCPCS

## 2019-12-09 RX ORDER — FENTANYL CITRATE 50 UG/ML
INJECTION, SOLUTION INTRAMUSCULAR; INTRAVENOUS PRN
Status: DISCONTINUED | OUTPATIENT
Start: 2019-12-09 | End: 2019-12-09 | Stop reason: SDUPTHER

## 2019-12-09 RX ORDER — AMIODARONE HYDROCHLORIDE 200 MG/1
200 TABLET ORAL DAILY
Status: DISCONTINUED | OUTPATIENT
Start: 2019-12-09 | End: 2019-12-10 | Stop reason: HOSPADM

## 2019-12-09 RX ORDER — MAGNESIUM SULFATE IN WATER 40 MG/ML
2 INJECTION, SOLUTION INTRAVENOUS PRN
Status: DISCONTINUED | OUTPATIENT
Start: 2019-12-09 | End: 2019-12-10 | Stop reason: HOSPADM

## 2019-12-09 RX ORDER — HYDRALAZINE HYDROCHLORIDE 20 MG/ML
5 INJECTION INTRAMUSCULAR; INTRAVENOUS EVERY 10 MIN PRN
Status: DISCONTINUED | OUTPATIENT
Start: 2019-12-09 | End: 2019-12-10 | Stop reason: HOSPADM

## 2019-12-09 RX ORDER — PROTAMINE SULFATE 10 MG/ML
INJECTION, SOLUTION INTRAVENOUS PRN
Status: DISCONTINUED | OUTPATIENT
Start: 2019-12-09 | End: 2019-12-09 | Stop reason: SDUPTHER

## 2019-12-09 RX ORDER — ONDANSETRON 2 MG/ML
INJECTION INTRAMUSCULAR; INTRAVENOUS PRN
Status: DISCONTINUED | OUTPATIENT
Start: 2019-12-09 | End: 2019-12-09 | Stop reason: SDUPTHER

## 2019-12-09 RX ORDER — DEXAMETHASONE SODIUM PHOSPHATE 4 MG/ML
INJECTION, SOLUTION INTRA-ARTICULAR; INTRALESIONAL; INTRAMUSCULAR; INTRAVENOUS; SOFT TISSUE PRN
Status: DISCONTINUED | OUTPATIENT
Start: 2019-12-09 | End: 2019-12-09 | Stop reason: SDUPTHER

## 2019-12-09 RX ORDER — LIDOCAINE HYDROCHLORIDE 20 MG/ML
INJECTION, SOLUTION INFILTRATION; PERINEURAL PRN
Status: DISCONTINUED | OUTPATIENT
Start: 2019-12-09 | End: 2019-12-09 | Stop reason: SDUPTHER

## 2019-12-09 RX ORDER — VECURONIUM BROMIDE 1 MG/ML
INJECTION, POWDER, LYOPHILIZED, FOR SOLUTION INTRAVENOUS PRN
Status: DISCONTINUED | OUTPATIENT
Start: 2019-12-09 | End: 2019-12-09 | Stop reason: SDUPTHER

## 2019-12-09 RX ORDER — FENTANYL CITRATE 50 UG/ML
25 INJECTION, SOLUTION INTRAMUSCULAR; INTRAVENOUS EVERY 5 MIN PRN
Status: DISCONTINUED | OUTPATIENT
Start: 2019-12-09 | End: 2019-12-10 | Stop reason: HOSPADM

## 2019-12-09 RX ORDER — SODIUM CHLORIDE 0.9 % (FLUSH) 0.9 %
10 SYRINGE (ML) INJECTION PRN
Status: DISCONTINUED | OUTPATIENT
Start: 2019-12-09 | End: 2019-12-10 | Stop reason: HOSPADM

## 2019-12-09 RX ORDER — PANTOPRAZOLE SODIUM 40 MG/1
40 TABLET, DELAYED RELEASE ORAL
Status: DISCONTINUED | OUTPATIENT
Start: 2019-12-09 | End: 2019-12-10 | Stop reason: HOSPADM

## 2019-12-09 RX ORDER — SODIUM CHLORIDE 0.9 % (FLUSH) 0.9 %
10 SYRINGE (ML) INJECTION EVERY 12 HOURS SCHEDULED
Status: DISCONTINUED | OUTPATIENT
Start: 2019-12-09 | End: 2019-12-10 | Stop reason: HOSPADM

## 2019-12-09 RX ORDER — ROCURONIUM BROMIDE 10 MG/ML
INJECTION, SOLUTION INTRAVENOUS PRN
Status: DISCONTINUED | OUTPATIENT
Start: 2019-12-09 | End: 2019-12-09 | Stop reason: SDUPTHER

## 2019-12-09 RX ORDER — ASPIRIN 81 MG/1
81 TABLET ORAL DAILY
Status: DISCONTINUED | OUTPATIENT
Start: 2019-12-09 | End: 2019-12-10 | Stop reason: HOSPADM

## 2019-12-09 RX ORDER — ACETAMINOPHEN 325 MG/1
650 TABLET ORAL EVERY 6 HOURS PRN
Status: DISCONTINUED | OUTPATIENT
Start: 2019-12-09 | End: 2019-12-10 | Stop reason: HOSPADM

## 2019-12-09 RX ORDER — PROPOFOL 10 MG/ML
INJECTION, EMULSION INTRAVENOUS PRN
Status: DISCONTINUED | OUTPATIENT
Start: 2019-12-09 | End: 2019-12-09 | Stop reason: SDUPTHER

## 2019-12-09 RX ORDER — ONDANSETRON 2 MG/ML
4 INJECTION INTRAMUSCULAR; INTRAVENOUS
Status: COMPLETED | OUTPATIENT
Start: 2019-12-09 | End: 2019-12-09

## 2019-12-09 RX ORDER — SODIUM CHLORIDE 9 MG/ML
INJECTION, SOLUTION INTRAVENOUS CONTINUOUS PRN
Status: DISCONTINUED | OUTPATIENT
Start: 2019-12-09 | End: 2019-12-09 | Stop reason: SDUPTHER

## 2019-12-09 RX ADMIN — DEXAMETHASONE SODIUM PHOSPHATE 8 MG: 4 INJECTION, SOLUTION INTRAMUSCULAR; INTRAVENOUS at 08:24

## 2019-12-09 RX ADMIN — AMIODARONE HYDROCHLORIDE 200 MG: 200 TABLET ORAL at 14:21

## 2019-12-09 RX ADMIN — PHENYLEPHRINE HYDROCHLORIDE 100 MCG: 10 INJECTION INTRAVENOUS at 09:21

## 2019-12-09 RX ADMIN — ONDANSETRON 4 MG: 2 INJECTION INTRAMUSCULAR; INTRAVENOUS at 11:13

## 2019-12-09 RX ADMIN — LIDOCAINE HYDROCHLORIDE 100 MG: 20 INJECTION, SOLUTION INFILTRATION; PERINEURAL at 08:17

## 2019-12-09 RX ADMIN — FENTANYL CITRATE 50 MCG: 50 INJECTION INTRAMUSCULAR; INTRAVENOUS at 08:17

## 2019-12-09 RX ADMIN — PHENYLEPHRINE HYDROCHLORIDE 200 MCG: 10 INJECTION INTRAVENOUS at 08:41

## 2019-12-09 RX ADMIN — METOPROLOL TARTRATE 25 MG: 25 TABLET ORAL at 14:21

## 2019-12-09 RX ADMIN — VECURONIUM BROMIDE FOR INJECTION 3 MG: 1 INJECTION, POWDER, LYOPHILIZED, FOR SOLUTION INTRAVENOUS at 10:19

## 2019-12-09 RX ADMIN — SUGAMMADEX 100 MG: 100 INJECTION, SOLUTION INTRAVENOUS at 11:19

## 2019-12-09 RX ADMIN — ACETAMINOPHEN 650 MG: 325 TABLET ORAL at 14:24

## 2019-12-09 RX ADMIN — ROCURONIUM BROMIDE 50 MG: 10 INJECTION INTRAVENOUS at 08:19

## 2019-12-09 RX ADMIN — METOPROLOL TARTRATE 25 MG: 25 TABLET ORAL at 22:14

## 2019-12-09 RX ADMIN — PANTOPRAZOLE SODIUM 40 MG: 40 TABLET, DELAYED RELEASE ORAL at 17:54

## 2019-12-09 RX ADMIN — PHENYLEPHRINE HYDROCHLORIDE 100 MCG: 10 INJECTION INTRAVENOUS at 08:53

## 2019-12-09 RX ADMIN — PHENYLEPHRINE HYDROCHLORIDE 100 MCG: 10 INJECTION INTRAVENOUS at 10:49

## 2019-12-09 RX ADMIN — PROPOFOL 120 MG: 10 INJECTION, EMULSION INTRAVENOUS at 08:17

## 2019-12-09 RX ADMIN — SODIUM CHLORIDE: 9 INJECTION, SOLUTION INTRAVENOUS at 08:10

## 2019-12-09 RX ADMIN — PHENYLEPHRINE HYDROCHLORIDE 100 MCG/MIN: 10 INJECTION INTRAVENOUS at 08:56

## 2019-12-09 RX ADMIN — PHENYLEPHRINE HYDROCHLORIDE 100 MCG: 10 INJECTION INTRAVENOUS at 11:01

## 2019-12-09 RX ADMIN — PHENYLEPHRINE HYDROCHLORIDE 200 MCG: 10 INJECTION INTRAVENOUS at 08:47

## 2019-12-09 RX ADMIN — ASPIRIN 81 MG: 81 TABLET, COATED ORAL at 14:21

## 2019-12-09 RX ADMIN — ENOXAPARIN SODIUM 70 MG: 80 INJECTION SUBCUTANEOUS at 17:55

## 2019-12-09 RX ADMIN — Medication 10 ML: at 22:14

## 2019-12-09 RX ADMIN — PROTAMINE SULFATE 30 MG: 10 INJECTION, SOLUTION INTRAVENOUS at 11:13

## 2019-12-09 RX ADMIN — PHENYLEPHRINE HYDROCHLORIDE 200 MCG: 10 INJECTION INTRAVENOUS at 08:35

## 2019-12-09 RX ADMIN — VECURONIUM BROMIDE FOR INJECTION 3 MG: 1 INJECTION, POWDER, LYOPHILIZED, FOR SOLUTION INTRAVENOUS at 09:17

## 2019-12-09 RX ADMIN — PROPOFOL 20 MG: 10 INJECTION, EMULSION INTRAVENOUS at 08:19

## 2019-12-09 RX ADMIN — ONDANSETRON 4 MG: 2 INJECTION INTRAMUSCULAR; INTRAVENOUS at 12:13

## 2019-12-09 RX ADMIN — PHENYLEPHRINE HYDROCHLORIDE 200 MCG: 10 INJECTION INTRAVENOUS at 08:57

## 2019-12-09 ASSESSMENT — PULMONARY FUNCTION TESTS
PIF_VALUE: 28
PIF_VALUE: 26
PIF_VALUE: 26
PIF_VALUE: 25
PIF_VALUE: 26
PIF_VALUE: 1
PIF_VALUE: 26
PIF_VALUE: 25
PIF_VALUE: 24
PIF_VALUE: 26
PIF_VALUE: 26
PIF_VALUE: 24
PIF_VALUE: 26
PIF_VALUE: 25
PIF_VALUE: 25
PIF_VALUE: 26
PIF_VALUE: 25
PIF_VALUE: 26
PIF_VALUE: 25
PIF_VALUE: 2
PIF_VALUE: 0
PIF_VALUE: 1
PIF_VALUE: 26
PIF_VALUE: 24
PIF_VALUE: 26
PIF_VALUE: 6
PIF_VALUE: 27
PIF_VALUE: 26
PIF_VALUE: 25
PIF_VALUE: 26
PIF_VALUE: 26
PIF_VALUE: 24
PIF_VALUE: 26
PIF_VALUE: 26
PIF_VALUE: 24
PIF_VALUE: 26
PIF_VALUE: 24
PIF_VALUE: 26
PIF_VALUE: 25
PIF_VALUE: 25
PIF_VALUE: 0
PIF_VALUE: 26
PIF_VALUE: 0
PIF_VALUE: 25
PIF_VALUE: 26
PIF_VALUE: 24
PIF_VALUE: 26
PIF_VALUE: 25
PIF_VALUE: 26
PIF_VALUE: 25
PIF_VALUE: 5
PIF_VALUE: 26
PIF_VALUE: 25
PIF_VALUE: 24
PIF_VALUE: 26
PIF_VALUE: 25
PIF_VALUE: 24
PIF_VALUE: 25
PIF_VALUE: 26
PIF_VALUE: 26
PIF_VALUE: 0
PIF_VALUE: 26
PIF_VALUE: 25
PIF_VALUE: 25
PIF_VALUE: 26
PIF_VALUE: 24
PIF_VALUE: 25
PIF_VALUE: 26
PIF_VALUE: 25
PIF_VALUE: 26
PIF_VALUE: 25
PIF_VALUE: 23
PIF_VALUE: 26
PIF_VALUE: 25
PIF_VALUE: 0
PIF_VALUE: 26
PIF_VALUE: 23
PIF_VALUE: 7
PIF_VALUE: 26
PIF_VALUE: 26
PIF_VALUE: 25
PIF_VALUE: 25
PIF_VALUE: 1
PIF_VALUE: 26
PIF_VALUE: 15
PIF_VALUE: 26
PIF_VALUE: 24
PIF_VALUE: 22
PIF_VALUE: 26
PIF_VALUE: 26
PIF_VALUE: 25
PIF_VALUE: 24
PIF_VALUE: 25
PIF_VALUE: 26
PIF_VALUE: 25
PIF_VALUE: 24
PIF_VALUE: 25
PIF_VALUE: 22
PIF_VALUE: 25
PIF_VALUE: 25
PIF_VALUE: 26
PIF_VALUE: 27
PIF_VALUE: 26
PIF_VALUE: 25
PIF_VALUE: 1
PIF_VALUE: 0
PIF_VALUE: 24
PIF_VALUE: 26
PIF_VALUE: 25
PIF_VALUE: 25
PIF_VALUE: 24
PIF_VALUE: 25
PIF_VALUE: 1
PIF_VALUE: 26
PIF_VALUE: 25
PIF_VALUE: 25
PIF_VALUE: 26
PIF_VALUE: 0
PIF_VALUE: 26
PIF_VALUE: 25
PIF_VALUE: 0
PIF_VALUE: 26
PIF_VALUE: 25
PIF_VALUE: 1
PIF_VALUE: 25
PIF_VALUE: 26
PIF_VALUE: 25
PIF_VALUE: 1
PIF_VALUE: 26
PIF_VALUE: 26
PIF_VALUE: 25
PIF_VALUE: 26
PIF_VALUE: 26
PIF_VALUE: 25
PIF_VALUE: 1
PIF_VALUE: 25
PIF_VALUE: 26
PIF_VALUE: 25
PIF_VALUE: 26
PIF_VALUE: 26
PIF_VALUE: 25
PIF_VALUE: 24
PIF_VALUE: 26
PIF_VALUE: 24
PIF_VALUE: 24
PIF_VALUE: 2
PIF_VALUE: 25
PIF_VALUE: 26
PIF_VALUE: 26
PIF_VALUE: 29
PIF_VALUE: 1
PIF_VALUE: 26
PIF_VALUE: 25
PIF_VALUE: 24
PIF_VALUE: 25
PIF_VALUE: 26
PIF_VALUE: 1
PIF_VALUE: 25
PIF_VALUE: 25
PIF_VALUE: 26
PIF_VALUE: 1
PIF_VALUE: 25
PIF_VALUE: 26
PIF_VALUE: 25
PIF_VALUE: 1
PIF_VALUE: 23
PIF_VALUE: 25
PIF_VALUE: 26
PIF_VALUE: 25

## 2019-12-09 ASSESSMENT — PAIN SCALES - GENERAL
PAINLEVEL_OUTOF10: 7
PAINLEVEL_OUTOF10: 0

## 2019-12-10 VITALS
DIASTOLIC BLOOD PRESSURE: 70 MMHG | WEIGHT: 207 LBS | HEART RATE: 69 BPM | OXYGEN SATURATION: 100 % | BODY MASS INDEX: 32.49 KG/M2 | TEMPERATURE: 98.3 F | HEIGHT: 67 IN | RESPIRATION RATE: 19 BRPM | SYSTOLIC BLOOD PRESSURE: 114 MMHG

## 2019-12-10 LAB
ALBUMIN SERPL-MCNC: 4.1 GM/DL (ref 3.4–5)
ANION GAP SERPL CALCULATED.3IONS-SCNC: 11 MMOL/L (ref 4–16)
BUN BLDV-MCNC: 17 MG/DL (ref 6–23)
CALCIUM SERPL-MCNC: 9.1 MG/DL (ref 8.3–10.6)
CHLORIDE BLD-SCNC: 102 MMOL/L (ref 99–110)
CO2: 26 MMOL/L (ref 21–32)
CREAT SERPL-MCNC: 0.7 MG/DL (ref 0.6–1.1)
GFR AFRICAN AMERICAN: >60 ML/MIN/1.73M2
GFR NON-AFRICAN AMERICAN: >60 ML/MIN/1.73M2
GLUCOSE BLD-MCNC: 154 MG/DL (ref 70–99)
HCT VFR BLD CALC: 43.5 % (ref 37–47)
HEMOGLOBIN: 13.3 GM/DL (ref 12.5–16)
MAGNESIUM: 2.1 MG/DL (ref 1.8–2.4)
MCH RBC QN AUTO: 29.8 PG (ref 27–31)
MCHC RBC AUTO-ENTMCNC: 30.6 % (ref 32–36)
MCV RBC AUTO: 97.5 FL (ref 78–100)
PDW BLD-RTO: 13 % (ref 11.7–14.9)
PHOSPHORUS: 2.7 MG/DL (ref 2.5–4.9)
PLATELET # BLD: 224 K/CU MM (ref 140–440)
PMV BLD AUTO: 9.8 FL (ref 7.5–11.1)
POTASSIUM SERPL-SCNC: 4.4 MMOL/L (ref 3.5–5.1)
RBC # BLD: 4.46 M/CU MM (ref 4.2–5.4)
SODIUM BLD-SCNC: 139 MMOL/L (ref 135–145)
WBC # BLD: 9 K/CU MM (ref 4–10.5)

## 2019-12-10 PROCEDURE — 83735 ASSAY OF MAGNESIUM: CPT

## 2019-12-10 PROCEDURE — 85027 COMPLETE CBC AUTOMATED: CPT

## 2019-12-10 PROCEDURE — 99217 PR OBSERVATION CARE DISCHARGE MANAGEMENT: CPT | Performed by: NURSE PRACTITIONER

## 2019-12-10 PROCEDURE — 6360000002 HC RX W HCPCS: Performed by: INTERNAL MEDICINE

## 2019-12-10 PROCEDURE — 90686 IIV4 VACC NO PRSV 0.5 ML IM: CPT | Performed by: INTERNAL MEDICINE

## 2019-12-10 PROCEDURE — 80069 RENAL FUNCTION PANEL: CPT

## 2019-12-10 PROCEDURE — 36415 COLL VENOUS BLD VENIPUNCTURE: CPT

## 2019-12-10 PROCEDURE — G0008 ADMIN INFLUENZA VIRUS VAC: HCPCS | Performed by: INTERNAL MEDICINE

## 2019-12-10 PROCEDURE — 6370000000 HC RX 637 (ALT 250 FOR IP): Performed by: INTERNAL MEDICINE

## 2019-12-10 PROCEDURE — 2580000003 HC RX 258: Performed by: INTERNAL MEDICINE

## 2019-12-10 PROCEDURE — 84100 ASSAY OF PHOSPHORUS: CPT

## 2019-12-10 RX ORDER — PANTOPRAZOLE SODIUM 40 MG/1
40 TABLET, DELAYED RELEASE ORAL DAILY
Qty: 30 TABLET | Refills: 0 | Status: SHIPPED | OUTPATIENT
Start: 2019-12-10 | End: 2020-01-22

## 2019-12-10 RX ADMIN — Medication 10 ML: at 09:24

## 2019-12-10 RX ADMIN — ASPIRIN 81 MG: 81 TABLET, COATED ORAL at 09:24

## 2019-12-10 RX ADMIN — INFLUENZA A VIRUS A/BRISBANE/02/2018 IVR-190 (H1N1) ANTIGEN (PROPIOLACTONE INACTIVATED), INFLUENZA A VIRUS A/KANSAS/14/2017 X-327 (H3N2) ANTIGEN (PROPIOLACTONE INACTIVATED), INFLUENZA B VIRUS B/MARYLAND/15/2016 ANTIGEN (PROPIOLACTONE INACTIVATED), INFLUENZA B VIRUS B/PHUKET/3073/2013 BVR-1B ANTIGEN (PROPIOLACTONE INACTIVATED) 0.5 ML: 15; 15; 15; 15 INJECTION, SUSPENSION INTRAMUSCULAR at 09:23

## 2019-12-10 RX ADMIN — AMIODARONE HYDROCHLORIDE 200 MG: 200 TABLET ORAL at 09:24

## 2019-12-10 RX ADMIN — PANTOPRAZOLE SODIUM 40 MG: 40 TABLET, DELAYED RELEASE ORAL at 06:09

## 2019-12-10 RX ADMIN — METOPROLOL TARTRATE 25 MG: 25 TABLET ORAL at 09:24

## 2019-12-10 RX ADMIN — RIVAROXABAN 20 MG: 20 TABLET, FILM COATED ORAL at 09:24

## 2019-12-10 ASSESSMENT — PAIN SCALES - GENERAL: PAINLEVEL_OUTOF10: 0

## 2019-12-11 LAB
EKG ATRIAL RATE: 77 BPM
EKG DIAGNOSIS: NORMAL
EKG P AXIS: 44 DEGREES
EKG P-R INTERVAL: 156 MS
EKG Q-T INTERVAL: 432 MS
EKG QRS DURATION: 86 MS
EKG QTC CALCULATION (BAZETT): 488 MS
EKG R AXIS: 72 DEGREES
EKG T AXIS: 65 DEGREES
EKG VENTRICULAR RATE: 77 BPM

## 2019-12-13 ENCOUNTER — OFFICE VISIT (OUTPATIENT)
Dept: CARDIOLOGY CLINIC | Age: 59
End: 2019-12-13
Payer: COMMERCIAL

## 2019-12-13 VITALS
DIASTOLIC BLOOD PRESSURE: 76 MMHG | HEART RATE: 56 BPM | RESPIRATION RATE: 18 BRPM | WEIGHT: 214.6 LBS | BODY MASS INDEX: 33.61 KG/M2 | SYSTOLIC BLOOD PRESSURE: 124 MMHG

## 2019-12-13 DIAGNOSIS — Z98.890 S/P ABLATION OF ATRIAL FIBRILLATION: Primary | ICD-10-CM

## 2019-12-13 DIAGNOSIS — Z86.79 S/P ABLATION OF ATRIAL FIBRILLATION: Primary | ICD-10-CM

## 2019-12-13 DIAGNOSIS — I48.91 ATRIAL FIBRILLATION, UNSPECIFIED TYPE (HCC): ICD-10-CM

## 2019-12-13 PROCEDURE — 99212 OFFICE O/P EST SF 10 MIN: CPT | Performed by: NURSE PRACTITIONER

## 2019-12-13 PROCEDURE — 93000 ELECTROCARDIOGRAM COMPLETE: CPT | Performed by: INTERNAL MEDICINE

## 2019-12-13 ASSESSMENT — ENCOUNTER SYMPTOMS
COUGH: 0
VOMITING: 0
DIARRHEA: 0
NAUSEA: 0
EYE PAIN: 0
SHORTNESS OF BREATH: 0
CHEST TIGHTNESS: 0
CONSTIPATION: 0
BLOOD IN STOOL: 0
WHEEZING: 0
PHOTOPHOBIA: 0
COLOR CHANGE: 0
BACK PAIN: 0
ABDOMINAL PAIN: 0

## 2019-12-30 ENCOUNTER — OFFICE VISIT (OUTPATIENT)
Dept: FAMILY MEDICINE CLINIC | Age: 59
End: 2019-12-30
Payer: COMMERCIAL

## 2019-12-30 VITALS
HEART RATE: 59 BPM | OXYGEN SATURATION: 94 % | WEIGHT: 208.4 LBS | BODY MASS INDEX: 32.64 KG/M2 | DIASTOLIC BLOOD PRESSURE: 70 MMHG | SYSTOLIC BLOOD PRESSURE: 122 MMHG

## 2019-12-30 DIAGNOSIS — Z98.890 S/P ABLATION OF ATRIAL FIBRILLATION: Primary | ICD-10-CM

## 2019-12-30 DIAGNOSIS — Z86.79 S/P ABLATION OF ATRIAL FIBRILLATION: Primary | ICD-10-CM

## 2019-12-30 DIAGNOSIS — E78.2 MIXED HYPERLIPIDEMIA: ICD-10-CM

## 2019-12-30 DIAGNOSIS — G47.33 OBSTRUCTIVE SLEEP APNEA SYNDROME: ICD-10-CM

## 2019-12-30 DIAGNOSIS — L72.0 WEN: ICD-10-CM

## 2019-12-30 PROCEDURE — 99214 OFFICE O/P EST MOD 30 MIN: CPT | Performed by: FAMILY MEDICINE

## 2020-01-01 ASSESSMENT — ENCOUNTER SYMPTOMS
NAUSEA: 0
DIARRHEA: 0
VOMITING: 0
RESPIRATORY NEGATIVE: 1

## 2020-01-22 ENCOUNTER — OFFICE VISIT (OUTPATIENT)
Dept: CARDIOLOGY CLINIC | Age: 60
End: 2020-01-22
Payer: COMMERCIAL

## 2020-01-22 VITALS
WEIGHT: 210.4 LBS | HEART RATE: 60 BPM | BODY MASS INDEX: 33.02 KG/M2 | SYSTOLIC BLOOD PRESSURE: 160 MMHG | HEIGHT: 67 IN | DIASTOLIC BLOOD PRESSURE: 80 MMHG

## 2020-01-22 PROCEDURE — 99212 OFFICE O/P EST SF 10 MIN: CPT | Performed by: NURSE PRACTITIONER

## 2020-01-22 PROCEDURE — 93000 ELECTROCARDIOGRAM COMPLETE: CPT | Performed by: NURSE PRACTITIONER

## 2020-01-22 ASSESSMENT — ENCOUNTER SYMPTOMS
CONSTIPATION: 0
EYE PAIN: 0
ABDOMINAL PAIN: 0
SHORTNESS OF BREATH: 0
BACK PAIN: 0
WHEEZING: 0
CHEST TIGHTNESS: 0
COLOR CHANGE: 0
NAUSEA: 0
PHOTOPHOBIA: 0
DIARRHEA: 0
BLOOD IN STOOL: 0
COUGH: 0
VOMITING: 0

## 2020-01-22 NOTE — PROGRESS NOTES
Electrophysiology Note      Reason for consultation:  ATRIAL FIBRILLATION    Chief complaint: 1 month follow up s/p ablation    Referring physician: Mao Moses      Primary care physician: Courtney Lennon MD      History of Present Illness: This visit 1/22/2020  Patient is here today for 1 month follow up s/p ablation of atrial fibrillation. Patient reports that she continues to feel well. She is exercising regularly. 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 She denies chest pain,  palpitations, shortness of breath, edema, dizziness, or syncope. Previous visit 12/13/2019  Patient is here for 1 week follow up s/p ablation of atrial fibrillation. She reports she is feeling well. She denies chest pain, palpitations, shortness of breath, edema, dizziness, or syncope. States her groin sites have healed well. Previous visit  Chief Complaint   Patient presents with    Atrial Fibrillation     Consult for AFIB. - referred by DR. Jackson for management. Patient is noted to have atrial fibrillation for some years she thinks. Patient stopped taking medications - she was on cardizem before and now changed to metoprolol. Patient does have tiredness since last 2 years and her endurance has decreased. Shortness of breath with moderate exertion but not at rest. More noticeable in last few months. pt denies any chest pain,dizziness,swelling to ankles or palpitations. Pastmedical history:   Past Medical History:   Diagnosis Date    Atrial fibrillation ([address]     H/O cardiac catheterization 08/15/2019     normal coronaries, normal LVEF    History of cardioversion 08/15/2019    Patient is successfully cardioverted into NSR.  History of cardioversion 08/27/2019    Impression: Patient is successfully cardioverted into NSR.  History of transesophageal echocardiography (ALAN) 08/15/2019    There is no evidence of thrombus in the Left Atrial Appendage. Negative Buble Study, Mod MR, Mild TR    History of transesophageal echocardiography (ALAN) 2019     Sclerotic aortic valve. Severe TR, Mod MR.    Hyperlipidemia        Surgical history :   Past Surgical History:   Procedure Laterality Date    APPENDECTOMY      ATRIAL ABLATION SURGERY Left 2019    Atrial fib ablation (PVI & post wall)     SECTION      CHOLECYSTECTOMY      HYSTERECTOMY         Family history:   Family History   Problem Relation Age of Onset    High Cholesterol Mother     Heart Surgery Mother     Hypertension Father        Social history :  reports that she has never smoked. She has never used smokeless tobacco. She reports that she does not drink alcohol or use drugs. Allergies   Allergen Reactions    Codeine      Nausea and vomiting        Current Outpatient Medications on File Prior to Visit   Medication Sig Dispense Refill    aspirin 81 MG tablet Take 81 mg by mouth daily      metoprolol tartrate (LOPRESSOR) 25 MG tablet Take 1 tablet by mouth 2 times daily 180 tablet 3    amiodarone (CORDARONE) 200 MG tablet 200 mg bid for 1 week and then 200 mg daily (Patient taking differently: Take 200 mg by mouth daily 200 mg daily) 30 tablet 3    rivaroxaban (XARELTO) 20 MG TABS tablet Take 1 tablet by mouth daily (with breakfast) 90 tablet 1    CPAP Machine MISC by Does not apply route every evening       No current facility-administered medications on file prior to visit. Review of Systems:   Review of Systems   Constitutional: Negative for activity change, chills, fatigue and fever. HENT: Negative for congestion, ear pain and tinnitus. Eyes: Negative for photophobia, pain and visual disturbance. Respiratory: Negative for cough, chest tightness, shortness of breath and wheezing. Cardiovascular: Negative for chest pain, palpitations and leg swelling.    Gastrointestinal: Negative for abdominal pain, blood in stool, constipation, diarrhea, nausea and

## 2020-03-05 ENCOUNTER — TELEPHONE (OUTPATIENT)
Dept: CARDIOLOGY CLINIC | Age: 60
End: 2020-03-05

## 2020-03-19 ENCOUNTER — TELEPHONE (OUTPATIENT)
Dept: CARDIOLOGY CLINIC | Age: 60
End: 2020-03-19

## 2020-04-07 ENCOUNTER — TELEPHONE (OUTPATIENT)
Dept: CARDIOLOGY CLINIC | Age: 60
End: 2020-04-07

## 2020-04-07 NOTE — TELEPHONE ENCOUNTER
Patient does not need Cardiac CT.  We cancelled order, also tried to call Darren Llanos back and phone just rang

## 2020-05-21 ENCOUNTER — TELEPHONE (OUTPATIENT)
Dept: CARDIOLOGY CLINIC | Age: 60
End: 2020-05-21

## 2020-05-21 NOTE — TELEPHONE ENCOUNTER
Patient would like to discuss switching xarelto due to cost. Patient states pharmacist recommended replacing with plavix as this would be more cost efficient for patient. Per Dr Ti Pineda patient should continue Bowen Rawls and will discuss with patient at follow up appointment Also TSH and LFT ordered (currently on Amiodarone therapy) and patient to complete prior to appointment. Patient aware and voiced understanding, Orders placed. Patient advised to call between now and appointment with any further questions or concerns. Patient voiced understanding.

## 2020-06-16 ENCOUNTER — HOSPITAL ENCOUNTER (OUTPATIENT)
Age: 60
Discharge: HOME | End: 2020-06-16
Payer: COMMERCIAL

## 2020-06-16 LAB
ALBUMIN SERPL-MCNC: 4.5 GM/DL (ref 3.4–5)
ALP BLD-CCNC: 76 IU/L (ref 40–129)
ALT SERPL-CCNC: 26 U/L (ref 10–40)
AST SERPL-CCNC: 20 IU/L (ref 15–37)
BILIRUB SERPL-MCNC: 0.4 MG/DL (ref 0–1)
BILIRUBIN DIRECT: 0.2 MG/DL (ref 0–0.3)
BILIRUBIN, INDIRECT: 0.2 MG/DL (ref 0–0.7)
TOTAL PROTEIN: 7.5 GM/DL (ref 6.4–8.2)
TSH HIGH SENSITIVITY: 4.8 UIU/ML (ref 0.27–4.2)

## 2020-06-16 PROCEDURE — 80076 HEPATIC FUNCTION PANEL: CPT

## 2020-06-16 PROCEDURE — 36415 COLL VENOUS BLD VENIPUNCTURE: CPT

## 2020-06-16 PROCEDURE — 84443 ASSAY THYROID STIM HORMONE: CPT

## 2020-06-24 ENCOUNTER — TELEPHONE (OUTPATIENT)
Dept: CARDIOLOGY CLINIC | Age: 60
End: 2020-06-24

## 2020-06-24 ENCOUNTER — VIRTUAL VISIT (OUTPATIENT)
Dept: CARDIOLOGY CLINIC | Age: 60
End: 2020-06-24
Payer: COMMERCIAL

## 2020-06-24 PROCEDURE — 99442 PR PHYS/QHP TELEPHONE EVALUATION 11-20 MIN: CPT | Performed by: INTERNAL MEDICINE

## 2020-06-24 ASSESSMENT — ENCOUNTER SYMPTOMS
CHEST TIGHTNESS: 0
COUGH: 0
CONSTIPATION: 0
SHORTNESS OF BREATH: 0
BACK PAIN: 0
EYE PAIN: 0
COLOR CHANGE: 0
VOMITING: 0
DIARRHEA: 0
NAUSEA: 0
ABDOMINAL PAIN: 0
BLOOD IN STOOL: 0
PHOTOPHOBIA: 0
WHEEZING: 0

## 2020-06-24 NOTE — PROGRESS NOTES
rivaroxaban (XARELTO) 20 MG TABS tablet Take 1 tablet by mouth daily (with breakfast) (Patient not taking: Reported on 6/24/2020) 14 tablet 0     No current facility-administered medications on file prior to visit. Review of Systems:   Review of Systems   Constitutional: Negative for activity change, chills, fatigue and fever. HENT: Negative for congestion, ear pain and tinnitus. Eyes: Negative for photophobia, pain and visual disturbance. Respiratory: Negative for cough, chest tightness, shortness of breath (with exertion) and wheezing. Cardiovascular: Negative for chest pain, palpitations and leg swelling. Gastrointestinal: Negative for abdominal pain, blood in stool, constipation, diarrhea, nausea and vomiting. Endocrine: Negative for cold intolerance and heat intolerance. Genitourinary: Negative for dysuria, flank pain and hematuria. Musculoskeletal: Positive for arthralgias. Negative for back pain, myalgias and neck stiffness. Skin: Negative for color change and rash. Allergic/Immunologic: Negative for food allergies. Neurological: Negative for dizziness, light-headedness, numbness and headaches. Hematological: Does not bruise/bleed easily. Psychiatric/Behavioral: Negative for agitation, behavioral problems and confusion. Examination:      Patient-Reported Vitals 6/24/2020   Patient-Reported Weight 200lbs   Patient-Reported Height 67\"   Patient-Reported Systolic 611   Patient-Reported Diastolic 62   Patient-Reported Pulse 57      Physical examination was not performed as this is patient initiated telephone visit. Previous clinic examination as mentioned below    There were no vitals filed for this visit. There is no height or weight on file to calculate BMI. Physical Exam  Constitutional:       Appearance: She is well-developed. HENT:      Head: Normocephalic and atraumatic. Eyes:      General:         Right eye: No discharge.       Conjunctiva/sclera: Conjunctivae normal.      Pupils: Pupils are equal, round, and reactive to light. Neck:      Musculoskeletal: Normal range of motion. Thyroid: No thyromegaly. Vascular: No JVD. Cardiovascular:      Heart sounds: Murmur: grade 2/6 systolic murmur. No friction rub. Comments: Irregular rhythm and rate  Pulmonary:      Effort: Pulmonary effort is normal. No respiratory distress. Breath sounds: Normal breath sounds. No stridor. No wheezing. Abdominal:      General: Bowel sounds are normal. There is no distension. Palpations: Abdomen is soft. Tenderness: There is no abdominal tenderness. Musculoskeletal: Normal range of motion. General: No tenderness. Skin:     General: Skin is warm and dry. Findings: No erythema or rash. Neurological:      Mental Status: She is alert and oriented to person, place, and time. Cranial Nerves: No cranial nerve deficit.    Psychiatric:         Behavior: Behavior normal.               CBC:   Lab Results   Component Value Date    WBC 9.0 12/10/2019    HGB 13.3 12/10/2019    HCT 43.5 12/10/2019     12/10/2019     Lipids:  Lab Results   Component Value Date    CHOL 216 (H) 05/07/2019    TRIG 157 (H) 05/07/2019    HDL 63 05/07/2019    LDLCALC 127 (H) 12/18/2012    LDLDIRECT 150 (H) 05/07/2019     PT/INR:   Lab Results   Component Value Date    INR 1.33 12/06/2019        BMP:    Lab Results   Component Value Date     12/10/2019    K 4.4 12/10/2019     12/10/2019    CO2 26 12/10/2019    BUN 17 12/10/2019     CMP:   Lab Results   Component Value Date    AST 20 06/16/2020    PROT 7.5 06/16/2020    BILITOT 0.4 06/16/2020    ALKPHOS 76 06/16/2020     TSH:  No results found for: TSH    EKGINTERPRETATION - EKG Interpretation:  Sinus rhythm      IMPRESSION / RECOMMENDATIONS:     Sp ablation of atrial fibrillation - PV isolation and posterior wall isolation  Long standing persistent atrial fibrillation  Moderate to severe LV

## 2020-06-24 NOTE — TELEPHONE ENCOUNTER
Dr Haseeb Price states patient needs to continue Xarelto but this medication is very expensive for the patient. He would like to know if this patient would qualify for a tier exception. Did mail out copay cards to patient also.

## 2020-07-07 ENCOUNTER — TELEPHONE (OUTPATIENT)
Dept: CARDIOLOGY CLINIC | Age: 60
End: 2020-07-07

## 2020-07-07 NOTE — TELEPHONE ENCOUNTER
CALLED AND LM FOR THE PATIENT TO CALL THE OFFICE AND SCHEDULE HER ECHO THAT  HAS ORDERED FOR HER TO HAVE DONE

## 2020-07-30 ENCOUNTER — TELEPHONE (OUTPATIENT)
Dept: CARDIOLOGY CLINIC | Age: 60
End: 2020-07-30

## 2020-07-30 NOTE — TELEPHONE ENCOUNTER
Pt called and states needs to speak with you about echo and ov. Did not want vm, due to she is at work.  Call her back today after 330 pm per pt

## 2020-12-27 ENCOUNTER — HOSPITAL ENCOUNTER (EMERGENCY)
Age: 60
Discharge: HOME | End: 2020-12-27
Payer: COMMERCIAL

## 2020-12-27 VITALS
SYSTOLIC BLOOD PRESSURE: 161 MMHG | WEIGHT: 210 LBS | HEART RATE: 79 BPM | BODY MASS INDEX: 32.96 KG/M2 | TEMPERATURE: 98.9 F | OXYGEN SATURATION: 97 % | HEIGHT: 67 IN | DIASTOLIC BLOOD PRESSURE: 61 MMHG | RESPIRATION RATE: 17 BRPM

## 2020-12-27 PROCEDURE — 90715 TDAP VACCINE 7 YRS/> IM: CPT | Performed by: PHYSICIAN ASSISTANT

## 2020-12-27 PROCEDURE — 99282 EMERGENCY DEPT VISIT SF MDM: CPT

## 2020-12-27 PROCEDURE — 90471 IMMUNIZATION ADMIN: CPT | Performed by: PHYSICIAN ASSISTANT

## 2020-12-27 PROCEDURE — 6360000002 HC RX W HCPCS: Performed by: PHYSICIAN ASSISTANT

## 2020-12-27 PROCEDURE — 12001 RPR S/N/AX/GEN/TRNK 2.5CM/<: CPT

## 2020-12-27 PROCEDURE — 2500000003 HC RX 250 WO HCPCS: Performed by: PHYSICIAN ASSISTANT

## 2020-12-27 RX ORDER — LIDOCAINE HYDROCHLORIDE 20 MG/ML
5 INJECTION, SOLUTION EPIDURAL; INFILTRATION; INTRACAUDAL; PERINEURAL ONCE
Status: COMPLETED | OUTPATIENT
Start: 2020-12-27 | End: 2020-12-27

## 2020-12-27 RX ADMIN — TETANUS TOXOID, REDUCED DIPHTHERIA TOXOID AND ACELLULAR PERTUSSIS VACCINE, ADSORBED 0.5 ML: 5; 2.5; 8; 8; 2.5 SUSPENSION INTRAMUSCULAR at 20:59

## 2020-12-27 RX ADMIN — LIDOCAINE HYDROCHLORIDE 5 ML: 20 INJECTION, SOLUTION EPIDURAL; INFILTRATION; INTRACAUDAL; PERINEURAL at 21:00

## 2020-12-27 ASSESSMENT — PAIN SCALES - GENERAL: PAINLEVEL_OUTOF10: 2

## 2020-12-27 ASSESSMENT — PAIN DESCRIPTION - PAIN TYPE: TYPE: ACUTE PAIN

## 2020-12-27 ASSESSMENT — PAIN DESCRIPTION - LOCATION: LOCATION: HAND

## 2020-12-27 ASSESSMENT — PAIN DESCRIPTION - ORIENTATION: ORIENTATION: RIGHT

## 2020-12-28 ENCOUNTER — TELEPHONE (OUTPATIENT)
Dept: FAMILY MEDICINE CLINIC | Age: 60
End: 2020-12-28

## 2020-12-28 NOTE — ED PROVIDER NOTES
EMERGENCY DEPARTMENT ENCOUNTER        PCP: Julian Markham MD    [address] [facility]    Chief Complaint   Patient presents with    Laceration     R hand - cut on pearing knife - new clean knife , taking xarelto          This patient was not evaluated by the attending physician. I have independently evaluated this patient . HPI    Joey Moss is a 56 y.o. female who presents with laceration to right hand. Onset prior to arrival.  Context is patient was cutting apples and excellently cut hypothenar region of right hand. There is associated pain and bleeding. Bleeding has since stopped. Pain does not radiate. No distal numbness, tingling, weakness, functional/motor deficit. Pt denies foreign body sensation. Unknown Tetanus Vaccination Status      REVIEW OF SYSTEMS    General:   Denies symptoms preceding injury. Skin: + Laceration. SEE HPI  Musculoskeletal:  No distal numbness, tingling. No obvious tendon or motor deficits. Denies any other musculoskeletal injuries or skin trauma. All other review of systems are negative  See HPI and nursing notes for additional information     PAST MEDICAL & SURGICAL HISTORY    Past Medical History:   Diagnosis Date    Atrial fibrillation ([address]     H/O cardiac catheterization 08/15/2019     normal coronaries, normal LVEF    History of cardioversion 08/15/2019    Patient is successfully cardioverted into NSR.  History of cardioversion 08/27/2019    Impression: Patient is successfully cardioverted into NSR.  History of transesophageal echocardiography (ALAN) 08/15/2019    There is no evidence of thrombus in the Left Atrial Appendage. Negative Buble Study, Mod MR, Mild TR    History of transesophageal echocardiography (ALAN) 08/27/2019     Sclerotic aortic valve.  Severe TR, Mod MR.    Hyperlipidemia      Past Surgical History:   Procedure Laterality Date    APPENDECTOMY      ATRIAL ABLATION SURGERY Left 12/09/2019    Atrial fib ablation (PVI & post wall)   SECTION      CHOLECYSTECTOMY      HYSTERECTOMY         CURRENT MEDICATIONS    Current Outpatient Rx   Medication Sig Dispense Refill    metoprolol tartrate (LOPRESSOR) 25 MG tablet Take 1 tablet by mouth 2 times daily 180 tablet 1    rivaroxaban (XARELTO) 20 MG TABS tablet Take 1 tablet by mouth daily (with breakfast) (Patient not taking: Reported on 2020) 14 tablet 0    rivaroxaban (XARELTO) 20 MG TABS tablet Take 1 tablet by mouth daily (with breakfast) 90 tablet 3    aspirin 81 MG tablet Take 81 mg by mouth daily      CPAP Machine MISC by Does not apply route every evening         ALLERGIES    Allergies   Allergen Reactions    Codeine      Nausea and vomiting        SOCIAL & FAMILY HISTORY    Social History     Socioeconomic History    Marital status:      Spouse name: None    Number of children: None    Years of education: None    Highest education level: None   Occupational History    None   Social Needs    Financial resource strain: None    Food insecurity     Worry: None     Inability: None    Transportation needs     Medical: None     Non-medical: None   Tobacco Use    Smoking status: Never Smoker    Smokeless tobacco: Never Used   Substance and Sexual Activity    Alcohol use: Never     Frequency: Never    Drug use: Never    Sexual activity: Yes     Partners: Male   Lifestyle    Physical activity     Days per week: None     Minutes per session: None    Stress: None   Relationships    Social connections     Talks on phone: None     Gets together: None     Attends L[employer] service: None     Active member of club or organization: None     Attends meetings of clubs or organizations: None     Relationship status: None    Intimate partner violence     Fear of current or ex partner: None     Emotionally abused: None     Physically abused: None     Forced sexual activity: None   Other Topics Concern    None   Social History Narrative    None     Family History Problem Relation Age of Onset    High Cholesterol Mother     Heart Surgery Mother     Hypertension Father            PHYSICAL EXAM    VITAL SIGNS: BP (!) 161/61   Pulse 79   Temp 98.9 °F (37.2 °C) (Oral)   Resp 17   Ht 5' 7\" (1.702 m)   Wt 210 lb (95.3 kg)   SpO2 97%   BMI 32.89 kg/m²   Constitutional:  Well developed, Appears comfortable  HEENT:  Normocephalic, Atraumatic. PERRL, EOMI. Musculoskeletal:  No gross deformities. No motor deficits. Distal sensation and capillary refill intact. Vascular: Distal pulses and capillary refill intact. Integument:    Over the hyperthenar region of the palm of the right hand there is a laceration measuring approximately 4.5 cm in length. No active bleeding. No foreign body. Able to fully extend and flex all fingers against resistance. No obvious foreign body on initial inspection. No obvious tendon involvement    Distal sensation, capillary refill intact. Distal joints with full range of motion    See below for further details. Neurologic:  Awake and alert, normal flow of speech. CN 2-12 intact. Psychiatric: Cooperative, pleasant affect        ________________________________________________________________________       Procedure Note - Philipp Thomas PA-C      Laceration Repair Procedure Note    Indication: Skin Laceration -right hand    Procedure:   - Procedure explained, including risks and benefits explained to the patient who expressed understanding. All questions were answered. Verbal consent obtained. - The Wound was prepped and draped in the usual sterile fashion using Betadine and sterile saline.  - The wound is anesthetized using 2% lidocaine, approximately 0.5 ml  - Wound was explored to it's depth:    no foreign bodies. no compromise of neurovascular or tendon structures  - Wound was irrigated with copious amounts of sterile saline and mechanically debrided utilizing sterile gauze.   - The laceration was Closed with 5-0 Ethilon sutures, total number of 1,  simple interrupted  - Hemostasis and good cosmesis was achieved. Blood loss minimal.  - The wound area was then dressed with Sterile nonstick dressing, sterile gauze, and tape. - Patient tolerated procedure well without complications. Post procedure exam of the affected region reveals distal sensation, motor, capillary refill, and pulses intact    Total repaired wound length: 1.4 cm  ________________________________________________________________________          ED COURSE & MEDICAL DECISION MAKING        I discussed possibility of infection, retained foreign body, tendon injury, nerve injury. Wound care instructions discussed with patient today. Wound check in 2-3 days. Suture removal in 7 days. (discussed today). Of note, patient's blood pressure elevated. This is likely at least in part due to pain. Patient agrees to closely monitor and discuss with PCP if remains elevated. Return to emergency Department precautions were discussed in detail with patient who understands and agrees. Clinical  IMPRESSION    1. Laceration of right hand without foreign body, initial encounter              Comment: Please note this report has been produced using speech recognition software and may contain errors related to that system including errors in grammar, punctuation, and spelling, as well as words and phrases that may be inappropriate. If there are any questions or concerns please feel free to contact the dictating provider for clarification.          Esther Marquis  12/27/20 [id number]

## 2020-12-28 NOTE — TELEPHONE ENCOUNTER
So it is okay for her to leave the stitches in those extra 2 days.   She may return to work with the wound covered as long as she does not have to submerge her hands in liquid or work in a dirty environment

## 2020-12-28 NOTE — TELEPHONE ENCOUNTER
Patient was seen at ED last night and got 1 stitch on her hand and it is to come out on [employer] 1/3/2021 but patient has an appt already on 1/6/2021 and would like to know if it is ok of the stitch stays in 2 more days so the patient does not have to make 2 trips into town? Also the Ed dr gave the patient a work not to return on Monday 1/4 but should she return to work with a stitch in her hand? Also the note does not have a return to work date can we do this when she comes in on 1/6/2021?  Please advise

## 2020-12-29 NOTE — TELEPHONE ENCOUNTER
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  Patient will return to work on Monday and if she has issues she will call our office

## 2021-02-23 DIAGNOSIS — Z98.890 S/P ABLATION OF ATRIAL FIBRILLATION: ICD-10-CM

## 2021-02-23 DIAGNOSIS — Z86.79 S/P ABLATION OF ATRIAL FIBRILLATION: ICD-10-CM

## 2021-02-23 NOTE — TELEPHONE ENCOUNTER
90 days with refills asking if thsi can be sent today   They are sending other medications out as well

## 2021-04-05 ENCOUNTER — PROCEDURE VISIT (OUTPATIENT)
Dept: CARDIOLOGY CLINIC | Age: 61
End: 2021-04-05
Payer: COMMERCIAL

## 2021-04-05 DIAGNOSIS — Z86.79 S/P ABLATION OF ATRIAL FIBRILLATION: ICD-10-CM

## 2021-04-05 DIAGNOSIS — Z98.890 S/P ABLATION OF ATRIAL FIBRILLATION: ICD-10-CM

## 2021-04-05 LAB
LV EF: 58 %
LVEF MODALITY: NORMAL

## 2021-04-05 PROCEDURE — 93306 TTE W/DOPPLER COMPLETE: CPT | Performed by: INTERNAL MEDICINE

## 2021-04-19 ENCOUNTER — OFFICE VISIT (OUTPATIENT)
Dept: CARDIOLOGY CLINIC | Age: 61
End: 2021-04-19
Payer: COMMERCIAL

## 2021-04-19 VITALS
DIASTOLIC BLOOD PRESSURE: 70 MMHG | SYSTOLIC BLOOD PRESSURE: 120 MMHG | WEIGHT: 213.8 LBS | BODY MASS INDEX: 33.56 KG/M2 | HEIGHT: 67 IN | HEART RATE: 88 BPM

## 2021-04-19 DIAGNOSIS — G47.33 OSA (OBSTRUCTIVE SLEEP APNEA): ICD-10-CM

## 2021-04-19 DIAGNOSIS — Z98.890 S/P ABLATION OF ATRIAL FIBRILLATION: Primary | ICD-10-CM

## 2021-04-19 DIAGNOSIS — Z86.79 S/P ABLATION OF ATRIAL FIBRILLATION: Primary | ICD-10-CM

## 2021-04-19 DIAGNOSIS — I51.9 LV DYSFUNCTION: ICD-10-CM

## 2021-04-19 PROCEDURE — 99214 OFFICE O/P EST MOD 30 MIN: CPT | Performed by: NURSE PRACTITIONER

## 2021-04-19 ASSESSMENT — ENCOUNTER SYMPTOMS
CONSTIPATION: 0
SINUS PRESSURE: 0
EYE ITCHING: 0
BLOOD IN STOOL: 0
DIARRHEA: 0
NAUSEA: 0
BACK PAIN: 0
VOMITING: 0
SINUS PAIN: 0
EYE REDNESS: 0
ABDOMINAL DISTENTION: 0
COLOR CHANGE: 0
SHORTNESS OF BREATH: 0
COUGH: 0
CHEST TIGHTNESS: 0
ABDOMINAL PAIN: 0
WHEEZING: 0

## 2021-04-19 NOTE — LETTER
Aaron Smith  1960  [id number]    Have you had any Chest Pain that is not new? - No          Have you had any Shortness of Breath - No      Have you had any dizziness - No       Have you had any palpitations that are not new? - No      Is the patient on any of the following medications -   If Yes DO EKG - Needs done every 6 months    Do you have any edema - swelling in No          If we do not have these labs you are retrieve these labs for these providers!     Do you have a surgery or procedure scheduled in the near future - No       Ask patient if they want to sign up for [employer] if they are not already signed up     Check to see if we have an E-MAIL on file for the patient     Check medication list thoroughly!!! AND RECONCILE OUTSIDE MEDICATIONS  If dose has changed change the entire order not just the MG  BE SURE TO ASK PATIENT IF THEY NEED MEDICATION REFILLS     At check out add to every patient's \"wrap up\" the following dot phrase AFTERHOURSEDUCATION and ensure we explain this to our patients

## 2021-04-19 NOTE — PROGRESS NOTES
Electrophysiology FU Note      Reason for consultation:  ATRIAL FIBRILLATION    Chief complaint: denies cardiac complaints. Here to discuss follow up echo results. Has history of atrial fibrillation    Referring physician: Gerry Lynch      Primary care physician: Ankit Russo MD      History of Present Illness: This visit 4/19/2021  She is here today for follow-up on atrial fibrillation. She had an ablation for atrial fibrillation in December 2019. She denies chest pain, palpitations, shortness of breath, dizziness, lightheadedness or edema. She reports that she has been feeling well. She does not drink caffeine or alcohol. She does not smoke cigarettes. Today she is here to discuss her atrial fibrillation and follow-up results on echo as she would like to come off of her Xarelto. She wears her CPAP nightly. Previous visit (6/24/2020)      Chief Complaint   Patient presents with    Atrial Fibrillation     Patient denies CP, SOB, dizziness, palpitations, dizziness or edema. Patient would like to discuss switching from xarelto to plavix due to cost. She spoke with pharmacist and was told plavix would be just as good. She did complete labs and is still taking amiodarone as directed. Denies alcohol or caffeine. Does drink a no caffeine coffee that she orders online that is organic. She also reports walking atleast 10,000 steps per day and HR goes no higher than 70's           Previous visit:    Chief Complaint   Patient presents with    Atrial Fibrillation     Consult for AFIB. - referred by DR. Jackson for management. Patient is noted to have atrial fibrillation for some years she thinks. Patient stopped taking medications - she was on cardizem before and now changed to metoprolol. Patient does have tiredness since last 2 years and her endurance has decreased. Shortness of breath with moderate exertion but not at rest. More noticeable in last few months.   pt denies any chest pain,dizziness,swelling to ankles or palpitations. Pastmedical history:   Past Medical History:   Diagnosis Date    Atrial fibrillation ([address]     H/O cardiac catheterization 08/15/2019     normal coronaries, normal LVEF    History of cardioversion 08/15/2019    Patient is successfully cardioverted into NSR.  History of cardioversion 2019    Impression: Patient is successfully cardioverted into NSR.  History of transesophageal echocardiography (ALAN) 08/15/2019    There is no evidence of thrombus in the Left Atrial Appendage. Negative Buble Study, Mod MR, Mild TR    History of transesophageal echocardiography (ALAN) 2019     Sclerotic aortic valve. Severe TR, Mod MR.    Hyperlipidemia        Surgical history :   Past Surgical History:   Procedure Laterality Date    APPENDECTOMY      ATRIAL ABLATION SURGERY Left 2019    Atrial fib ablation (PVI & post wall)     SECTION      CHOLECYSTECTOMY      HYSTERECTOMY         Family history:   Family History   Problem Relation Age of Onset    High Cholesterol Mother     Heart Surgery Mother     Hypertension Father        Social history :  reports that she has never smoked. She has never used smokeless tobacco. She reports that she does not drink alcohol or use drugs. Allergies   Allergen Reactions    Codeine      Nausea and vomiting        Current Outpatient Medications on File Prior to Visit   Medication Sig Dispense Refill    metoprolol tartrate (LOPRESSOR) 25 MG tablet Take 1 tablet by mouth 2 times daily 180 tablet 1    aspirin 81 MG tablet Take 81 mg by mouth daily      CPAP Machine MISC by Does not apply route every evening       No current facility-administered medications on file prior to visit. Review of Systems:   Review of Systems   Constitutional: Negative for activity change, fatigue and fever. HENT: Negative for congestion, sinus pressure and sinus pain.     Eyes: Negative for redness, itching and visual disturbance. Respiratory: Negative for cough, chest tightness, shortness of breath and wheezing. Cardiovascular: Negative for chest pain, palpitations and leg swelling. Gastrointestinal: Negative for abdominal distention, abdominal pain, blood in stool, constipation, diarrhea, nausea and vomiting. Endocrine: Negative for cold intolerance and heat intolerance. Genitourinary: Negative for difficulty urinating, dysuria and hematuria. Musculoskeletal: Positive for arthralgias. Negative for back pain, myalgias and neck stiffness. Skin: Negative for color change, pallor, rash and wound. Allergic/Immunologic: Negative for food allergies. Neurological: Negative for dizziness, light-headedness, numbness and headaches. Hematological: Does not bruise/bleed easily. Psychiatric/Behavioral: Negative for agitation, behavioral problems and confusion. The patient is not nervous/anxious. Vitals:    04/19/21 0845   BP: 120/70   Pulse: 88   Weight: 213 lb 12.8 oz (97 kg)   Height: 5' 7\" (1.702 m)        Body mass index is 33.49 kg/m². Physical Exam  Constitutional:       Appearance: Normal appearance. She is well-developed. She is not ill-appearing or diaphoretic. HENT:      Head: Normocephalic and atraumatic. Right Ear: External ear normal.      Left Ear: External ear normal.      Nose: No congestion or rhinorrhea. Mouth/Throat:      Mouth: Mucous membranes are moist.      Pharynx: Oropharynx is clear. Eyes:      General:         Right eye: No discharge. Left eye: No discharge. Conjunctiva/sclera: Conjunctivae normal.   Neck:      Musculoskeletal: Neck supple. No muscular tenderness. Thyroid: No thyromegaly. Vascular: No JVD. Cardiovascular:      Rate and Rhythm: Normal rate and regular rhythm. Heart sounds: Murmur (Grade 2 out of 6 systolic murmur) present. No friction rub. No gallop.     Pulmonary:      Effort: Pulmonary effort is normal. No respiratory distress. Breath sounds: Normal breath sounds. No stridor. No wheezing or rhonchi. Abdominal:      General: Bowel sounds are normal. There is no distension. Palpations: Abdomen is soft. Tenderness: There is no abdominal tenderness. Musculoskeletal: Normal range of motion. Right lower leg: No edema. Left lower leg: No edema. Skin:     General: Skin is warm and dry. Neurological:      Mental Status: She is alert and oriented to person, place, and time. Psychiatric:         Mood and Affect: Mood normal.         Behavior: Behavior normal.         Thought Content:  Thought content normal.         Judgment: Judgment normal.       CBC:   Lab Results   Component Value Date    WBC 9.0 12/10/2019    HGB 13.3 12/10/2019    HCT 43.5 12/10/2019     12/10/2019     Lipids:  Lab Results   Component Value Date    CHOL 216 (H) 05/07/2019    TRIG 157 (H) 05/07/2019    HDL 63 05/07/2019    LDLCALC 127 (H) 12/18/2012    LDLDIRECT 150 (H) 05/07/2019     PT/INR:   Lab Results   Component Value Date    INR 1.33 12/06/2019        BMP:    Lab Results   Component Value Date     12/10/2019    K 4.4 12/10/2019     12/10/2019    CO2 26 12/10/2019    BUN 17 12/10/2019     CMP:   Lab Results   Component Value Date    AST 20 06/16/2020    PROT 7.5 06/16/2020    BILITOT 0.4 06/16/2020    ALKPHOS 76 06/16/2020     TSH:     EKG Interpretation:       IMPRESSION / RECOMMENDATIONS:     Status post ablation of atrial fibrillation-PV isolation and posterior wall isolation  History of longstanding persistent atrial fibrillation  History of moderate to severe LV dysfunction  ANISA    Patient continues to feel well  She denies any further episodes of palpitations, shortness of breath, or tachycardia  I reviewed recent echo with patient  4/5/2020 LVEF 55 to 60%  This is improved since no longer have afib episodes  I did review Dr Weir Speak previous note with patient    Chads vasc score is as follows: HF/LV dysfunction No (0)   HTN  No (0)   Age greater /equal 75 No (0)   DM No (0)   Stroke/TIA/TE No (0)   Vascular Disease No (0)   Age 74-69 No (0)   Sex  Female (1)    Chads score of 1  Since EF has improved to 55 to 60% we will discontinue the Xarelto   will continue metoprolol tartrate 25 mg BID as BP and HR are stable  Vitals:    04/19/21 0845   BP: 120/70   Pulse: 88     Patient is compliant with CPAP- continue to wear cpap     Patient to follow-up and 6 months          Yvon Lindquist, SARA - CNP, 4/19/2021 9:01 AM

## 2021-10-07 ENCOUNTER — HOSPITAL ENCOUNTER (EMERGENCY)
Age: 61
Discharge: HOME | End: 2021-10-07
Payer: COMMERCIAL

## 2021-10-07 ENCOUNTER — APPOINTMENT (OUTPATIENT)
Dept: GENERAL RADIOLOGY | Age: 61
End: 2021-10-07
Payer: COMMERCIAL

## 2021-10-07 ENCOUNTER — NURSE TRIAGE (OUTPATIENT)
Dept: OTHER | Facility: CLINIC | Age: 61
End: 2021-10-07

## 2021-10-07 VITALS
BODY MASS INDEX: 33.27 KG/M2 | SYSTOLIC BLOOD PRESSURE: 162 MMHG | DIASTOLIC BLOOD PRESSURE: 70 MMHG | TEMPERATURE: 98.4 F | WEIGHT: 212 LBS | RESPIRATION RATE: 18 BRPM | HEIGHT: 67 IN | HEART RATE: 88 BPM | OXYGEN SATURATION: 98 %

## 2021-10-07 DIAGNOSIS — S90.32XA CONTUSION OF LEFT FOOT, INITIAL ENCOUNTER: Primary | ICD-10-CM

## 2021-10-07 PROCEDURE — 99283 EMERGENCY DEPT VISIT LOW MDM: CPT

## 2021-10-07 PROCEDURE — 73630 X-RAY EXAM OF FOOT: CPT

## 2021-10-07 ASSESSMENT — PAIN SCALES - GENERAL: PAINLEVEL_OUTOF10: 7

## 2021-10-07 NOTE — Clinical Note
Martita Harding was seen and treated in our emergency department on 10/7/2021. She may return to work on 10/11/2021. If you have any questions or concerns, please don't hesitate to call.       BRAYAN Rene

## 2021-10-07 NOTE — TELEPHONE ENCOUNTER
Received call from [place] at [facility] with Red Flag Complaint. Brief description of triage: Jailyn Jiang is having swelling and bruising of left foot. Caller is having pain of 7/10 and bruising bottom of foot and toes, it is purple. Caller states that her feet are cold and that she has numbness in her feet. Triage indicates for patient to Go to ED Now. Care advice provided, patient verbalizes understanding; denies any other questions or concerns; instructed to call back for any new or worsening symptoms. Attention Provider: Thank you for allowing me to participate in the care of your patient. The patient was connected to triage in response to information provided to the [facility]. Please do not respond through this encounter as the response is not directed to a shared pool. Reason for Disposition   Purple or black skin on foot or toe    Answer Assessment - Initial Assessment Questions  1. ONSET: \"When did the pain start? \"       A few months ago    2. LOCATION: \"Where is the pain located? \"       Arch of foot    3. PAIN: \"How bad is the pain? \"    (Scale 1-10; or mild, moderate, severe)    -  MILD (1-3): doesn't interfere with normal activities     -  MODERATE (4-7): interferes with normal activities (e.g., work or school) or awakens from sleep, limping     -  SEVERE (8-10): excruciating pain, unable to do any normal activities, unable to walk      7/10    4. WORK OR EXERCISE: \"Has there been any recent work or exercise that involved this part of the body? \"       No    5. CAUSE: \"What do you think is causing the foot pain? \"      Unsure    6. OTHER SYMPTOMS: \"Do you have any other symptoms? \" (e.g., leg pain, rash, fever, numbness)      Bruising of foot and toes on left side. Feels like maybe her left foot is asleep    7. PREGNANCY: \"Is there any chance you are pregnant? \" \"When was your last menstrual period? \"      n/a    Protocols used: FOOT PAIN-ADULT-[place]

## 2021-10-08 NOTE — ED NOTES
Registration notified of patient needing registered to be discharged.       Eden Frey, RN  10/07/21 [id number]

## 2021-10-08 NOTE — ED PROVIDER NOTES
EMERGENCY DEPARTMENT ENCOUNTER      PCP: Merlin Oliver MD    [facility]    Chief Complaint   Patient presents with    Foot Pain     left foot; swelling and bruising          This patient was not evaluated by the attending physician. I have independently evaluated this patient . HPI    Reginald Goldman is a 59 y.o. female who presents to the emergency department today with bruising to the second third and fourth toes near the metatarsophalangeal joint area and spreading into the dorsum of the foot. Patient denies any new injury or trauma. 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 She states that she had noticed the discoloration, she is denying any active pain. Denies discoloration swelling into the upper leg. Has good peripheral pulses, no coldness, no significant paresthesias or weakness. No history of peripheral artery disease. Has not anticoagulated. No history of blood issues. REVIEW OF SYSTEMS    General: No fever or chills  Skin: No lacerations or puncture wounds  Musculoskeletal: No other joint pain    All other review of systems are negative  See HPI and nursing notes for additional information       PAST MEDICAL & SURGICAL HISTORY    Past Medical History:   Diagnosis Date    Atrial fibrillation ([address]     H/O cardiac catheterization 08/15/2019     normal coronaries, normal LVEF    History of cardioversion 08/15/2019    Patient is successfully cardioverted into NSR.  History of cardioversion 08/27/2019    Impression: Patient is successfully cardioverted into NSR.  History of transesophageal echocardiography (ALAN) 08/15/2019    There is no evidence of thrombus in the Left Atrial Appendage. Negative Buble Study, Mod MR, Mild TR    History of transesophageal echocardiography (ALAN) 08/27/2019     Sclerotic aortic valve.  Severe TR, Mod MR.    Hyperlipidemia      Past Surgical History:   Procedure Laterality Date    APPENDECTOMY      ATRIAL ABLATION SURGERY Left 2019    Atrial fib ablation (PVI & post wall)     SECTION      CHOLECYSTECTOMY      HYSTERECTOMY         CURRENT MEDICATIONS    Current Outpatient Rx   Medication Sig Dispense Refill    metoprolol tartrate (LOPRESSOR) 25 MG tablet Take 1 tablet by mouth 2 times daily 180 tablet 1    aspirin 81 MG tablet Take 81 mg by mouth daily      CPAP Machine MISC by Does not apply route every evening         ALLERGIES    Allergies   Allergen Reactions    Codeine      Nausea and vomiting        SOCIAL & FAMILY HISTORY    Social History     Socioeconomic History    Marital status:      Spouse name: None    Number of children: None    Years of education: None    Highest education level: None   Occupational History    None   Tobacco Use    Smoking status: Never Smoker    Smokeless tobacco: Never Used   Vaping Use    Vaping Use: Never used   Substance and Sexual Activity    Alcohol use: Never    Drug use: Never    Sexual activity: Yes     Partners: Male   Other Topics Concern    None   Social History Narrative    None     Social Determinants of Health     Financial Resource Strain:     Difficulty of Paying Living Expenses:    Food Insecurity:     Worried About Running Out of Food in the Last Year:     Ran Out of Food in the Last Year:    Transportation Needs:     Lack of Transportation (Medical):      Lack of Transportation (Non-Medical):    Physical Activity:     Days of Exercise per Week:     Minutes of Exercise per Session:    Stress:     Feeling of Stress :    Social Connections:     Frequency of Communication with Friends and Family:     Frequency of Social Gatherings with Friends and Family:     Attends Confucianism Services:     Active Member of Clubs or Organizations:     Attends Club or Organization Meetings:     Marital Status:    Intimate Partner Violence:     Fear of Current or Ex-Partner:     Emotionally Abused:     Physically Abused:     Sexually Abused:      Family History   Problem Relation Age of Onset    High Cholesterol Mother     Heart Surgery Mother     Hypertension Father          PHYSICAL EXAM    VITAL SIGNS: BP (!) 162/70   Pulse 88   Temp 98.4 °F (36.9 °C) (Oral)   Resp 18   Ht 5' 7\" (1.702 m)   Wt 212 lb (96.2 kg)   SpO2 98%   BMI 33.20 kg/m²   Constitutional:  Well developed, appears comfortable  HENT:  Atraumatic  Respiratory:  No retractions  Musculoskeletal:   Left Lower Extemity:  The {left foot demonstrates bruising and discoloration to the second third and fourth foot to the base of the metatarsal joints. There is mild bruising to the dorsum of the foot. There is no tenderness over the foot. No palpable defects. Range of motion  - no obvious deficits. The ankle joint is stable. Achilles tendon intact    No swelling, discoloration, or tenderness to palpation of the proximal to distal lower leg bones, ankle & toes  Distal capillary refill, sensation, motor intact. Vascular:  DP pulse 2+, capillary refill intact. Integument:  No open wounds of the left ankle   Neurologic:  Awake alert, no slurred speech     RADIOLOGY   XR FOOT LEFT (MIN 3 VIEWS)    Result Date: 10/7/2021  EXAMINATION: THREE XRAY VIEWS OF THE LEFT FOOT 10/7/2021 4:27 pm COMPARISON: None. HISTORY: ORDERING SYSTEM PROVIDED HISTORY: pain TECHNOLOGIST PROVIDED HISTORY: Reason for exam:->pain Reason for Exam: toe pain, bruising and swelling Acuity: Acute Type of Exam: Initial Additional signs and symptoms: na Relevant Medical/Surgical History: na FINDINGS: There is evidence of previous distal 1st metatarsal osteotomy with median eminence shaving. No acute fracture identified. No stress fractures are seen. Lisfranc joint is congruent on these nonweightbearing images. Subtalar joint unremarkable. No acute abnormality identified. ED COURSE & MEDICAL DECISION MAKING        I provided a prescription for pain medication.   Recommend ice and elevation as much as possible. Advance off of crutches to full weightbearing as tolerated. At this point etiology is unclear, low suspicion for blood clot, arterial disease, bony fracture. Will advise staying off of the foot, icing close monitoring with primary care for the potential blood work. To followup with PCP if symptoms do not improve over the next 5-7days. Patient agrees to return emergency department if symptoms worsen or any new symptoms develop. Vital signs and nursing notes reviewed during ED course. All pertinent Lab data and radiographic results reviewed with patient at bedside. The patient and/or the family were informed of the results of any tests/labs/imaging, the treatment plan, and time was allotted to answer questions. Clinical  IMPRESSION    1. Contusion of left foot, initial encounter      Comment: Please note this report has been produced using speech recognition software and may contain errors related to that system including errors in grammar, punctuation, and spelling, as well as words and phrases that may be inappropriate. If there are any questions or concerns please feel free to contact the dictating provider for clarification.          Douglas Munguia, PA  10/08/21 2845

## 2021-10-14 ENCOUNTER — OFFICE VISIT (OUTPATIENT)
Dept: FAMILY MEDICINE CLINIC | Age: 61
End: 2021-10-14
Payer: COMMERCIAL

## 2021-10-14 VITALS
WEIGHT: 212 LBS | DIASTOLIC BLOOD PRESSURE: 72 MMHG | HEART RATE: 73 BPM | OXYGEN SATURATION: 96 % | BODY MASS INDEX: 33.2 KG/M2 | SYSTOLIC BLOOD PRESSURE: 112 MMHG

## 2021-10-14 DIAGNOSIS — Z12.31 ENCOUNTER FOR SCREENING MAMMOGRAM FOR MALIGNANT NEOPLASM OF BREAST: ICD-10-CM

## 2021-10-14 DIAGNOSIS — R22.42 LOCALIZED SWELLING OF LEFT FOOT: ICD-10-CM

## 2021-10-14 DIAGNOSIS — L72.0 WEN: ICD-10-CM

## 2021-10-14 DIAGNOSIS — Z12.11 SCREEN FOR COLON CANCER: ICD-10-CM

## 2021-10-14 DIAGNOSIS — M76.02 GLUTEAL TENDINITIS, LEFT HIP: Primary | ICD-10-CM

## 2021-10-14 PROCEDURE — 99214 OFFICE O/P EST MOD 30 MIN: CPT | Performed by: FAMILY MEDICINE

## 2021-10-14 RX ORDER — METHYLPREDNISOLONE 4 MG/1
TABLET ORAL
Qty: 1 KIT | Refills: 0 | Status: SHIPPED | OUTPATIENT
Start: 2021-10-14 | End: 2022-06-21

## 2021-10-14 NOTE — PROGRESS NOTES
Chief Complaint   Patient presents with    Foot Swelling     left foot, occasionaly x 2+ months. pt has seen some bruising in her toes that comes and goes.  Cyst     in scalp, pt would like to have them removed. pt no longer on blood thinner     Hip Pain     left hip pain x 2 weeks, pt has trouble standing and putting pressure on that hip        [place] NARRATIVE:    Patient here actually with three complaints as above. She called us earlier about bilateral foot pain and left foot bruising and swelling, she spoke to the triage nurse who referred her to the ER for evaluation. An x-ray evaluation at the ER revealed no findings. She is recommended to follow-up here. She does not really have a history of injury or trauma. She did remotely have a bunion repair on that side. Her second complaint is of multiple cysts in her scalp. Over many years she has developed nodules in her scalp consistent with sebaceous cysts or CLAUDIA'. She is off blood thinners she would like to have those removed if possible. They are not truly painful but are bothersome and the one on the back of her head does bother her when her head breast on the bed. Patient also with posterior left hip pain for about 2 weeks. This is also very bothersome. No new injury or lifting problem. It may be because of limping due to left foot pain. Other medical problems are stable. Medication list is updated. Patient is established unless otherwise noted. Above chief complaint and [place] obtained by this physician provider. Review of Systems   Constitutional: Negative for activity change, chills, fatigue and fever. HENT: Negative for congestion. Respiratory: Negative for cough, chest tightness, shortness of breath and wheezing. Cardiovascular: Negative for chest pain and leg swelling. Gastrointestinal: Negative for abdominal pain.    Musculoskeletal: Positive for arthralgias, back pain, gait problem, joint swelling (Foot swelling) and myalgias. Skin: Negative for rash. Psychiatric/Behavioral: Negative for behavioral problems and dysphoric mood. Allergies   Allergen Reactions    Codeine      Nausea and vomiting      Allergy historyupdated. Outpatient Medications Marked as Taking for the 10/14/21 encounter (Office Visit) with Lazaro Beck MD   Medication Sig Dispense Refill    methylPREDNISolone (MEDROL DOSEPACK) 4 MG tablet AS DIRECTED 1 kit 0       Tobacco use history updated. Social History     Tobacco Use   Smoking Status Never Smoker   Smokeless Tobacco Never Used        Nursing note reviewed. Vitals:    10/14/21 1617   BP: 112/72   Site: Left Upper Arm   Position: Sitting   Cuff Size: Medium Adult   Pulse: 73   SpO2: 96%   Weight: 212 lb (96.2 kg)          BP Readings from Last 3 Encounters:   10/14/21 112/72   10/07/21 (!) 162/70   04/19/21 120/70     Wt Readings from Last 3 Encounters:   10/14/21 212 lb (96.2 kg)   10/07/21 212 lb (96.2 kg)   04/19/21 213 lb 12.8 oz (97 kg)     Body mass index is 33.2 kg/m². No results found for this visit on 10/14/21. Physical Exam  Vitals and nursing note reviewed. Constitutional:       General: She is not in acute distress. Appearance: She is well-developed. She is not diaphoretic. HENT:      Head: Normocephalic. Eyes:      General:         Right eye: No discharge. Left eye: No discharge. Conjunctiva/sclera: Conjunctivae normal.      Pupils: Pupils are equal, round, and reactive to light. Cardiovascular:      Rate and Rhythm: Normal rate and regular rhythm. Heart sounds: Normal heart sounds. No murmur heard. Pulmonary:      Effort: Pulmonary effort is normal. No respiratory distress. Breath sounds: Normal breath sounds. No wheezing or rales.    Musculoskeletal:         General: Tenderness (Dorsum of foot is swollen and boggy, she has no pain with resisted flexion or extension of toes, metatarsophlangeal joints are not painful) present. Normal range of motion. Cervical back: Normal range of motion. Comments: Left posterior hip is tender over the medius gluteus, she has no lateral trochanteric bursitis tenderness and no sacroiliac tenderness. All pain is in the buttock area   Skin:     General: Skin is warm and dry. Findings: Lesion (Multiple firm slightly mobile nodules are noted in the scalp consistent with a sebaceous cyst) present. Neurological:      Mental Status: She is alert and oriented to person, place, and time. Psychiatric:         Behavior: Behavior normal.           _________________________________________________  Assessment:     1. Gluteal tendinitis, left hip  2. Encounter for screening mammogram for malignant neoplasm of breast  -     IMER DIGITAL SCREEN W OR WO CAD BILATERAL; Future  3. Localized swelling of left foot  4. [address], [place], [facility], [facility]  5. Screen for colon cancer  -     POCT Fecal Immunochemical Test (FIT); Future    Handouts are given for posterior hip pain. I think this will settle down on its own. For can be elevated we will continue to monitor. I do not think she has a vascular lesion or musculoskeletal lesion. Probably local soft tissue damage that is flared and could possibly related to longstanding or other previous bunion surgery. Test and mammogram were ordered for health maintenance issues. She is referred to general surgery, requesting Dr. Wilbern Essex specifically. To evaluate and possibly schedule for removal of cyst in the scalp. Problems listed above are stable and therapeutic plan is unchanged unless otherwise specified. See orders above and comments below for details of workup or medication orders. _________________________________________________  Plan:     Return if symptoms worsen or fail to improve.       Electronically signed by Major Murrieta MD on 10/14/21 at 4:51 PM EDT

## 2021-10-15 ASSESSMENT — ENCOUNTER SYMPTOMS
SHORTNESS OF BREATH: 0
WHEEZING: 0
CHEST TIGHTNESS: 0
COUGH: 0
BACK PAIN: 1
ABDOMINAL PAIN: 0

## 2021-10-28 DIAGNOSIS — Z12.11 SCREEN FOR COLON CANCER: ICD-10-CM

## 2021-10-28 LAB
CONTROL: NORMAL
HEMOCCULT STL QL: NORMAL

## 2021-10-28 PROCEDURE — 82274 ASSAY TEST FOR BLOOD FECAL: CPT | Performed by: FAMILY MEDICINE

## 2021-10-31 NOTE — RESULT ENCOUNTER NOTE
Stool test to screen for blood and colon cancer risk is negative or normal.  This does not eliminate the possibility of colon cancer but provides a screening test and risk is therefore much less with normal screening test

## 2022-02-09 ENCOUNTER — APPOINTMENT (OUTPATIENT)
Dept: GENERAL RADIOLOGY | Age: 62
End: 2022-02-09
Payer: COMMERCIAL

## 2022-02-09 ENCOUNTER — HOSPITAL ENCOUNTER (EMERGENCY)
Age: 62
Discharge: HOME | End: 2022-02-09
Attending: STUDENT IN AN ORGANIZED HEALTH CARE EDUCATION/TRAINING PROGRAM
Payer: COMMERCIAL

## 2022-02-09 VITALS
HEIGHT: 67 IN | HEART RATE: 74 BPM | BODY MASS INDEX: 31.39 KG/M2 | DIASTOLIC BLOOD PRESSURE: 75 MMHG | WEIGHT: 200 LBS | OXYGEN SATURATION: 97 % | RESPIRATION RATE: 18 BRPM | TEMPERATURE: 98.5 F | SYSTOLIC BLOOD PRESSURE: 148 MMHG

## 2022-02-09 DIAGNOSIS — M25.512 ACUTE PAIN OF LEFT SHOULDER: ICD-10-CM

## 2022-02-09 DIAGNOSIS — S52.591A OTHER CLOSED FRACTURE OF DISTAL END OF RIGHT RADIUS, INITIAL ENCOUNTER: Primary | ICD-10-CM

## 2022-02-09 DIAGNOSIS — I10 ESSENTIAL HYPERTENSION: ICD-10-CM

## 2022-02-09 PROCEDURE — 73110 X-RAY EXAM OF WRIST: CPT

## 2022-02-09 PROCEDURE — 73030 X-RAY EXAM OF SHOULDER: CPT

## 2022-02-09 PROCEDURE — 73130 X-RAY EXAM OF HAND: CPT

## 2022-02-09 PROCEDURE — 6370000000 HC RX 637 (ALT 250 FOR IP): Performed by: PHYSICIAN ASSISTANT

## 2022-02-09 PROCEDURE — 99285 EMERGENCY DEPT VISIT HI MDM: CPT

## 2022-02-09 RX ORDER — IBUPROFEN 600 MG/1
600 TABLET ORAL 4 TIMES DAILY PRN
Qty: 40 TABLET | Refills: 0 | Status: SHIPPED | OUTPATIENT
Start: 2022-02-09 | End: 2022-02-19

## 2022-02-09 RX ORDER — IBUPROFEN 600 MG/1
600 TABLET ORAL ONCE
Status: COMPLETED | OUTPATIENT
Start: 2022-02-09 | End: 2022-02-09

## 2022-02-09 RX ORDER — ACETAMINOPHEN 500 MG
500 TABLET ORAL 4 TIMES DAILY PRN
Qty: 40 TABLET | Refills: 0 | Status: SHIPPED | OUTPATIENT
Start: 2022-02-09 | End: 2022-02-19

## 2022-02-09 RX ADMIN — IBUPROFEN 600 MG: 600 TABLET, FILM COATED ORAL at 15:30

## 2022-02-09 ASSESSMENT — PAIN DESCRIPTION - LOCATION
LOCATION: WRIST
LOCATION_2: SHOULDER

## 2022-02-09 ASSESSMENT — PAIN DESCRIPTION - INTENSITY
RATING_2: 6
RATING_2: 4

## 2022-02-09 ASSESSMENT — PAIN SCALES - GENERAL: PAINLEVEL_OUTOF10: 8

## 2022-02-09 ASSESSMENT — PAIN DESCRIPTION - PAIN TYPE: TYPE: ACUTE PAIN

## 2022-02-09 ASSESSMENT — PAIN DESCRIPTION - ORIENTATION: ORIENTATION_2: LEFT

## 2022-02-09 NOTE — ED NOTES
Pt. reviewed discharge instructions, follow up instructions, and new medications. Pt. Aware of medications sent to pharmacy. Pt. verbalizes understanding with no further questions. Pt. ambulatory and not showing any signs of distress. Patient sugar tong checked by Darryle Mart PA. Patient provided sling.        Shelby Shabazz RN  02/09/22 [id number]

## 2022-02-09 NOTE — Clinical Note
Chinmay Sandoval was seen and treated in our emergency department on 2/9/2022. She may return to work on 02/14/2022. If you have any questions or concerns, please don't hesitate to call.       Jovani Tan PA-C

## 2022-02-09 NOTE — ED PROVIDER NOTES
Patient Identification  Aida Hashimoto is a 57 y.o. female    Chief Complaint  Fall (slipped on the ice this morning), Wrist Pain (right wrist pain), and Shoulder Pain (left shoulder pain)      HPI  (History provided by patient)  This is a 57 y.o. female who was brought in by pov presents emergency department for right wrist pain and left shoulder pain that started approximately 5 hours ago after slipping on ice while leaving her home. She reports she caught her right wrist in the banister of her walkway, had a hyperextension injury, since then has had swelling and pain in her wrist and inability to extend without significant pain. Denies paresthesias of the finger. Still able to use her hands open and close, is left-hand dominant. Patient then fell onto her left shoulder, reports she hit her shoulder on the stoop for approximately 4 feet in height, on the left shoulder, has pain with overhead extension, denies any distal left upper extremity paresthesias or radiation of pain. Patient reports fall was mechanical, no dyspnea, shortness of breath, vertigo prior to falling. Denies hitting her head, neck or back, no LOC, no nausea vomiting or headache. Does take a baby aspirin a day and has a history of A. fib status post ablation but denies any palpitations tachycardia. REVIEW OF SYSTEMS    Constitutional:  Denies fever, chills  HENT:  Denies sore throat or ear pain   Eyes: Denies vision changes, eye pain  Cardiovascular:  Denies chest pain, syncope  Respiratory:  Denies shortness of breath, cough   GI:  Denies abdominal pain, nausea, vomiting  :  Denies dysuria, discharge  Musculoskeletal:  Denies back pain  Skin:  Denies rash, pruritis  Neurologic:  Denies headache, focal weakness, or sensory changes     See HPI and nursing notes for additional information     I have reviewed the following nursing documentation:  Allergies:    Allergies   Allergen Reactions    Codeine      Nausea and vomiting        Past medical history:  has a past medical history of Atrial fibrillation ([facility] [address] H/O cardiac catheterization (08/15/2019), History of cardioversion (08/15/2019), History of cardioversion (2019), History of transesophageal echocardiography (ALAN) (08/15/2019), History of transesophageal echocardiography (ALAN) (2019), and Hyperlipidemia. Past surgical history:  has a past surgical history that includes Cholecystectomy; Appendectomy;  section; Hysterectomy; and Atrial ablation surgery (Left, 2019). Home medications:   Prior to Admission medications    Medication Sig Start Date End Date Taking? Authorizing Provider   methylPREDNISolone (MEDROL DOSEPACK) 4 MG tablet AS DIRECTED 10/14/21   Jose Thurman MD   metoprolol tartrate (LOPRESSOR) 25 MG tablet Take 1 tablet by mouth 2 times daily 21   SARA Carpenter - CNP   aspirin 81 MG tablet Take 81 mg by mouth daily    Historical Provider, MD   CPAP Machine MISC by Does not apply route every evening    Historical Provider, MD       Social history:  reports that she has never smoked. She has never used smokeless tobacco. She reports that she does not drink alcohol and does not use drugs. Family history:    Family History   Problem Relation Age of Onset    High Cholesterol Mother     Heart Surgery Mother     Hypertension Father          Exam  BP (!) 159/71   Pulse 77   Temp 98.3 °F (36.8 °C)   Resp 16   Ht 5' 7\" (1.702 m)   Wt 200 lb (90.7 kg)   SpO2 97%   BMI 31.32 kg/m²   Nursing note and vitals reviewed. Constitutional: Well developed, well nourished. No acute distress. HENT:      Head: Normocephalic and atraumatic. Ears: External ears normal.      Nose: Nose normal.     Mouth: Membrane mucosa moist and pink. No posterior oropharynx erythema or tonsillar edema  Eyes: Anicteric sclera. No discharge, PERRL  Neck: Supple. Trachea midline.   No tenderness palpation on the midline of the neck, full range of motion without pain.  Cardiovascular: RRR, no murmurs, rubs, or gallops, radial pulses 2+ bilaterally. Pulmonary/Chest: Effort normal. No respiratory distress. CTAB. No stridor. No wheezes. No rales. Abdominal: Soft. Nontender to palpation. No distension. No guarding, rebound tenderness, or evidence of ascites. Musculoskeletal: Right upper extremity no tenderness palpation of the right clavicle or right shoulder bones and bony landmarks, right elbow full range of motion no tenderness palpation along bony landmarks. No tenderness palpation of the mid to proximal radius and ulna. Tenderness palpation over the right dorsums carpal bones, approximately over the area of the lunate. No tenderness palpation of the snuffbox. Can perform finger abduction, thumb opposition as well as and has a strong  strength. Capillary refill is brisk. No sensory deficits. Left upper extremity no tenderness palpation along the clavicle or the left shoulder is bony landmarks. Patient has a negative drop arm, does have pain with empty can testing, negative Neer's, negative Jorgensen. Internal and external rotation are full. Neurovascularly intact. Neurological: Alert and oriented to person, place, and time. Normal muscle tone. Skin: Warm and dry. No rash. Psychiatric: Normal mood and affect. Behavior is normal.        Radiographs (if obtained):  [] The following radiograph was interpreted by myself in the absence of a radiologist:   [x] Radiologist's Report Reviewed:  XR WRIST RIGHT (MIN 3 VIEWS)   Preliminary Result   Nondisplaced fracture seen of the posterior aspect of the distal radius with   associated soft tissue swelling. XR SHOULDER LEFT (MIN 2 VIEWS)   Preliminary Result   No evidence of acute fracture or dislocation. Minimal degenerative changes   seen of the acromioclavicular junction.          XR HAND RIGHT (MIN 3 VIEWS)   Preliminary Result   Degenerative changes seen within the distal interphalangeal joints with   irregularity identified of the posterior aspect of the radius concerning for   nondisplaced fracture. .              Labs  No results found for this visit on 02/09/22. MDM  58-year-old female history of atrial fibrillation status post ablation on metoprolol presents emergency department with a chief complaint of mechanical fall with a right wrist and left shoulder injury. Patient is neurovascularly intact both in the right upper and left lower extremity there are no open wounds appreciated. Radiograph of the left shoulder is unremarkable, radiograph of the right hand and right wrist shows a nondisplaced fracture of the posterior radius. Patient's pain is well managed on current p.o. medication including Tylenol and Motrin, and was offered hydrocodone with acetaminophen for breakthrough pain but refused. Patient will be placed in a sugar tong splint of the right wrist, follow-up was Dr. Deven Balderas practice on outpatient basis. Return precautions were given to come to the emergency department for any increased pain, damage to the splint or numbness tingling in her fingertips. Patient verbalized understanding and agreement with plan. Note also be provided for 4 days. Low concern for compartment syndrome, patient is neurovascularly intact prior to discharge the emergency department. This patient was also seen and evaluated by Dr. Pastor Durbin    Differential Diagnosis: Fracture versus dislocation    Final Impression  No diagnosis found. Blood pressure (!) 159/71, pulse 77, temperature 98.3 °F (36.8 °C), resp. rate 16, height 5' 7\" (1.702 m), weight 200 lb (90.7 kg), SpO2 97 %. Disposition:  Discharge to home in stable condition. Patient was given scripts for the following medications. I counseled patient how to take these medications. New Prescriptions    No medications on file       This chart was generated using the [address] [place], [place] [address]  D[employer] system.  I created this record but it may contain dictation errors given the limitations of this technology.         Yandy Adrian PA-C  02/09/22 Vee Perez 20, VANESSA  02/09/22 Beth Cast PA-C  02/09/22 1822

## 2022-02-11 ENCOUNTER — OFFICE VISIT (OUTPATIENT)
Dept: ORTHOPEDIC SURGERY | Age: 62
End: 2022-02-11
Payer: COMMERCIAL

## 2022-02-11 VITALS
RESPIRATION RATE: 16 BRPM | OXYGEN SATURATION: 95 % | HEIGHT: 67 IN | BODY MASS INDEX: 31.39 KG/M2 | WEIGHT: 200 LBS | HEART RATE: 72 BPM

## 2022-02-11 DIAGNOSIS — S62.101A CLOSED FRACTURE OF RIGHT WRIST, INITIAL ENCOUNTER: Primary | ICD-10-CM

## 2022-02-11 PROCEDURE — 99203 OFFICE O/P NEW LOW 30 MIN: CPT | Performed by: STUDENT IN AN ORGANIZED HEALTH CARE EDUCATION/TRAINING PROGRAM

## 2022-02-11 ASSESSMENT — ENCOUNTER SYMPTOMS
COLOR CHANGE: 1
EYE PAIN: 0
EYE REDNESS: 0
STRIDOR: 0
VOMITING: 0
NAUSEA: 0
WHEEZING: 0
PHOTOPHOBIA: 0
BACK PAIN: 0
SHORTNESS OF BREATH: 0
FACIAL SWELLING: 0
COUGH: 0
ABDOMINAL PAIN: 0

## 2022-02-11 NOTE — LETTER
[facility]  [address]  Phone: [phone]  Fax: [phone]    Gay Browne DO        February 11, 2022     Patient: Natalia Wade   YOB: 1960   Date of Visit: 2/11/2022       To Whom It May Concern: It is my medical opinion that Norman Villalpando may return to work on 2/11/22 with the following restrictions: No lifting more than 1lb. No pushing or pulling with right arm until cleared by physician. .    If you have any questions or concerns, please don't hesitate to call.     Sincerely,        Gay Browne DO

## 2022-02-11 NOTE — PROGRESS NOTES
2/11/2022   Chief Complaint   Patient presents with    Fracture     right radius        History of Present Illness:                             Christo Fuentes is a 57 y.o. female right hand-dominant male referred by emergency room for evaluation and treatment of a right wrist injury. Patient states this occurred days prior. She states she was walking on her porch and slipped and was holding onto the railing with her right wrist. She had a hyper flexion injury of the wrist had immediate pain over the dorsum of the wrist.    The pain is currently rated moderate. The patient was originally seen at local emergency room where an x-ray was done, a fracture of the right dorsal radius identified and she  was placed in a sugar tong splint. The patient was subsequently referred to Orthopedics for further management. The splint has remained in place and is clean and dry. She  denies prior injury to right wrist, denies associated injuries, prior injury to the upper extremity. Denies numbness, tingling, fever, chills. 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 Is affecting ADLs. Pain is 6/10 at it's worst.    Outside reports reviewed: Outside imaging reviewed    Patient's medications, allergies, past medical, surgical, social and family histories were reviewed and updated as appropriate. No other treatment thus far    Medical History  Patient's medications, allergies, past medical, surgical, social and family histories were reviewed and updated as appropriate. Past Medical History:   Diagnosis Date    Atrial fibrillation ([address]     H/O cardiac catheterization 08/15/2019     normal coronaries, normal LVEF    History of cardioversion 08/15/2019    Patient is successfully cardioverted into NSR.  History of cardioversion 08/27/2019    Impression: Patient is successfully cardioverted into NSR.     History of transesophageal echocardiography (ALAN) 08/15/2019    There is no evidence of thrombus in the Left Atrial Appendage. Negative Buble Study, Mod MR, Mild TR    History of transesophageal echocardiography (ALAN) 2019     Sclerotic aortic valve. Severe TR, Mod MR.    Hyperlipidemia      Past Surgical History:   Procedure Laterality Date    APPENDECTOMY      ATRIAL ABLATION SURGERY Left 2019    Atrial fib ablation (PVI & post wall)     SECTION      CHOLECYSTECTOMY      HYSTERECTOMY       Family History   Problem Relation Age of Onset    High Cholesterol Mother     Heart Surgery Mother     Hypertension Father      Social History     Socioeconomic History    Marital status:      Spouse name: Not on file    Number of children: Not on file    Years of education: Not on file    Highest education level: Not on file   Occupational History    Not on file   Tobacco Use    Smoking status: Never Smoker    Smokeless tobacco: Never Used   Vaping Use    Vaping Use: Never used   Substance and Sexual Activity    Alcohol use: Never    Drug use: Never    Sexual activity: Yes     Partners: Male   Other Topics Concern    Not on file   Social History Narrative    Not on file     Social Determinants of Health     Financial Resource Strain:     Difficulty of Paying Living Expenses: Not on file   Food Insecurity:     Worried About Running Out of Food in the Last Year: Not on file    Dash of Food in the Last Year: Not on file   Transportation Needs:     Lack of Transportation (Medical): Not on file    Lack of Transportation (Non-Medical):  Not on file   Physical Activity:     Days of Exercise per Week: Not on file    Minutes of Exercise per Session: Not on file   Stress:     Feeling of Stress : Not on file   Social Connections:     Frequency of Communication with Friends and Family: Not on file    Frequency of Social Gatherings with Friends and Family: Not on file    Attends Confucianist Services: Not on file    Active Member of Clubs or Organizations: Not on file    Attends Club or Organization Meetings: Not on file    Marital Status: Not on file   Intimate Partner Violence:     Fear of Current or Ex-Partner: Not on file    Emotionally Abused: Not on file    Physically Abused: Not on file    Sexually Abused: Not on file   Housing Stability:     Unable to Pay for Housing in the Last Year: Not on file    Number of [place] in the Last Year: Not on file    Unstable Housing in the Last Year: Not on file     Current Outpatient Medications   Medication Sig Dispense Refill    ibuprofen (ADVIL;MOTRIN) 600 MG tablet Take 1 tablet by mouth 4 times daily as needed for Pain 40 tablet 0    acetaminophen (TYLENOL) 500 MG tablet Take 1 tablet by mouth 4 times daily as needed for Pain 40 tablet 0    metoprolol tartrate (LOPRESSOR) 25 MG tablet Take 1 tablet by mouth 2 times daily 180 tablet 1    aspirin 81 MG tablet Take 81 mg by mouth daily      CPAP Machine MISC by Does not apply route every evening      methylPREDNISolone (MEDROL DOSEPACK) 4 MG tablet AS DIRECTED (Patient not taking: Reported on 2/11/2022) 1 kit 0     No current facility-administered medications for this visit. Allergies   Allergen Reactions    Codeine      Nausea and vomiting          Review of Systems   Constitutional: Positive for activity change. Negative for appetite change, chills, diaphoresis, fatigue, fever and unexpected weight change. HENT: Negative for congestion, ear pain, facial swelling, hearing loss and sneezing. Eyes: Negative for photophobia, pain and redness. Respiratory: Negative for cough, shortness of breath, wheezing and stridor. Cardiovascular: Negative for chest pain, palpitations and leg swelling. Gastrointestinal: Negative for abdominal pain, nausea and vomiting. Endocrine: Negative for cold intolerance and heat intolerance.    Musculoskeletal: Positive for arthralgias, joint swelling and myalgias. Negative for back pain, gait problem, neck pain and neck stiffness. Skin: Positive for color change. Negative for pallor, rash and wound. Neurological: Negative for dizziness, facial asymmetry, weakness and numbness. Examination:  General Exam:  Vitals: Pulse 72   Resp 16   Ht 5' 7\" (1.702 m)   Wt 200 lb (90.7 kg)   SpO2 95%   BMI 31.32 kg/m²    Physical Exam  Constitutional:       General: She is not in acute distress. Appearance: Normal appearance. HENT:      Head: Normocephalic and atraumatic. Eyes:      General:         Right eye: No discharge. Left eye: No discharge. Cardiovascular:      Rate and Rhythm: Normal rate. Pulses: Normal pulses. Pulmonary:      Effort: Pulmonary effort is normal. No respiratory distress. Chest:      Chest wall: No tenderness. Musculoskeletal:         General: Swelling, tenderness and signs of injury present. No deformity. Right shoulder: Normal.      Left shoulder: Normal.      Right upper arm: Normal.      Left upper arm: Normal.      Right elbow: Normal.      Left elbow: Normal.      Right forearm: Normal. No swelling, edema, tenderness or bony tenderness. Left forearm: Normal.      Right wrist: Swelling, tenderness and bony tenderness present. No deformity, effusion, lacerations, snuff box tenderness or crepitus. Decreased range of motion. Normal pulse. Left wrist: Normal.      Right hand: Normal.      Left hand: Normal.      Cervical back: Normal range of motion. Skin:     General: Skin is warm and dry. Capillary Refill: Capillary refill takes less than 2 seconds. Findings: Bruising present. Neurological:      General: No focal deficit present. Mental Status: She is alert and oriented to person, place, and time.    Psychiatric:         Mood and Affect: Mood normal.         Behavior: Behavior normal.        RIGHT HAND EXAM:    OBSERVATION / INSPECTION:     Swelling: Minimal over dorsum of radius    Deformity: none    Discoloration: Minimal bruising over dorsum of radius    Scars: none     Atrophy: none      TENDERNESS / CREPITUS (T / C):      1st Dorsal compartment -/-    FCR -/-    FCU -/-    Ulnar Styloid -/-    Radial Styloid -/-    Palm -/-    Metacarpals -/-    Thumb CMC -/-     Thumb MCP -/-    Lesser MCPs -/-    PIP -/-    DIP -/-    Slight tenderness to palpation over dorsum of radius but no crepitance      Range of motion: ('*' = with pain)       Right hand    Full extension of fingers, full flexion to the palm of all fingers      Right Elbow     AROM (PROM)     Extension 0 deg  (5 deg)     Flexion 145 deg (145 deg)        Pronation 90 deg  (90 deg)     Supination 80 deg  (80 deg)                Left Elbow     AROM (PROM)     Extension 0 deg  (5 deg)     Flexion 145 deg (145 deg)        Pronation 90 deg  (90 deg)     Supination 80 deg  (80 deg)        Right Wrist      Extension 60 deg (65 deg) *    Flexion 60 deg (65 deg) *    Ulnar Deviation  35 deg (40 deg)    Radial Deviation 35 deg (40 deg)             Left Wrist     AROM (PROM)     Extension 80 deg (85 deg)     Flexion 80 deg (85 deg)        Ulnar Deviation  35 deg (40 deg)    Radial Deviation 35 deg (40 deg)           WRIST AND HAND EXAMINATION:    See above noted areas of tenderness. Snuff box tenderness neg         STRENGTH: ('*' = with pain)     Elbow Flexion: 5/5    Elbow Extension: 5/5    Wrist Flexion: Not tested but intact    Wrist Extension: Not tested but intact    : 5/5    Intrinsics: 5/5    EPL (Extensor pollicis longus): 5/5    Pinch Mechanism: 5/5      EXTREMITY NEURO-VASCULAR EXAMINATION: Sensation grossly intact to light touch all dermatomal regions. DTR 2+ Biceps, Triceps, BR and Negative Donna's sign. Grossly intact motor function at Elbow, Wrist and Hand. Distal pulses radial and ulnar 2+, brisk cap refill, symmetric.         Diagnostic testing:  X-ray images were reviewed by myself and discussed with the patient:  3 views of a right distal radius from outside facility demonstrate  Three views of the right wrist reveal irregularity identified of the   posterior aspect distal radius concerning for nondisplaced fracture.  There   is associated soft tissue swelling.  The carpal bones themselves are intact. Degenerative changes seen at the 1st carpometacarpal joint. Office Procedures:  No orders of the defined types were placed in this encounter. Assessment and Plan    A: Right wrist dorsal radius fracture    P:   I had a thorough conversation with the patient regarding her right wrist injury. I explained that this is more of a wrist sprain rather than true wrist fracture as there is no intra-articular extension. I explained that she should do very well at this time we will place her in an Exos splint and give her restrictions. These restrictions were both for home and work and were printed out for her. She will return to work next week on Thursday with these restrictions. She will remain in the Exos at all times unless doing hygiene or icing. She will continue to use over-the-counter anti-inflammatories for pain control. She will return in 3 weeks for new x-rays and reevaluation. All questions were answered and patient voiced understanding.     Electronically signed by [place] DO on 2/11/2022 at 9:00 AM

## 2022-02-11 NOTE — PATIENT INSTRUCTIONS
Exos brace care instructions. · Inspect the brace regularly. If it becomes cracked or develops soft spots, contact office. · Inspect skin regularly. If skin becomes red or raw, contact office. · Do not break off rough edges of the brace or trim the brace. Do not modify the brace. · Do not put foreign objects under the brace. · Do not walk on the brace or place body weight through the brace. · Keep dirt, sand, and powder away from the inside of the brace. · Keep the brace clean and DRY! · Return with any problems.     Follow up in 2 weeks

## 2022-02-11 NOTE — LETTER
[facility]  [address]  Phone: [phone]  Fax: [phone]    Charleen Lopez DO        February 11, 2022     Patient: Mason Cedeño   YOB: 1960   Date of Visit: 2/11/2022       To Whom It May Concern: It is my medical opinion that Catracho Hughes may return to work on 2/17/22 with the following restrictions: No lifting more than 1 lb. No pushing or pulling with the right arm until cleared by physician. If you have any questions or concerns, please don't hesitate to call.     Sincerely,        Charleen Lopez DO

## 2022-02-11 NOTE — PROGRESS NOTES
Patient is a 57year old female. Patient is in the office today with wrist pain. Patient states that he/she injured themselves by slipping on ice and hitting her wrist on the end of the railing. Patient states that the injury happened 2/9/22. Pain scale  6/10. She denies any previous injuries, surgeries or injections to her right wrist. She comes in today wearing the splint and sling from the ED. 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    Dominant Hand: left

## 2022-02-11 NOTE — ED PROVIDER NOTES
Emergency Department Encounter    Patient: Julian Miller  MRN: [id number]  : 1960  Date of Evaluation: 2022  ED Provider:  Fernie Giles MD    Triage Chief Complaint:   Fall (slipped on the ice this morning), Wrist Pain (right wrist pain), and Shoulder Pain (left shoulder pain)    [place]:  Julian Miller is a 57 y.o. female presenting with complaints of right wrist pain and left shoulder pain after slipping on ice. Patient states she was holding the railing outside and slipped on ice. Patient states she braced herself with her right hand and slowly fell to the ground. Patient states in trying to brace herself she began having right wrist pain. Patient states she also fell onto her left shoulder. States the pain in the right wrist is moderate to severe, constant, stabbing, worse with palpation or movement of the wrist.  Denies any motor or sensory changes. Denies pain in the proximal forearm, elbow, humerus or right shoulder. Patient states she does have some pain over the anterior left shoulder. States this is mild, worse with movement, improved with rest.  States she just can slowly rolled onto her left shoulder. Patient does take aspirin but denies any other antiplatelets or anticoagulation. Patient is absolutely positive that she did not hit her head denies loss of consciousness, amnesia, nausea vomiting. Denies neck pain, back pain, chest pain or shortness of breath, abdominal pain, recent fevers or chills, nausea vomiting, diarrhea constipation, urinary symptoms. Denies any pain in her lower extremities. Patient states this was a purely mechanical fall on ice and denies any chest pain, shortness of breath, light headedness or dizziness before the event.     ROS - see HPI, below listed is current ROS at time of my eval:  At least 14 systems reviewed, negative other than HPI    Past Medical History:   Diagnosis Date    Atrial [address] [address]     H/O cardiac catheterization 08/15/2019     normal coronaries, normal LVEF    History of cardioversion 08/15/2019    Patient is successfully cardioverted into NSR.  History of cardioversion 2019    Impression: Patient is successfully cardioverted into NSR.  History of transesophageal echocardiography (ALAN) 08/15/2019    There is no evidence of thrombus in the Left Atrial Appendage. Negative Buble Study, Mod MR, Mild TR    History of transesophageal echocardiography (ALAN) 2019     Sclerotic aortic valve. Severe TR, Mod MR.    Hyperlipidemia      Past Surgical History:   Procedure Laterality Date    APPENDECTOMY      ATRIAL ABLATION SURGERY Left 2019    Atrial fib ablation (PVI & post wall)     SECTION      CHOLECYSTECTOMY      HYSTERECTOMY       Family History   Problem Relation Age of Onset    High Cholesterol Mother     Heart Surgery Mother     Hypertension Father      Social History     Socioeconomic History    Marital status:      Spouse name: Not on file    Number of children: Not on file    Years of education: Not on file    Highest education level: Not on file   Occupational History    Not on file   Tobacco Use    Smoking status: Never Smoker    Smokeless tobacco: Never Used   Vaping Use    Vaping Use: Never used   Substance and Sexual Activity    Alcohol use: Never    Drug use: Never    Sexual activity: Yes     Partners: Male   Other Topics Concern    Not on file   Social History Narrative    Not on file     Social Determinants of Health     Financial Resource Strain:     Difficulty of Paying Living Expenses: Not on file   Food Insecurity:     Worried About Running Out of Food in the Last Year: Not on file    Dash of Food in the Last Year: Not on file   Transportation Needs:     Lack of Transportation (Medical): Not on file    Lack of Transportation (Non-Medical):  Not on file   Physical Activity:     Days of Exercise per Week: Not on file    Minutes of Exercise per --       Pain Loc --       Pain Edu? --       Excl. in [address]? --        My pulse ox interpretation is - normal    General appearance:  No acute distress. GCS 15  Skin:  Warm. Dry. Eye:  Extraocular movements intact. Pupils 3 mm reactive bilaterally  Ears, nose, mouth and throat:  Oral mucosa moist, TMs are clear, no signs of CSF leak, hemotympanum, no septal hematoma, no raccoon eyes, regan signs or signs of basilar skull fracture  Neck:  Trachea midline. Extremity: Tenderness palpation of the radial aspect of the right wrist, limited range of motion of the wrist secondary to pain, normal sensation light touch and motor function of the radial ulnar and median distribution, normal FDS, FDP functioning of all digits and normal extensor function of all digits. Patient has 2+ distal pulses, normal sensation light touch, less than 2-second cap refill no swelling. There is no tenderness palpation along the proximal forearm, elbow, humerus or shoulder. Patient describes some discomfort with range of motion of the left shoulder but no reproducible tenderness palpation of this area patient is neurovascular intact distally. Heart:  Regular rate and rhythm, normal S1 & S2, no extra heart sounds. Perfusion:  intact  Respiratory:  Lungs clear to auscultation bilaterally. Respirations nonlabored. No tenderness palpation over the chest wall  Abdominal:  Normal bowel sounds. Soft. Nontender. Non distended. Back:  No CVA tenderness to palpation, no tenderness palpation along the CT or L-spine     Neurological:  Alert and oriented times 3. No focal neuro deficits. Psychiatric:  Appropriate    I have reviewed and interpreted all of the currently available lab results from this visit (if applicable):  No results found for this visit on 02/09/22.    Radiographs (if obtained):  Radiologist's Report Reviewed:  XR WRIST RIGHT (MIN 3 VIEWS)    Result Date: 2/9/2022  EXAMINATION: 3 XRAY VIEWS OF THE RIGHT WRIST 2/9/2022 4:29 pm COMPARISON: None. HISTORY: ORDERING SYSTEM PROVIDED HISTORY: eval for fx, ttp over carpal bones dorsum. TECHNOLOGIST PROVIDED HISTORY: Reason for exam:->eval for fx, ttp over carpal bones dorsum. Reason for Exam: eval for fx, ttp over carpal bones dorsum. FINDINGS: Three views of the right wrist reveal irregularity identified of the posterior aspect distal radius concerning for nondisplaced fracture. There is associated soft tissue swelling. The carpal bones themselves are intact. Degenerative changes seen at the 1st carpometacarpal joint. Nondisplaced fracture seen of the posterior aspect of the distal radius with associated soft tissue swelling. XR HAND RIGHT (MIN 3 VIEWS)    Result Date: 2/9/2022  EXAMINATION: THREE X-RAY VIEWS OF THE RIGHT HAND 2/9/2022 4:29 pm COMPARISON: None HISTORY: ORDERING SYSTEM PROVIDED HISTORY:  Eval for fx TECHNOLOGIST PROVIDED HISTORY: Reason for Exam:  Eval for fx FINDINGS: Three views of the right hand reveal no evidence of acute bony abnormality. The cortex is intact. Degenerative changes seen at the distal interphalangeal joints of the 1st, 2nd and 3rd digits. Carpal bones are intact. No acute bony abnormality within the hand however lateral imaging reveals some irregularity of the posterior aspect of the distal radius concerning for nondisplaced fracture. Minimal associated soft tissue swelling. Degenerative changes seen within the distal interphalangeal joints with irregularity identified of the posterior aspect of the radius concerning for a nondisplaced fracture. XR SHOULDER LEFT (MIN 2 VIEWS)    Result Date: 2/9/2022  EXAMINATION: THREE X-RAY VIEWS OF THE LEFT SHOULDER 2/9/2022 4:29 pm COMPARISON: None HISTORY: ORDERING SYSTEM PROVIDED HISTORY:  Fall onto left shoulder, eval for fx. TECHNOLOGIST PROVIDED HISTORY: Reason for Exam:  Fall onto left shoulder, eval for fx.  FINDINGS: Three views of the left shoulder reveal no evidence of acute bony abnormality. The cortex is intact. Joint spaces are preserved. No soft tissue abnormality identified with spurring of the humeral head. No evidence of acute fracture or dislocation. Minimal degenerative changes seen of the acromioclavicular junction. MDM:    57-year-old female presenting after mechanical fall at home complaining of right wrist pain and left hand pain. History and be seen above. Physical exam is reassuring and patient GCS 15, lungs are clear to auscultation, abdomen soft and nontender. There is no discomfort with palpation in any other region of the right wrist.  Patient does have discomfort along the left shoulder with range of motion but no reproducible tenderness palpation in that region. Patient denies any chest pain, shortness of breath, lightheadedness or dizziness and is positive she did not hit her head does not want any further work-up for head trauma or cardiopulmonary abnormalities. X-ray of the right wrist as well as left shoulder were obtained. Left shoulder reveals minimal degenerative changes seen in the acromioclavicular junction. Right wrist.  Feels a nondisplaced fracture seen in the posterior aspect of the distal radius. Patient is neurovascular intact in all extremities. Patient placed in a sugar tong splint and given follow-up with orthopedics. Patient agreeable to plan and discharged home. Clinical Impression:  1. Other closed fracture of distal end of right radius, initial encounter    2. Essential hypertension    3. Acute pain of left shoulder      Disposition referral (if applicable):  Nicola Baldwin MD  [facility]  [phone]    In 1 week  for blood pressure check    Angle Llanos DO  [address] [facility]  [address] [place] [address]  [phone]    Schedule an appointment as soon as possible for a visit   call for appointment within one week.     Disposition medications (if applicable):  Discharge Medication List as of 2/9/2022  6:27 PM      START taking these medications    Details   ibuprofen (ADVIL;MOTRIN) 600 MG tablet Take 1 tablet by mouth 4 times daily as needed for Pain, Disp-40 tablet, R-0Normal      acetaminophen (TYLENOL) 500 MG tablet Take 1 tablet by mouth 4 times daily as needed for Pain, Disp-40 tablet, R-0Normal           ED Provider Disposition Time  DISPOSITION Decision To Discharge 02/09/2022 06:45:12 PM      Comment: Please note this report has been produced using speech recognition software and may contain errors related to that system including errors in grammar, punctuation, and spelling, as well as words and phrases that may be inappropriate. Efforts were made to edit the dictations.         Abril Schreiber MD  02/11/22 [id number]

## 2022-03-08 ENCOUNTER — OFFICE VISIT (OUTPATIENT)
Dept: ORTHOPEDIC SURGERY | Age: 62
End: 2022-03-08
Payer: COMMERCIAL

## 2022-03-08 VITALS
WEIGHT: 200 LBS | HEART RATE: 79 BPM | HEIGHT: 67 IN | OXYGEN SATURATION: 99 % | RESPIRATION RATE: 15 BRPM | BODY MASS INDEX: 31.39 KG/M2

## 2022-03-08 DIAGNOSIS — S62.101A CLOSED FRACTURE OF RIGHT WRIST, INITIAL ENCOUNTER: Primary | ICD-10-CM

## 2022-03-08 PROCEDURE — 99213 OFFICE O/P EST LOW 20 MIN: CPT | Performed by: STUDENT IN AN ORGANIZED HEALTH CARE EDUCATION/TRAINING PROGRAM

## 2022-03-08 ASSESSMENT — ENCOUNTER SYMPTOMS
FACIAL SWELLING: 0
STRIDOR: 0
COUGH: 0
COLOR CHANGE: 1
WHEEZING: 0
EYE PAIN: 0
EYE REDNESS: 0
SHORTNESS OF BREATH: 0
ABDOMINAL PAIN: 0
VOMITING: 0
PHOTOPHOBIA: 0
BACK PAIN: 0
NAUSEA: 0

## 2022-03-08 NOTE — PATIENT INSTRUCTIONS
May start winging out of the brace at home. Please wear brace at work  [employer] and elevate as needed  Tylenol or Motrin for pain  Follow up in 4 weeks    Patient Education        Hand Arthritis: Exercises  Introduction  Here are some examples of exercises for you to try. The exercises may be suggested for a condition or for rehabilitation. Start each exercise slowly. Ease off the exercises if you start to have pain. You will be told when to start these exercises and which ones will work best for you. How to do the exercises  Tendon glides    1. In this exercise, the steps follow one another to a make a continuous movement. 2. With your affected hand, point your fingers and thumb straight up. Your wrist should be relaxed, following the line of your fingers and thumb. 3. Curl your fingers so that the top two joints in them are bent, and your fingers wrap down. Your fingertips should touch or be near the base of your fingers. Your fingers will look like a hook. 4. Make a fist by bending your knuckles. Your thumb can gently rest against your index (pointing) finger. 5. Unwind your fingers slightly so that your fingertips can touch the base of your palm. Your thumb can rest against your index finger. 6. Move back to your starting position, with your fingers and thumb pointing up. 7. Repeat the series of motions 8 to 12 times. 8. Switch hands and repeat steps 1 through 6, even if only one hand is sore. Intrinsic flexion    1. Rest your affected hand on a table and bend the large joints where your fingers connect to your hand. Keep your thumb and the other joints in your fingers straight. 2. Slowly straighten your fingers. Your wrist should be relaxed, following the line of your fingers and thumb. 3. Move back to your starting position, with your hand bent. 4. Repeat 8 to 12 times. 5. Switch hands and repeat steps 1 through 4, even if only one hand is sore. Finger extension    1.  Place your affected hand flat on a table. 2. Lift and then lower one finger at a time off the table. 3. Repeat 8 to 12 times. 4. Switch hands and repeat steps 1 through 3, even if only one hand is sore. MP extension    1. Place your good hand on a table, palm up. Put your affected hand on top of your good hand with your fingers wrapped around the thumb of your good hand like you are making a fist.  2. Slowly uncurl the joints of your affected hand where your fingers connect to your hand so that only the top two joints of your fingers are bent. Your fingers will look like a hook. 3. Move back to your starting position, with your fingers wrapped around your good thumb. 4. Repeat 8 to 12 times. 5. Switch hands and repeat steps 1 through 4, even if only one hand is sore. PIP extension (with MP extension)    1. Place your good hand on a table, palm up. Put your affected hand on top of your good hand, palm up. 2. Use the thumb and fingers of your good hand to grasp below the middle joint of one finger of your affected hand. 3. Straighten the last two joints of that finger. 4. Repeat 8 to 12 times. 5. Repeat steps 1 through 4 with each finger. 6. Switch hands and repeat steps 1 through 5, even if only one hand is sore. DIP flexion    1. With your good hand, grasp one finger of your affected hand. Your thumb will be on the top side of your finger just below the joint that is closest to your fingernail. 2. Slowly bend your affected finger only at the joint closest to your fingernail. 3. Repeat 8 to 12 times. 4. Repeat steps 1 through 3 with each finger. 5. Switch hands and repeat steps 1 through 4, even if only one hand is sore. Follow-up care is a key part of your treatment and safety. Be sure to make and go to all appointments, and call your doctor if you are having problems. It's also a good idea to know your test results and keep a list of the medicines you take. Where can you learn more?   Go to [email or web address] and sign in to your [employer] account. Enter N311 in the [employer] box to learn more about \"Hand Arthritis: Exercises. \"     If you do not have an account, please click on the \"Sign Up Now\" link. Current as of: July 1, 2021               Content Version: 13.1  © 2006-2021 [employer]. Care instructions adapted under license by [facility] [place] ([facility]). If you have questions about a medical condition or this instruction, always ask your healthcare professional. [place] [address] any warranty or liability for your use of this information. Patient Education        Wrist: Exercises  Introduction  Here are some examples of exercises for you to try. The exercises may be suggested for a condition or for rehabilitation. Start each exercise slowly. Ease off the exercises if you start to have pain. You will be told when to start these exercises and which ones will work best for you. How to do the exercises  Prayer stretch    1. Start with your palms together in front of your chest just below your chin. 2. Slowly lower your hands toward your waistline, keeping your hands close to your stomach and your palms together until you feel a mild to moderate stretch under your forearms. 3. Hold for at least 15 to 30 seconds. Repeat 2 to 4 times. Wrist flexor stretch    1. Extend your arm in front of you with your palm up. 2. Bend your wrist, pointing your hand toward the floor. 3. With your other hand, gently bend your wrist farther until you feel a mild to moderate stretch in your forearm. 4. Hold for at least 15 to 30 seconds. Repeat 2 to 4 times. Wrist extensor stretch    1. Repeat steps 1 through 4 of the stretch above, but begin with your extended hand palm down. Follow-up care is a key part of your treatment and safety. Be sure to make and go to all appointments, and call your doctor if you are having problems.  It's also a good idea to know your test results and keep a list of the medicines you take. Where can you learn more? Go to [email or web address] and sign in to your [employer] account. Enter 46065 [id number] in the [facility] box to learn more about \"Wrist: Exercises. \"     If you do not have an account, please click on the \"Sign Up Now\" link. Current as of: July 1, 2021               Content Version: 13.1  © 2006-2021 [employer]. Care instructions adapted under license by [facility] ([facility]). If you have questions about a medical condition or this instruction, always ask your healthcare professional. [address] any warranty or liability for your use of this information.

## 2022-03-08 NOTE — PROGRESS NOTES
3/8/2022   Chief Complaint   Patient presents with    Follow-Up from Hospital     right wrist fracture      Update HPI: Patient is here for reevaluation of her right wrist injury. Patient has no complaints at this time and states that she has been doing well and feels that she is significantly improved. She has been working with restrictions in her Exos brace and states that this has been fine but she is concerned that lifting any restrictions would cause her wrist to become painful. She has no new injuries or concerns since last being seen. Previous HPI (2/11/2022): Dhiraj Theodore is a 57 y.o. female right hand-dominant male referred by emergency room for evaluation and treatment of a right wrist injury. Patient states this occurred days prior. She states she was walking on her porch and slipped and was holding onto the railing with her right wrist. She had a hyper flexion injury of the wrist had immediate pain over the dorsum of the wrist.    The pain is currently rated moderate. The patient was originally seen at local emergency room where an x-ray was done, a fracture of the right dorsal radius identified and she  was placed in a sugar tong splint. The patient was subsequently referred to Orthopedics for further management. The splint has remained in place and is clean and dry. She  denies prior injury to right wrist, denies associated injuries, prior injury to the upper extremity. Denies numbness, tingling, fever, chills. 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 Is affecting ADLs. Pain is 6/10 at it's worst.    Outside reports reviewed: Outside imaging reviewed    Patient's medications, allergies, past medical, surgical, social and family histories were reviewed and updated as appropriate.      No other treatment thus far    Medical History  Patient's medications, allergies, past medical, surgical, social and family histories were reviewed and updated as appropriate. Past Medical History:   Diagnosis Date    Atrial fibrillation ([address]     H/O cardiac catheterization 08/15/2019     normal coronaries, normal LVEF    History of cardioversion 08/15/2019    Patient is successfully cardioverted into NSR.  History of cardioversion 2019    Impression: Patient is successfully cardioverted into NSR.  History of transesophageal echocardiography (ALAN) 08/15/2019    There is no evidence of thrombus in the Left Atrial Appendage. Negative Buble Study, Mod MR, Mild TR    History of transesophageal echocardiography (ALAN) 2019     Sclerotic aortic valve.  Severe TR, Mod MR.    Hyperlipidemia      Past Surgical History:   Procedure Laterality Date    APPENDECTOMY      ATRIAL ABLATION SURGERY Left 2019    Atrial fib ablation (PVI & post wall)     SECTION      CHOLECYSTECTOMY      HYSTERECTOMY       Family History   Problem Relation Age of Onset    High Cholesterol Mother     Heart Surgery Mother     Hypertension Father      Social History     Socioeconomic History    Marital status:      Spouse name: Not on file    Number of children: Not on file    Years of education: Not on file    Highest education level: Not on file   Occupational History    Not on file   Tobacco Use    Smoking status: Never Smoker    Smokeless tobacco: Never Used   Vaping Use    Vaping Use: Never used   Substance and Sexual Activity    Alcohol use: Never    Drug use: Never    Sexual activity: Yes     Partners: Male   Other Topics Concern    Not on file   Social History Narrative    Not on file     Social Determinants of Health     Financial Resource Strain:     Difficulty of Paying Living Expenses: Not on file   Food Insecurity:     Worried About Running Out of Food in the Last Year: Not on file    Dash of Food in the Last Year: Not on file   Transportation Needs:  Lack of Transportation (Medical): Not on file    Lack of Transportation (Non-Medical): Not on file   Physical Activity:     Days of Exercise per Week: Not on file    Minutes of Exercise per Session: Not on file   Stress:     Feeling of Stress : Not on file   Social Connections:     Frequency of Communication with Friends and Family: Not on file    Frequency of Social Gatherings with Friends and Family: Not on file    Attends Mormonism Services: Not on file    Active Member of [address] [place] [address] or Organizations: Not on file    Attends Club or Organization Meetings: Not on file    Marital Status: Not on file   Intimate Partner Violence:     Fear of Current or Ex-Partner: Not on file    Emotionally Abused: Not on file    Physically Abused: Not on file    Sexually Abused: Not on file   Housing Stability:     Unable to Pay for Housing in the Last Year: Not on file    Number of [place] in the Last Year: Not on file    Unstable Housing in the Last Year: Not on file     Current Outpatient Medications   Medication Sig Dispense Refill    metoprolol tartrate (LOPRESSOR) 25 MG tablet Take 1 tablet by mouth 2 times daily 180 tablet 1    aspirin 81 MG tablet Take 81 mg by mouth daily      CPAP Machine MISC by Does not apply route every evening      ibuprofen (ADVIL;MOTRIN) 600 MG tablet Take 1 tablet by mouth 4 times daily as needed for Pain 40 tablet 0    acetaminophen (TYLENOL) 500 MG tablet Take 1 tablet by mouth 4 times daily as needed for Pain 40 tablet 0    methylPREDNISolone (MEDROL DOSEPACK) 4 MG tablet AS DIRECTED (Patient not taking: Reported on 2/11/2022) 1 kit 0     No current facility-administered medications for this visit. Allergies   Allergen Reactions    Codeine      Nausea and vomiting          Review of Systems   Constitutional: Positive for activity change. Negative for appetite change, chills, diaphoresis, fatigue, fever and unexpected weight change.    HENT: Negative for congestion, ear pain, facial swelling, hearing loss and sneezing. Eyes: Negative for photophobia, pain and redness. Respiratory: Negative for cough, shortness of breath, wheezing and stridor. Cardiovascular: Negative for chest pain, palpitations and leg swelling. Gastrointestinal: Negative for abdominal pain, nausea and vomiting. Endocrine: Negative for cold intolerance and heat intolerance. Musculoskeletal: Positive for arthralgias, joint swelling and myalgias. Negative for back pain, gait problem, neck pain and neck stiffness. Skin: Positive for color change. Negative for pallor, rash and wound. Neurological: Negative for dizziness, facial asymmetry, weakness and numbness. Examination:  General Exam:  Vitals: Pulse 79   Resp 15   Ht 5' 7\" (1.702 m)   Wt 200 lb (90.7 kg)   SpO2 99%   BMI 31.32 kg/m²    Physical Exam  Constitutional:       General: She is not in acute distress. Appearance: Normal appearance. HENT:      Head: Normocephalic and atraumatic. Eyes:      General:         Right eye: No discharge. Left eye: No discharge. Cardiovascular:      Rate and Rhythm: Normal rate. Pulses: Normal pulses. Pulmonary:      Effort: Pulmonary effort is normal. No respiratory distress. Chest:      Chest wall: No tenderness. Musculoskeletal:         General: Swelling, tenderness and signs of injury present. No deformity. Right shoulder: Normal.      Left shoulder: Normal.      Right upper arm: Normal.      Left upper arm: Normal.      Right elbow: Normal.      Left elbow: Normal.      Right forearm: Normal. No swelling, edema, tenderness or bony tenderness. Left forearm: Normal.      Right wrist: Swelling, tenderness and bony tenderness present. No deformity, effusion, lacerations, snuff box tenderness or crepitus. Decreased range of motion. Normal pulse.       Left wrist: Normal.      Right hand: Normal.      Left hand: Normal.      Cervical back: Normal range of motion. Skin:     General: Skin is warm and dry. Capillary Refill: Capillary refill takes less than 2 seconds. Findings: Bruising present. Neurological:      General: No focal deficit present. Mental Status: She is alert and oriented to person, place, and time. Psychiatric:         Mood and Affect: Mood normal.         Behavior: Behavior normal.        RIGHT HAND EXAM:    OBSERVATION / INSPECTION:     Swelling: None    Deformity: none    Discoloration: None    Scars: none     Atrophy: none      TENDERNESS / CREPITUS (T / C):      1st Dorsal compartment -/-    FCR -/-    FCU -/-    Ulnar Styloid -/-    Radial Styloid -/-    Palm -/-    Metacarpals -/-    Thumb CMC -/-     Thumb MCP -/-    Lesser MCPs -/-    PIP -/-    DIP -/-    Slight tenderness to palpation over dorsum of radius but no crepitance, significantly improved since last visit      Range of motion: ('*' = with pain)       Right hand    Full extension of fingers, full flexion to the palm of all fingers      Right Elbow     AROM (PROM)     Extension 0 deg  (5 deg)     Flexion 145 deg (145 deg)        Pronation 90 deg  (90 deg)     Supination 80 deg  (80 deg)                Left Elbow     AROM (PROM)     Extension 0 deg  (5 deg)     Flexion 145 deg (145 deg)        Pronation 90 deg  (90 deg)     Supination 80 deg  (80 deg)        Right Wrist      Extension 70 deg (75 deg) *but improved    Flexion 70 deg (75 deg) *but improved    Ulnar Deviation  35 deg (40 deg)    Radial Deviation 35 deg (40 deg)             Left Wrist     AROM (PROM)     Extension 80 deg (85 deg)     Flexion 80 deg (85 deg)        Ulnar Deviation  35 deg (40 deg)    Radial Deviation 35 deg (40 deg)           WRIST AND HAND EXAMINATION:    See above noted areas of tenderness.      Snuff box tenderness neg         STRENGTH: ('*' = with pain)     Elbow Flexion: 5/5    Elbow Extension: 5/5    Wrist Flexion: 5-/5    Wrist Extension: 5-/5    : 5/5    Intrinsics: 5/5    EPL (Extensor pollicis longus): 5/5    Pinch Mechanism: 5/5      EXTREMITY NEURO-VASCULAR EXAMINATION: Sensation grossly intact to light touch all dermatomal regions. DTR 2+ Biceps, Triceps, BR and Negative Donna's sign. Grossly intact motor function at Elbow, Wrist and Hand. Distal pulses radial and ulnar 2+, brisk cap refill, symmetric. Diagnostic testing:  X-ray images were reviewed by myself and discussed with the patient:  3 views of the right distal radius in a skeletally immature patient demonstrates posterior fragment with no increase displacement from previous imaging. Note intra-articular fracture seen. No additional osseous abnormalities. Continue degenerative changes seen at the first [facility] joint. Previous imaging:  3 views of a right distal radius from outside facility demonstrate  Three views of the right wrist reveal irregularity identified of the   posterior aspect distal radius concerning for nondisplaced fracture.  There   is associated soft tissue swelling.  The carpal bones themselves are intact. Degenerative changes seen at the 1st carpometacarpal joint. Office Procedures:  No orders of the defined types were placed in this encounter. Assessment and Plan    A: Right wrist dorsal radius fracture    P:   I had a thorough conversation with the patient regarding her new imaging as well as her treatment course. At this time she will continue the Exos splint at work but she can wean out of this at home. We will continue her work restrictions as well and do not want her lifting more than 5 pounds. She was given a home exercise program to work on wrist range of motion and strengthening. She will continue to ice and use anti-inflammatories for pain control. She will follow up in 4 weeks and if she is continuing to improve we will begin weaning her out of the splint at work and remove all restrictions.   We will obtain x-rays at that time only if the patient has any new complaints or issues. All questions were answered and patient voiced understanding.       Electronically signed by Bernadette Erickson DO on 3/8/2022 at 3:34 PM

## 2022-03-08 NOTE — PROGRESS NOTES
Pt comes in today for a 4 week follow up of a right radius fracture on 2/9/22. Patient states that she has not had to take pain medication in the past 4 days. She rates her pain at 0/10 currently but had 8/10 pain when she tried to close her fist for x-rays but the pain has since subsided. She denies any new falls or injuries. No issues with numbness or tingling. She came into the office wearing the exos brace that was given to her. X-rays were taken today.

## 2022-03-08 NOTE — LETTER
[facility]  [address]  Phone: [phone]  Fax: [phone]    Kenya Moralez DO        March 8, 2022     Patient: Molly Wiggins   YOB: 1960   Date of Visit: 3/8/2022       To Whom it May Concern:    Zain Mcfarland was seen in my clinic on 3/8/2022. She may return to work on 03/09/2022. Please continue with same restirctions. If you have any questions or concerns, please don't hesitate to call.     Sincerely,         Kenya Moralez DO

## 2022-04-05 ENCOUNTER — OFFICE VISIT (OUTPATIENT)
Dept: ORTHOPEDIC SURGERY | Age: 62
End: 2022-04-05
Payer: COMMERCIAL

## 2022-04-05 VITALS
RESPIRATION RATE: 16 BRPM | WEIGHT: 200 LBS | HEIGHT: 67 IN | HEART RATE: 78 BPM | OXYGEN SATURATION: 96 % | BODY MASS INDEX: 31.39 KG/M2

## 2022-04-05 DIAGNOSIS — Z09 FOLLOW UP: ICD-10-CM

## 2022-04-05 DIAGNOSIS — S62.101A CLOSED FRACTURE OF RIGHT WRIST, INITIAL ENCOUNTER: Primary | ICD-10-CM

## 2022-04-05 PROCEDURE — 99213 OFFICE O/P EST LOW 20 MIN: CPT | Performed by: STUDENT IN AN ORGANIZED HEALTH CARE EDUCATION/TRAINING PROGRAM

## 2022-04-05 ASSESSMENT — ENCOUNTER SYMPTOMS
BACK PAIN: 0
ABDOMINAL PAIN: 0
COLOR CHANGE: 0
EYE PAIN: 0
VOMITING: 0
STRIDOR: 0
WHEEZING: 0
SHORTNESS OF BREATH: 0
FACIAL SWELLING: 0
PHOTOPHOBIA: 0
NAUSEA: 0
EYE REDNESS: 0
COUGH: 0

## 2022-04-05 NOTE — LETTER
[facility]  [address]  Phone: [phone]  Fax: [phone]    Fabiola Do DO        April 5, 2022     Patient: Chantell Chambers   YOB: 1960   Date of Visit: 4/5/2022       To Whom It May Concern: It is my medical opinion that Anika Gan may return to work on 4/6/22 with no restrictions. She does not need to wear the brace. .    If you have any questions or concerns, please don't hesitate to call.     Sincerely,        Fabiola Do DO

## 2022-04-05 NOTE — PROGRESS NOTES
4/5/2022   No chief complaint on file. Updated HPI: Patient is here for reevaluation of her right wrist.  Patient states she has full active range of motion without any pain and has no complaints regarding the right distal radius. She would like to return to all activities without restrictions if possible. No new complaints or injury since last being seen. She states her pain is 0/10 and has begun weaning herself out of the brace without any issue. Previous HPI (3/8/2022): Patient is here for reevaluation of her right wrist injury. Patient has no complaints at this time and states that she has been doing well and feels that she is significantly improved. She has been working with restrictions in her Exos brace and states that this has been fine but she is concerned that lifting any restrictions would cause her wrist to become painful. She has no new injuries or concerns since last being seen. Previous HPI (2/11/2022): Alexx Lew is a 57 y.o. female right hand-dominant male referred by emergency room for evaluation and treatment of a right wrist injury. Patient states this occurred days prior. She states she was walking on her porch and slipped and was holding onto the railing with her right wrist. She had a hyper flexion injury of the wrist had immediate pain over the dorsum of the wrist.    The pain is currently rated moderate. The patient was originally seen at local emergency room where an x-ray was done, a fracture of the right dorsal radius identified and she  was placed in a sugar tong splint. The patient was subsequently referred to Orthopedics for further management. The splint has remained in place and is clean and dry. She  denies prior injury to right wrist, denies associated injuries, prior injury to the upper extremity. Denies numbness, tingling, fever, chills.     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 Is affecting ADLs. Pain is 6/10 at it's worst.    Outside reports reviewed: Outside imaging reviewed    Patient's medications, allergies, past medical, surgical, social and family histories were reviewed and updated as appropriate. No other treatment thus far    Medical History  Patient's medications, allergies, past medical, surgical, social and family histories were reviewed and updated as appropriate. Past Medical History:   Diagnosis Date    Atrial fibrillation ([address]     H/O cardiac catheterization 08/15/2019     normal coronaries, normal LVEF    History of cardioversion 08/15/2019    Patient is successfully cardioverted into NSR.  History of cardioversion 2019    Impression: Patient is successfully cardioverted into NSR.  History of transesophageal echocardiography (ALAN) 08/15/2019    There is no evidence of thrombus in the Left Atrial Appendage. Negative Buble Study, Mod MR, Mild TR    History of transesophageal echocardiography (ALAN) 2019     Sclerotic aortic valve.  Severe TR, Mod MR.    Hyperlipidemia      Past Surgical History:   Procedure Laterality Date    APPENDECTOMY      ATRIAL ABLATION SURGERY Left 2019    Atrial fib ablation (PVI & post wall)     SECTION      CHOLECYSTECTOMY      HYSTERECTOMY       Family History   Problem Relation Age of Onset    High Cholesterol Mother     Heart Surgery Mother     Hypertension Father      Social History     Socioeconomic History    Marital status:      Spouse name: Not on file    Number of children: Not on file    Years of education: Not on file    Highest education level: Not on file   Occupational History    Not on file   Tobacco Use    Smoking status: Never Smoker    Smokeless tobacco: Never Used   Vaping Use    Vaping Use: Never used   Substance and Sexual Activity    Alcohol use: Never    Drug use: Never    Sexual activity: Yes Partners: Male   Other Topics Concern    Not on file   Social History Narrative    Not on file     Social Determinants of Health     Financial Resource Strain:     Difficulty of Paying Living Expenses: Not on file   Food Insecurity:     Worried About Running Out of Food in the Last Year: Not on file    Dash of Food in the Last Year: Not on file   Transportation Needs:     Lack of Transportation (Medical): Not on file    Lack of Transportation (Non-Medical):  Not on file   Physical Activity:     Days of Exercise per Week: Not on file    Minutes of Exercise per Session: Not on file   Stress:     Feeling of Stress : Not on file   Social Connections:     Frequency of Communication with Friends and Family: Not on file    Frequency of Social Gatherings with Friends and Family: Not on file    Attends [employer] Services: Not on file    Active Member of [address] [place] [address] [employer] or Organizations: Not on file    Attends Club or Organization Meetings: Not on file    Marital Status: Not on file   Intimate Partner Violence:     Fear of Current or Ex-Partner: Not on file    Emotionally Abused: Not on file    Physically Abused: Not on file    Sexually Abused: Not on file   Housing Stability:     Unable to Pay for Housing in the Last Year: Not on file    Number of [place] in the Last Year: Not on file    Unstable Housing in the Last Year: Not on file     Current Outpatient Medications   Medication Sig Dispense Refill    ibuprofen (ADVIL;MOTRIN) 600 MG tablet Take 1 tablet by mouth 4 times daily as needed for Pain 40 tablet 0    acetaminophen (TYLENOL) 500 MG tablet Take 1 tablet by mouth 4 times daily as needed for Pain 40 tablet 0    methylPREDNISolone (MEDROL DOSEPACK) 4 MG tablet AS DIRECTED (Patient not taking: Reported on 2/11/2022) 1 kit 0    metoprolol tartrate (LOPRESSOR) 25 MG tablet Take 1 tablet by mouth 2 times daily 180 tablet 1    aspirin 81 MG tablet Take 81 mg by mouth daily      CPAP Machine MISC by Does not apply route every evening       No current facility-administered medications for this visit. Allergies   Allergen Reactions    Codeine      Nausea and vomiting          Review of Systems   Constitutional: Negative for activity change, appetite change, chills, diaphoresis, fatigue, fever and unexpected weight change. HENT: Negative for congestion, ear pain, facial swelling, hearing loss and sneezing. Eyes: Negative for photophobia, pain and redness. Respiratory: Negative for cough, shortness of breath, wheezing and stridor. Cardiovascular: Negative for chest pain, palpitations and leg swelling. Gastrointestinal: Negative for abdominal pain, nausea and vomiting. Endocrine: Negative for cold intolerance and heat intolerance. Musculoskeletal: Negative for arthralgias, back pain, gait problem, joint swelling, myalgias, neck pain and neck stiffness. Skin: Negative for color change, pallor, rash and wound. Neurological: Negative for dizziness, facial asymmetry, weakness and numbness. Examination:  General Exam:  Vitals: There were no vitals taken for this visit. Physical Exam  Constitutional:       General: She is not in acute distress. Appearance: Normal appearance. HENT:      Head: Normocephalic and atraumatic. Eyes:      General:         Right eye: No discharge. Left eye: No discharge. Cardiovascular:      Rate and Rhythm: Normal rate. Pulses: Normal pulses. Pulmonary:      Effort: Pulmonary effort is normal. No respiratory distress. Chest:      Chest wall: No tenderness. Musculoskeletal:         General: No swelling, tenderness, deformity or signs of injury. Right shoulder: Normal.      Left shoulder: Normal.      Right upper arm: Normal.      Left upper arm: Normal.      Right elbow: Normal.      Left elbow: Normal.      Right forearm: Normal. No swelling, edema, tenderness or bony tenderness. Left forearm: Normal.      Right wrist: No swelling, deformity, effusion, lacerations, bony tenderness, snuff box tenderness or crepitus. Normal range of motion. Normal pulse. Left wrist: Normal.      Right hand: Normal.      Left hand: Normal.      Cervical back: Normal range of motion. Skin:     General: Skin is warm and dry. Capillary Refill: Capillary refill takes less than 2 seconds. Findings: No bruising. Neurological:      General: No focal deficit present. Mental Status: She is alert and oriented to person, place, and time.    Psychiatric:         Mood and Affect: Mood normal.         Behavior: Behavior normal.        RIGHT HAND EXAM:    OBSERVATION / INSPECTION:     Swelling: None    Deformity: none    Discoloration: None    Scars: none     Atrophy: none      TENDERNESS / CREPITUS (T / C):      1st Dorsal compartment -/-    FCR -/-    FCU -/-    Ulnar Styloid -/-    Radial Styloid -/-    Palm -/-    Metacarpals -/-    Thumb CMC -/-     Thumb MCP -/-    Lesser MCPs -/-    PIP -/-    DIP -/-    Slight tenderness to palpation over dorsum of radius but no crepitance, significantly improved since last visit      Range of motion: ('*' = with pain)       Right hand    Full extension of fingers, full flexion to the palm of all fingers      Right Elbow     AROM (PROM)     Extension 0 deg  (5 deg)     Flexion 145 deg (145 deg)        Pronation 90 deg  (90 deg)     Supination 80 deg  (80 deg)                Left Elbow     AROM (PROM)     Extension 0 deg  (5 deg)     Flexion 145 deg (145 deg)        Pronation 90 deg  (90 deg)     Supination 80 deg  (80 deg)        Right Wrist      Extension 80 deg (85 deg)     Flexion 80 deg (85 deg)     Ulnar Deviation  35 deg (40 deg)    Radial Deviation 35 deg (40 deg)             Left Wrist     AROM (PROM)     Extension 80 deg (85 deg)     Flexion 80 deg (85 deg)        Ulnar Deviation  35 deg (40 deg)    Radial Deviation 35 deg (40 deg)           WRIST AND HAND EXAMINATION:    See above noted areas of tenderness. Snuff box tenderness neg         STRENGTH: ('*' = with pain)     Elbow Flexion: 5/5    Elbow Extension: 5/5    Wrist Flexion: 5/5    Wrist Extension: 5/5    : 5/5    Intrinsics: 5/5    EPL (Extensor pollicis longus): 5/5    Pinch Mechanism: 5/5      EXTREMITY NEURO-VASCULAR EXAMINATION: Sensation grossly intact to light touch all dermatomal regions. DTR 2+ Biceps, Triceps, BR and Negative Donna's sign. Grossly intact motor function at Elbow, Wrist and Hand. Distal pulses radial and ulnar 2+, brisk cap refill, symmetric. Diagnostic testing:  No new orthopedic imaging      Previous imaging:    3 views of the right distal radius in a skeletally immature patient demonstrates posterior fragment with no increase displacement from previous imaging. Note intra-articular fracture seen. No additional osseous abnormalities. Continue degenerative changes seen at the first [facility] [employer] joint. 3 views of a right distal radius from outside facility demonstrate  Three views of the right wrist reveal irregularity identified of the   posterior aspect distal radius concerning for nondisplaced fracture.  There   is associated soft tissue swelling.  The carpal bones themselves are intact. Degenerative changes seen at the 1st carpometacarpal joint. Office Procedures:  No orders of the defined types were placed in this encounter. Assessment and Plan    A: Right wrist dorsal radius fracture    P:   I had a thorough conversation with the patient regarding her right wrist injury as well as treatment course. We declined to do x-rays today as she is doing well and has no symptoms. She can return to all activities as tolerated and can fully remove herself in the brace. She was given a return to work without restrictions.   She can use the brace as needed if she develops episodes of pain but if she finds her self relying on the brace for several days at a time she should return to my office for reevaluation. She should continue doing ice, over-the-counter anti-inflammatories for pain control and home exercises. All questions were answered and patient voiced understanding. She will be seen as needed.     Electronically signed by Hien Barrera DO on 4/5/2022 at 2:37 PM

## 2022-04-05 NOTE — PROGRESS NOTES
Patient comes in today for a follow up of a distal radius fx on 2/9/2022. She was last seen in our office on 3/8/2022. She rates her pain at 0/10 currently. She reports that she is doing well. She has been wearing the brace at work but not at home. She feels she is ready to have work restrictions lifted. She denies any new falls or injuries.

## 2022-05-05 PROBLEM — Z09 FOLLOW UP: Status: RESOLVED | Noted: 2022-04-05 | Resolved: 2022-05-05

## 2022-06-21 ENCOUNTER — TELEPHONE (OUTPATIENT)
Dept: FAMILY MEDICINE CLINIC | Age: 62
End: 2022-06-21

## 2022-06-21 NOTE — TELEPHONE ENCOUNTER
SO please contact patient. Due to high risk status (from age) she is eligible to take paxlovid, as long as she is also within first FIVE DAYS of symptoms. There are no interactions identified in the system but please review her med list with her and corrected as needed. We also must confirm that her receiving pharmacy has Paxlovid in stock, it is a smaller pharmacy not local so will need to call them or she can pick a different pharmacy like [facility] or [facility] have consistently had it. She may use any over-the-counter cough or cold medicines if she is having symptoms and may use Tylenol or ibuprofen for discomfort or fever.

## 2022-06-21 NOTE — TELEPHONE ENCOUNTER
Patient med list reviewed with patient med list correct. Patient reports she will just take OTC products to help with relief as she does not want to hunt medication down. Patient will call back ASAP if she changes her mind.

## 2022-06-21 NOTE — TELEPHONE ENCOUNTER
Patient called and stated that she tested positive for Covid this morning. Patient states her symptoms started on 6/20/22 Patient states having headache,body aches and diarrhea. Patient denies having fever. Patient has had her vaccine and would like to know if there is something that could be called in for her?  Please advise

## 2022-07-11 ENCOUNTER — TELEPHONE (OUTPATIENT)
Dept: FAMILY MEDICINE CLINIC | Age: 62
End: 2022-07-11

## 2022-07-11 DIAGNOSIS — L72.0 WEN: Primary | ICD-10-CM

## 2022-07-11 NOTE — TELEPHONE ENCOUNTER
----- Message from Estevan Spaulding sent at 7/11/2022  4:09 PM EDT -----  Subject: Referral Request    Reason for referral request? Pt need a referral for surgeon to remove   three cyst.  Provider patient wants to be referred to(if known):     Provider Phone Number(if known):     Additional Information for Provider?   ---------------------------------------------------------------------------  --------------  [id number] [facility]    [phone]; OK to leave message on voicemail  ---------------------------------------------------------------------------  --------------

## 2022-07-11 NOTE — TELEPHONE ENCOUNTER
Patient is presumably referring to scalp cysts. She was referred last fall to Dr. Harmony Rocha. We will generate a new referral to [facility] today.

## 2022-07-20 ENCOUNTER — OFFICE VISIT (OUTPATIENT)
Dept: SURGERY | Age: 62
End: 2022-07-20
Payer: COMMERCIAL

## 2022-07-20 VITALS
DIASTOLIC BLOOD PRESSURE: 70 MMHG | HEIGHT: 67 IN | HEART RATE: 64 BPM | WEIGHT: 208.5 LBS | SYSTOLIC BLOOD PRESSURE: 118 MMHG | OXYGEN SATURATION: 97 % | BODY MASS INDEX: 32.72 KG/M2

## 2022-07-20 DIAGNOSIS — L72.9 SCALP CYST: Primary | ICD-10-CM

## 2022-07-20 PROCEDURE — 99204 OFFICE O/P NEW MOD 45 MIN: CPT | Performed by: SURGERY

## 2022-07-20 RX ORDER — SODIUM CHLORIDE 9 MG/ML
INJECTION, SOLUTION INTRAVENOUS PRN
Status: CANCELLED | OUTPATIENT
Start: 2022-07-20

## 2022-07-20 RX ORDER — SODIUM CHLORIDE 0.9 % (FLUSH) 0.9 %
5-40 SYRINGE (ML) INJECTION PRN
Status: CANCELLED | OUTPATIENT
Start: 2022-07-20

## 2022-07-20 RX ORDER — SODIUM CHLORIDE 9 MG/ML
INJECTION, SOLUTION INTRAVENOUS CONTINUOUS
Status: CANCELLED | OUTPATIENT
Start: 2022-07-20

## 2022-07-20 RX ORDER — SODIUM CHLORIDE 0.9 % (FLUSH) 0.9 %
5-40 SYRINGE (ML) INJECTION EVERY 12 HOURS SCHEDULED
Status: CANCELLED | OUTPATIENT
Start: 2022-07-20

## 2022-07-20 ASSESSMENT — PATIENT HEALTH QUESTIONNAIRE - PHQ9
SUM OF ALL RESPONSES TO PHQ QUESTIONS 1-9: 0
1. LITTLE INTEREST OR PLEASURE IN DOING THINGS: 0
2. FEELING DOWN, DEPRESSED OR HOPELESS: 0
SUM OF ALL RESPONSES TO PHQ QUESTIONS 1-9: 0
SUM OF ALL RESPONSES TO PHQ9 QUESTIONS 1 & 2: 0
SUM OF ALL RESPONSES TO PHQ QUESTIONS 1-9: 0
SUM OF ALL RESPONSES TO PHQ QUESTIONS 1-9: 0

## 2022-07-20 ASSESSMENT — ENCOUNTER SYMPTOMS
ABDOMINAL DISTENTION: 0
BACK PAIN: 0
STRIDOR: 0
COUGH: 0
ABDOMINAL PAIN: 0
BLOOD IN STOOL: 0
SORE THROAT: 0
SHORTNESS OF BREATH: 0
VOMITING: 0
NAUSEA: 0
COLOR CHANGE: 0
TROUBLE SWALLOWING: 0
CHOKING: 0

## 2022-07-20 NOTE — H&P
Stress: Not on file   Social Connections: Not on file   Intimate Partner Violence: Not on file   Housing Stability: Not on file       Current Outpatient Medications   Medication Sig Dispense Refill    ibuprofen (ADVIL;MOTRIN) 600 MG tablet Take 1 tablet by mouth 4 times daily as needed for Pain 40 tablet 0    acetaminophen (TYLENOL) 500 MG tablet Take 1 tablet by mouth 4 times daily as needed for Pain 40 tablet 0    metoprolol tartrate (LOPRESSOR) 25 MG tablet Take 1 tablet by mouth 2 times daily 180 tablet 1    aspirin 81 MG tablet Take 81 mg by mouth daily      CPAP Machine MISC by Does not apply route every evening       No current facility-administered medications for this visit. Allergies   Allergen Reactions    Codeine      Nausea and vomiting        Review of Systems:         Review of Systems   Constitutional:  Negative for chills, fatigue, fever and unexpected weight change. HENT:  Negative for sore throat and trouble swallowing. Respiratory:  Negative for cough, choking, shortness of breath and stridor. Cardiovascular:  Negative for chest pain, palpitations and leg swelling. Gastrointestinal:  Negative for abdominal distention, abdominal pain, blood in stool, nausea and vomiting. Musculoskeletal:  Negative for back pain, gait problem and joint swelling. Skin:  Negative for color change, rash and wound. Allergic/Immunologic: Negative for immunocompromised state. Neurological:  Negative for dizziness, speech difficulty, weakness and light-headedness. Hematological:  Negative for adenopathy. Does not bruise/bleed easily. Psychiatric/Behavioral:  Negative for agitation and confusion. The patient is not nervous/anxious.         OBJECTIVE:  Physical Exam:    /70 (Site: Right Upper Arm, Position: Sitting, Cuff Size: Medium Adult)   Pulse 64   Ht 5' 7\" (1.702 m)   Wt 208 lb 8 oz (94.6 kg)   SpO2 97%   BMI 32.66 kg/m²      Physical Exam  General: No acute distress  Head: Pilar cyst x4. Largest is approximately 2.5 cm. Smallest is less than 0.5 cm. Mobile subcutaneous pilar cyst.  No signs of infection. Neck: No JVD, supple  Lungs: Chest rise equal bilaterally  Cardiac: Appears well perfused   Abdomen: Soft, nontender, nondistended, no rebound or guarding  Extremities, no edema, cyanosis, or clubbing  Skin: No rashes or breakdown  Neurologic: cranial nerves II - XII grossly intact    ASSESSMENT:  1. Scalp cyst    Multiple      PLAN:    We will plan for excision. Discussed risk and benefits including risk of anesthesia including cardiopulmonary compromise, bleeding, infection, wound healing problems, need for additional procedure. Patient states understanding and agrees to proceed with procedure. Orders Placed This Encounter   Procedures    Initiate PAT Protocol        No orders of the defined types were placed in this encounter. Follow Up:  No follow-ups on file.       Axel Coffman DO

## 2022-07-25 ENCOUNTER — TELEPHONE (OUTPATIENT)
Dept: SURGERY | Age: 62
End: 2022-07-25

## 2022-09-09 NOTE — TELEPHONE ENCOUNTER
Pt called wanting to cancel. Pt stated she will need to wait until next year. Pt aware she will need to call and make appt with Dr Simone Iglesias to reschedule.

## 2023-08-29 ENCOUNTER — TELEPHONE (OUTPATIENT)
Dept: FAMILY MEDICINE CLINIC | Age: 63
End: 2023-08-29

## 2023-08-29 NOTE — TELEPHONE ENCOUNTER
Patient called and wanted to know if she could get a referral for sleep study. I advised the patient that provider would not be able to do that since she has not been seen in office since 10/14/21. Patient stated that she does not come into the office unless she has to. I advised that I could schedule appointment, she stated that she will have to call back to schedule appointment.

## 2023-09-28 ENCOUNTER — HOSPITAL ENCOUNTER (OUTPATIENT)
Age: 63
Discharge: HOME | End: 2023-09-28
Payer: COMMERCIAL

## 2023-09-28 ENCOUNTER — OFFICE VISIT (OUTPATIENT)
Dept: FAMILY MEDICINE CLINIC | Age: 63
End: 2023-09-28
Payer: COMMERCIAL

## 2023-09-28 VITALS
HEART RATE: 70 BPM | DIASTOLIC BLOOD PRESSURE: 80 MMHG | WEIGHT: 210.2 LBS | OXYGEN SATURATION: 97 % | HEIGHT: 67 IN | SYSTOLIC BLOOD PRESSURE: 128 MMHG | BODY MASS INDEX: 32.99 KG/M2

## 2023-09-28 DIAGNOSIS — Z13.9 SCREENING DUE: ICD-10-CM

## 2023-09-28 DIAGNOSIS — I10 HYPERTENSION, UNSPECIFIED TYPE: ICD-10-CM

## 2023-09-28 DIAGNOSIS — G47.30 SLEEP APNEA, UNSPECIFIED TYPE: Primary | ICD-10-CM

## 2023-09-28 LAB
ALBUMIN SERPL-MCNC: 4.4 GM/DL (ref 3.4–5)
ALP BLD-CCNC: 89 IU/L (ref 40–128)
ALT SERPL-CCNC: 26 U/L (ref 10–40)
ANION GAP SERPL CALCULATED.3IONS-SCNC: 13 MMOL/L (ref 4–16)
AST SERPL-CCNC: 22 IU/L (ref 15–37)
BASOPHILS ABSOLUTE: 0 K/CU MM
BASOPHILS RELATIVE PERCENT: 0.4 % (ref 0–1)
BILIRUB SERPL-MCNC: 0.5 MG/DL (ref 0–1)
BUN SERPL-MCNC: 18 MG/DL (ref 6–23)
CALCIUM SERPL-MCNC: 9.5 MG/DL (ref 8.3–10.6)
CHLORIDE BLD-SCNC: 102 MMOL/L (ref 99–110)
CHOLEST SERPL-MCNC: 224 MG/DL
CO2: 25 MMOL/L (ref 21–32)
CREAT SERPL-MCNC: 0.7 MG/DL (ref 0.6–1.1)
DIFFERENTIAL TYPE: ABNORMAL
EOSINOPHILS ABSOLUTE: 0.2 K/CU MM
EOSINOPHILS RELATIVE PERCENT: 2.5 % (ref 0–3)
GFR SERPL CREATININE-BSD FRML MDRD: >60 ML/MIN/1.73M2
GLUCOSE SERPL-MCNC: 109 MG/DL (ref 70–99)
HCT VFR BLD CALC: 44.6 % (ref 37–47)
HDLC SERPL-MCNC: 54 MG/DL
HEMOGLOBIN: 13.9 GM/DL (ref 12.5–16)
IMMATURE NEUTROPHIL %: 0.3 % (ref 0–0.43)
LDLC SERPL CALC-MCNC: 131 MG/DL
LYMPHOCYTES ABSOLUTE: 1.8 K/CU MM
LYMPHOCYTES RELATIVE PERCENT: 26.1 % (ref 24–44)
MCH RBC QN AUTO: 29.3 PG (ref 27–31)
MCHC RBC AUTO-ENTMCNC: 31.2 % (ref 32–36)
MCV RBC AUTO: 93.9 FL (ref 78–100)
MONOCYTES ABSOLUTE: 0.6 K/CU MM
MONOCYTES RELATIVE PERCENT: 8.6 % (ref 0–4)
NUCLEATED RBC %: 0 %
PDW BLD-RTO: 11.9 % (ref 11.7–14.9)
PLATELET # BLD: 264 K/CU MM (ref 140–440)
PMV BLD AUTO: 9.8 FL (ref 7.5–11.1)
POTASSIUM SERPL-SCNC: 4.5 MMOL/L (ref 3.5–5.1)
RBC # BLD: 4.75 M/CU MM (ref 4.2–5.4)
SEGMENTED NEUTROPHILS ABSOLUTE COUNT: 4.3 K/CU MM
SEGMENTED NEUTROPHILS RELATIVE PERCENT: 62.1 % (ref 36–66)
SODIUM BLD-SCNC: 140 MMOL/L (ref 135–145)
TOTAL IMMATURE NEUTOROPHIL: 0.02 K/CU MM
TOTAL NUCLEATED RBC: 0 K/CU MM
TOTAL PROTEIN: 6.9 GM/DL (ref 6.4–8.2)
TRIGL SERPL-MCNC: 195 MG/DL
TSH SERPL DL<=0.005 MIU/L-ACNC: 1.67 UIU/ML (ref 0.27–4.2)
WBC # BLD: 6.9 K/CU MM (ref 4–10.5)

## 2023-09-28 PROCEDURE — 36415 COLL VENOUS BLD VENIPUNCTURE: CPT

## 2023-09-28 PROCEDURE — 99213 OFFICE O/P EST LOW 20 MIN: CPT | Performed by: NURSE PRACTITIONER

## 2023-09-28 PROCEDURE — 90471 IMMUNIZATION ADMIN: CPT | Performed by: NURSE PRACTITIONER

## 2023-09-28 PROCEDURE — 3074F SYST BP LT 130 MM HG: CPT | Performed by: NURSE PRACTITIONER

## 2023-09-28 PROCEDURE — 1123F ACP DISCUSS/DSCN MKR DOCD: CPT | Performed by: NURSE PRACTITIONER

## 2023-09-28 PROCEDURE — 84443 ASSAY THYROID STIM HORMONE: CPT

## 2023-09-28 PROCEDURE — 3079F DIAST BP 80-89 MM HG: CPT | Performed by: NURSE PRACTITIONER

## 2023-09-28 PROCEDURE — 85025 COMPLETE CBC W/AUTO DIFF WBC: CPT

## 2023-09-28 PROCEDURE — 80061 LIPID PANEL: CPT

## 2023-09-28 PROCEDURE — 80053 COMPREHEN METABOLIC PANEL: CPT

## 2023-09-28 PROCEDURE — 90694 VACC AIIV4 NO PRSRV 0.5ML IM: CPT | Performed by: NURSE PRACTITIONER

## 2023-09-28 SDOH — ECONOMIC STABILITY: FOOD INSECURITY: WITHIN THE PAST 12 MONTHS, THE FOOD YOU BOUGHT JUST DIDN'T LAST AND YOU DIDN'T HAVE MONEY TO GET MORE.: NEVER TRUE

## 2023-09-28 SDOH — ECONOMIC STABILITY: FOOD INSECURITY: WITHIN THE PAST 12 MONTHS, YOU WORRIED THAT YOUR FOOD WOULD RUN OUT BEFORE YOU GOT MONEY TO BUY MORE.: NEVER TRUE

## 2023-09-28 SDOH — ECONOMIC STABILITY: INCOME INSECURITY: HOW HARD IS IT FOR YOU TO PAY FOR THE VERY BASICS LIKE FOOD, HOUSING, MEDICAL CARE, AND HEATING?: NOT HARD AT ALL

## 2023-09-28 SDOH — ECONOMIC STABILITY: HOUSING INSECURITY
IN THE LAST 12 MONTHS, WAS THERE A TIME WHEN YOU DID NOT HAVE A STEADY PLACE TO SLEEP OR SLEPT IN A SHELTER (INCLUDING NOW)?: NO

## 2023-09-28 ASSESSMENT — PATIENT HEALTH QUESTIONNAIRE - PHQ9
SUM OF ALL RESPONSES TO PHQ QUESTIONS 1-9: 0
1. LITTLE INTEREST OR PLEASURE IN DOING THINGS: 0
SUM OF ALL RESPONSES TO PHQ QUESTIONS 1-9: 0
SUM OF ALL RESPONSES TO PHQ QUESTIONS 1-9: 0
2. FEELING DOWN, DEPRESSED OR HOPELESS: 0
SUM OF ALL RESPONSES TO PHQ QUESTIONS 1-9: 0
SUM OF ALL RESPONSES TO PHQ9 QUESTIONS 1 & 2: 0

## 2023-09-28 NOTE — PATIENT INSTRUCTIONS
I have sent a referral for you to the sleep study lab  They will contact you for an appointment.   Schedule to follow up with Dr. Linda Lyon for a routine recheck

## 2023-09-28 NOTE — RESULT ENCOUNTER NOTE
Please advise patient her lab work is normal with the exception of her cholesterol, which is slightly higher form the last time it was checked. High cholesterol can increase risk for vascular diseases including stroke and heart attack. Recommend lean meats, increasing fruits, veggies, and almonds as a snack (10 per day). Also increase routine exercise as tolerated. Follow up with office if any concerns.   Thank you

## 2023-09-28 NOTE — PROGRESS NOTES
1     Standing Expiration Date:   9/28/2024    LIPID PANEL     Standing Status:   Future     Number of Occurrences:   1     Standing Expiration Date:   9/28/2024     Order Specific Question:   Is Patient Fasting?/# of Hours     Answer:   15     Order Specific Question:   Has the patient fasted? Answer:   Yes    TSH     Standing Status:   Future     Number of Occurrences:   1     Standing Expiration Date:   9/28/2024    [address]     Referral Priority:   Routine     Referral Type:   Eval and Treat     Referral Reason:   Specialty Services Required     Requested Specialty:   Sleep Center     Number of Visits Requested:   1     Current Outpatient Medications   Medication Sig Dispense Refill    ibuprofen (ADVIL;MOTRIN) 600 MG tablet Take 1 tablet by mouth 4 times daily as needed for Pain 40 tablet 0    acetaminophen (TYLENOL) 500 MG tablet Take 1 tablet by mouth 4 times daily as needed for Pain 40 tablet 0    metoprolol tartrate (LOPRESSOR) 25 MG tablet Take 1 tablet by mouth 2 times daily 180 tablet 1    aspirin 81 MG tablet Take 1 tablet by mouth daily      CPAP Machine MISC by Does not apply route every evening       No current facility-administered medications for this visit. Return in about 2 months (around 11/28/2023), or recheck chronic condition management, for hypertension. Hannah Lowe, FNP DNP, FNP-C    Return for new or worsening symptoms or any concerns as needed.

## 2023-11-29 ENCOUNTER — HOSPITAL ENCOUNTER (OUTPATIENT)
Dept: SLEEP CENTER | Age: 63
Discharge: HOME | End: 2023-11-29
Payer: MEDICARE

## 2023-11-29 VITALS
WEIGHT: 200 LBS | HEIGHT: 67 IN | DIASTOLIC BLOOD PRESSURE: 69 MMHG | HEART RATE: 89 BPM | SYSTOLIC BLOOD PRESSURE: 145 MMHG | BODY MASS INDEX: 31.39 KG/M2 | OXYGEN SATURATION: 96 %

## 2023-11-29 DIAGNOSIS — G47.33 OSA (OBSTRUCTIVE SLEEP APNEA): ICD-10-CM

## 2023-11-29 DIAGNOSIS — G47.10 HYPERSOMNIA: ICD-10-CM

## 2023-11-29 DIAGNOSIS — E66.9 OBESITY (BMI 30-39.9): ICD-10-CM

## 2023-11-29 PROCEDURE — 99211 OFF/OP EST MAY X REQ PHY/QHP: CPT

## 2023-11-29 PROCEDURE — 99204 OFFICE O/P NEW MOD 45 MIN: CPT | Performed by: INTERNAL MEDICINE

## 2023-11-29 ASSESSMENT — SLEEP AND FATIGUE QUESTIONNAIRES
HOW LIKELY ARE YOU TO NOD OFF OR FALL ASLEEP WHILE WATCHING TV: 1
HOW LIKELY ARE YOU TO NOD OFF OR FALL ASLEEP WHILE SITTING AND READING: 1
HOW LIKELY ARE YOU TO NOD OFF OR FALL ASLEEP WHILE LYING DOWN TO REST IN THE AFTERNOON WHEN CIRCUMSTANCES PERMIT: 1
HOW LIKELY ARE YOU TO NOD OFF OR FALL ASLEEP WHILE SITTING QUIETLY AFTER LUNCH WITHOUT ALCOHOL: 0
ESS TOTAL SCORE: 5
HOW LIKELY ARE YOU TO NOD OFF OR FALL ASLEEP IN A CAR, WHILE STOPPED FOR A FEW MINUTES IN TRAFFIC: 0
NECK CIRCUMFERENCE (INCHES): 15.75
HOW LIKELY ARE YOU TO NOD OFF OR FALL ASLEEP WHILE WATCHING TV: 1
HOW LIKELY ARE YOU TO NOD OFF OR FALL ASLEEP WHILE SITTING AND READING: 1
HOW LIKELY ARE YOU TO NOD OFF OR FALL ASLEEP WHILE SITTING INACTIVE IN A PUBLIC PLACE: 1
HOW LIKELY ARE YOU TO NOD OFF OR FALL ASLEEP WHILE SITTING QUIETLY AFTER LUNCH WITHOUT ALCOHOL: 0
NECK CIRCUMFERENCE (INCHES): 15.75
HOW LIKELY ARE YOU TO NOD OFF OR FALL ASLEEP WHILE SITTING AND TALKING TO SOMEONE: 0
ESS TOTAL SCORE: 4
HOW LIKELY ARE YOU TO NOD OFF OR FALL ASLEEP WHILE SITTING INACTIVE IN A PUBLIC PLACE: 1
HOW LIKELY ARE YOU TO NOD OFF OR FALL ASLEEP WHEN YOU ARE A PASSENGER IN A CAR FOR AN HOUR WITHOUT A BREAK: 0
HOW LIKELY ARE YOU TO NOD OFF OR FALL ASLEEP WHILE SITTING AND TALKING TO SOMEONE: 0
HOW LIKELY ARE YOU TO NOD OFF OR FALL ASLEEP IN A CAR, WHILE STOPPED FOR A FEW MINUTES IN TRAFFIC: 0
HOW LIKELY ARE YOU TO NOD OFF OR FALL ASLEEP WHEN YOU ARE A PASSENGER IN A CAR FOR AN HOUR WITHOUT A BREAK: 2
HOW LIKELY ARE YOU TO NOD OFF OR FALL ASLEEP WHILE LYING DOWN TO REST IN THE AFTERNOON WHEN CIRCUMSTANCES PERMIT: 0

## 2023-11-29 NOTE — CONSULTS
Hilda MCBRIDE, Declan Hopper MD, [address] [facility] MD, [address] DO      [address], [address] [address] Boomer Artie: [phone]  F: [phone]     Subjective:     Patient ID: Merrill Cedillo is a 70 y.o. female, referred to the sleep center for   Chief Complaint   Patient presents with    Sleep Apnea   . Referring physician:  ANGEL Lockhart     History:has been referred for the ANISA. She has been diagnosed with severe ANISA about 5 years ago at [facility]. She is on a CPAP of 8 cm h20. She is using it every night about 6 to 7 hours. She says that it is helping her. She has a nasal pillows mask. She has no loss of weight. She is not sleepy or tired during the day time.  Her 2 week download data showed a residual AHI is 1.9 and leak is 30.3 L/min    Symptoms:   []  Snoring                                                                []  Dry Mouth  []  Choking                                                               []  Morning Headaches  []  Gasping for Air                                                   []  Trouble Falling asleep  []  Tired during the daytime                                    []  Trouble Staying Asleep  []  Tired when you wake up                                    []  Weight Gain in Last 5 Years  []  Wake up frequently at night                                []  Weight Loss in Last 5 Years  []  Shortness Of Breath                                            []  Shift Worker  []  Coughing                                                             []  Smoker (Previous or Current)  []  Chest Pain                                                         []  Anxiety  []  Trouble keeping your legs still at night                 []  Depression  []  Kicking your legs in your sleep                            []  Insomnia     []  Palpitation  []  Stop breathing      []

## 2023-12-07 ENCOUNTER — TELEPHONE (OUTPATIENT)
Dept: PULMONOLOGY | Age: 63
End: 2023-12-07

## 2023-12-12 ENCOUNTER — TELEPHONE (OUTPATIENT)
Dept: PULMONOLOGY | Age: 63
End: 2023-12-12

## 2024-07-30 ENCOUNTER — TELEPHONE (OUTPATIENT)
Dept: FAMILY MEDICINE CLINIC | Age: 64
End: 2024-07-30

## 2024-07-30 NOTE — TELEPHONE ENCOUNTER
Pt stated 3 wks ago she was standing on a stool and the stool came back and hit her shinbone. Pt stated there was a deep cut and it seems to be healing slowly. She stated it is now no bigger than the tip of her finger but there is a little yellow in the center and it won't go away. She said it isn't warm to the touch and its just a little red around the wound. She was been washing it and putting bacitracin on it. She only covers it when going out. Pt doesn't have MyChart, she doesn't like to get on the computer and doesn't have any internet. I offered the pt the number for the Walk In but pt is leaving for vacation. Pt wants to use the bandaids that have Hydrocolloid on them but wasn't sure if they would help.  Please advise

## 2024-07-31 NOTE — TELEPHONE ENCOUNTER
Called and spoke with patient and she voiced understanding and stated that she woke up this morning to it was raised and had a white spot and puss coming out and not red anymore. Will leave bandage on till Sunday if it will stay. She stated Thank you Dr. Branch

## 2024-10-07 ENCOUNTER — OFFICE VISIT (OUTPATIENT)
Dept: FAMILY MEDICINE CLINIC | Age: 64
End: 2024-10-07
Payer: MEDICARE

## 2024-10-07 VITALS
HEART RATE: 75 BPM | BODY MASS INDEX: 32.65 KG/M2 | OXYGEN SATURATION: 95 % | DIASTOLIC BLOOD PRESSURE: 90 MMHG | WEIGHT: 208 LBS | SYSTOLIC BLOOD PRESSURE: 130 MMHG | HEIGHT: 67 IN

## 2024-10-07 DIAGNOSIS — Z23 NEED FOR IMMUNIZATION AGAINST INFLUENZA: ICD-10-CM

## 2024-10-07 DIAGNOSIS — G47.33 OSA (OBSTRUCTIVE SLEEP APNEA): ICD-10-CM

## 2024-10-07 DIAGNOSIS — Z28.21 23-POLYVALENT PNEUMOCOCCAL POLYSACCHARIDE VACCINE DECLINED: ICD-10-CM

## 2024-10-07 DIAGNOSIS — Z86.79 HISTORY OF ATRIAL FIBRILLATION: ICD-10-CM

## 2024-10-07 DIAGNOSIS — Z53.20 SCREENING MAMMOGRAPHY DECLINED: ICD-10-CM

## 2024-10-07 DIAGNOSIS — Z00.00 WELCOME TO MEDICARE PREVENTIVE VISIT: Primary | ICD-10-CM

## 2024-10-07 DIAGNOSIS — I10 PRIMARY HYPERTENSION: ICD-10-CM

## 2024-10-07 DIAGNOSIS — Z12.11 SCREEN FOR COLON CANCER: ICD-10-CM

## 2024-10-07 DIAGNOSIS — M75.41 IMPINGEMENT SYNDROME OF RIGHT SHOULDER: ICD-10-CM

## 2024-10-07 DIAGNOSIS — I51.9 LV DYSFUNCTION: ICD-10-CM

## 2024-10-07 DIAGNOSIS — Z86.79 S/P ABLATION OF ATRIAL FIBRILLATION: ICD-10-CM

## 2024-10-07 DIAGNOSIS — E78.2 MIXED HYPERLIPIDEMIA: ICD-10-CM

## 2024-10-07 DIAGNOSIS — Z98.890 S/P ABLATION OF ATRIAL FIBRILLATION: ICD-10-CM

## 2024-10-07 DIAGNOSIS — Z99.89 CPAP (CONTINUOUS POSITIVE AIRWAY PRESSURE) DEPENDENCE: ICD-10-CM

## 2024-10-07 DIAGNOSIS — R73.9 HYPERGLYCEMIA: ICD-10-CM

## 2024-10-07 PROBLEM — I48.91 ATRIAL FIBRILLATION (HCC): Status: RESOLVED | Noted: 2019-04-17 | Resolved: 2024-10-07

## 2024-10-07 LAB
ALBUMIN SERPL-MCNC: 4.4 G/DL (ref 3.4–5)
ALBUMIN/GLOB SERPL: 1.6 {RATIO} (ref 1.1–2.2)
ALP SERPL-CCNC: 91 U/L (ref 40–129)
ALT SERPL-CCNC: 23 U/L (ref 10–40)
ANION GAP SERPL CALCULATED.3IONS-SCNC: 9 MMOL/L (ref 3–16)
AST SERPL-CCNC: 20 U/L (ref 15–37)
BILIRUB SERPL-MCNC: 0.4 MG/DL (ref 0–1)
BUN SERPL-MCNC: 16 MG/DL (ref 7–20)
CALCIUM SERPL-MCNC: 9.9 MG/DL (ref 8.3–10.6)
CHLORIDE SERPL-SCNC: 103 MMOL/L (ref 99–110)
CHOLEST SERPL-MCNC: 233 MG/DL (ref 0–199)
CO2 SERPL-SCNC: 28 MMOL/L (ref 21–32)
CREAT SERPL-MCNC: 0.7 MG/DL (ref 0.6–1.2)
GFR SERPLBLD CREATININE-BSD FMLA CKD-EPI: >90 ML/MIN/{1.73_M2}
GLUCOSE P FAST SERPL-MCNC: 108 MG/DL (ref 70–99)
HDLC SERPL-MCNC: 53 MG/DL (ref 40–60)
LDL CHOLESTEROL: 142 MG/DL
POTASSIUM SERPL-SCNC: 4.8 MMOL/L (ref 3.5–5.1)
PROT SERPL-MCNC: 7.1 G/DL (ref 6.4–8.2)
SODIUM SERPL-SCNC: 140 MMOL/L (ref 136–145)
TRIGL SERPL-MCNC: 188 MG/DL (ref 0–150)
VLDLC SERPL CALC-MCNC: 38 MG/DL

## 2024-10-07 PROCEDURE — 90653 IIV ADJUVANT VACCINE IM: CPT | Performed by: FAMILY MEDICINE

## 2024-10-07 PROCEDURE — G0402 INITIAL PREVENTIVE EXAM: HCPCS | Performed by: FAMILY MEDICINE

## 2024-10-07 PROCEDURE — 3080F DIAST BP >= 90 MM HG: CPT | Performed by: FAMILY MEDICINE

## 2024-10-07 PROCEDURE — 36415 COLL VENOUS BLD VENIPUNCTURE: CPT | Performed by: FAMILY MEDICINE

## 2024-10-07 PROCEDURE — 99214 OFFICE O/P EST MOD 30 MIN: CPT | Performed by: FAMILY MEDICINE

## 2024-10-07 PROCEDURE — G0008 ADMIN INFLUENZA VIRUS VAC: HCPCS | Performed by: FAMILY MEDICINE

## 2024-10-07 PROCEDURE — 1123F ACP DISCUSS/DSCN MKR DOCD: CPT | Performed by: FAMILY MEDICINE

## 2024-10-07 PROCEDURE — 3075F SYST BP GE 130 - 139MM HG: CPT | Performed by: FAMILY MEDICINE

## 2024-10-07 ASSESSMENT — PATIENT HEALTH QUESTIONNAIRE - PHQ9
SUM OF ALL RESPONSES TO PHQ QUESTIONS 1-9: 0
SUM OF ALL RESPONSES TO PHQ9 QUESTIONS 1 & 2: 0
SUM OF ALL RESPONSES TO PHQ QUESTIONS 1-9: 0
2. FEELING DOWN, DEPRESSED OR HOPELESS: NOT AT ALL
SUM OF ALL RESPONSES TO PHQ QUESTIONS 1-9: 0
1. LITTLE INTEREST OR PLEASURE IN DOING THINGS: NOT AT ALL
SUM OF ALL RESPONSES TO PHQ QUESTIONS 1-9: 0

## 2024-10-07 ASSESSMENT — ENCOUNTER SYMPTOMS
COUGH: 0
BACK PAIN: 0
SHORTNESS OF BREATH: 0
CHEST TIGHTNESS: 0
ABDOMINAL PAIN: 0
WHEEZING: 0

## 2024-10-07 ASSESSMENT — LIFESTYLE VARIABLES
HOW MANY STANDARD DRINKS CONTAINING ALCOHOL DO YOU HAVE ON A TYPICAL DAY: PATIENT DOES NOT DRINK
HOW OFTEN DO YOU HAVE A DRINK CONTAINING ALCOHOL: NEVER

## 2024-10-07 NOTE — PROGRESS NOTES
Medicare Annual Wellness Visit    Jenny Iqbal is here for Medicare AWV (Patient is here primarily scheduled for annual wellness visit), Health Maintenance (Pt refuses mammogram -- agrees to HD deny thought), Atrial Fibrillation (Patient with history of atrial fibrillation and ablation of same, should be taking baby aspirin and metoprolol tartrate 25 twice daily.  No recent cardiology visit noted.), Hypertension (Blood pressure is modestly elevated today despite metoprolol medication), Shoulder Pain (Right shoulder pain x few months from working with [employer], LEFT HAND DOMINANT), and Sleep Apnea (Sleeps much better with CPAP, saw provider last year and she is doing well)         Subjective     Chief Complaint   Patient presents with    Medicare AWV     Patient is here primarily scheduled for annual wellness visit    Health Maintenance     Pt refuses mammogram -- agrees to HD fluy thought    Atrial Fibrillation     Patient with history of atrial fibrillation and ablation of same, should be taking baby aspirin and metoprolol tartrate 25 twice daily.  No recent cardiology visit noted.    Hypertension     Blood pressure is modestly elevated today despite metoprolol medication    Shoulder Pain     Right shoulder pain x few months from working with [employer], LEFT HAND DOMINANT    Sleep Apnea     Sleeps much better with CPAP, saw provider last year and she is doing well        Patient's complete Health Risk Assessment and screening values have been reviewed and are found in Flowsheets. The following problems were reviewed today and where indicated follow up appointments were made and/or referrals ordered.    Findings noted upon review of Medicare Annual Wellness Visit [facility] Report:    Positive Risk Factor Screenings with Interventions:                Inactivity:  On average, how many days per week do you engage in moderate to strenuous exercise (like a brisk walk)?: 2 days (!) Abnormal  On average, how many minutes 
daily      CPAP Machine MISC by Does not apply route every evening         Tobacco use history updated.   Social History     Tobacco Use   Smoking Status Never   Smokeless Tobacco Never        Nursing note reviewed.    Vitals:    10/07/24 0834   BP: (!) 130/90   Site: Left Upper Arm   Position: Sitting   Cuff Size: Medium Adult   Pulse: 75   SpO2: 95%   Weight: 94.3 kg (208 lb)   Height: 1.702 m (5' 7\")          BP Readings from Last 3 Encounters:   10/07/24 (!) 130/90   11/29/23 (!) 145/69   09/28/23 128/80     Wt Readings from Last 3 Encounters:   10/07/24 94.3 kg (208 lb)   11/29/23 90.7 kg (200 lb)   09/28/23 95.3 kg (210 lb 3.2 oz)     Body mass index is 32.58 kg/m².    No results found for this visit on 10/07/24.      Physical Exam  Vitals and nursing note reviewed.   Constitutional:       General: She is not in acute distress.     Appearance: She is well-developed. She is not diaphoretic.   HENT:      Head: Normocephalic.   Eyes:      General:         Right eye: No discharge.         Left eye: No discharge.      Conjunctiva/sclera: Conjunctivae normal.      Pupils: Pupils are equal, round, and reactive to light.   Cardiovascular:      Rate and Rhythm: Normal rate and regular rhythm.      Heart sounds: Normal heart sounds. No murmur heard.  Pulmonary:      Effort: Pulmonary effort is normal. No respiratory distress.      Breath sounds: Normal breath sounds. No wheezing or rales.   Musculoskeletal:      Cervical back: Normal range of motion.      Comments: Right shoulder with pain with external rotation against resistance, some pain reaching behind back   Skin:     General: Skin is warm and dry.   Neurological:      Mental Status: She is alert and oriented to person, place, and time.   Psychiatric:         Behavior: Behavior normal.           _________________________________________________  Assessment:     1. Welcome to Medicare preventive visit  2. Need for immunization against influenza  -     Influenza,

## 2024-10-07 NOTE — PATIENT INSTRUCTIONS
your arteries, which can eventually lead to a heart attack or stroke.   The two types of cholesterol that are most commonly referred to are:   Low-density lipoprotein (LDL) cholesterol  Also known as bad cholesterol, this is the cholesterol that tends to build up along your arteries. Bad cholesterol levels are increased by eating fats that are saturated or hydrogenated. Optimal level of this cholesterol is less than 100. Over 130 starts to get risky for heart disease.   High-density lipoprotein (HDL) cholesterol  Also known as good cholesterol, this type of cholesterol actually carries cholesterol away from your arteries and may, therefore, help lower your risk of having a heart attack. You want this level to be high (ideally greater than 60). It is a risk to have a level less than 40. You can raise this good cholesterol by eating olive oil, canola oil, avocados, or nuts. Exercise raises this level, too.   Fat    Fat is calorie dense and packs a lot of calories into a small amount of food. Even though fats should be limited due to their high calorie content, not all fats are bad. In fact, some fats are quite healthful. Fat can be broken down into four main types.   The good-for-you fats are:   Monounsaturated fat  found in oils such as olive and canola, avocados, and nuts and natural nut butters; can decrease cholesterol levels, while keeping levels of HDL cholesterol high   Polyunsaturated fat  found in oils such as safflower, sunflower, soybean, corn, and sesame; can decrease total cholesterol and LDL cholesterol   Omega-3 fatty acids  particularly those found in fatty fish (such as salmon, trout, tuna, mackerel, herring, and sardines); can decrease risk of arrhythmias, decrease triglyceride levels, and slightly lower blood pressure   The fats that you want to limit are:   Saturated fat  found in animal products, many fast foods, and a few vegetables; increases total blood cholesterol, including LDL levels

## 2024-10-08 LAB
EST. AVERAGE GLUCOSE BLD GHB EST-MCNC: 119.8 MG/DL
HBA1C MFR BLD: 5.8 %

## 2024-10-11 NOTE — RESULT ENCOUNTER NOTE
Recent labs are reviewed.  Metabolic panel looks good with respect to electrolytes liver and kidney function.  Sugar is very slightly elevated at 108.  Cholesterol profile has elevated cholesterol total, fasting triglycerides and LDL.  We can discuss at upcoming follow-up.  Hemoglobin A1c is okay at 5.8 this is in the prediabetic range and consistent with previous readings.    The 10-year ASCVD risk score (Axel MORALES, et al., 2019) is: 7%    Values used to calculate the score:      Age: 64 years      Sex: Female      Is Non- : No      Diabetic: No      Tobacco smoker: No      Systolic Blood Pressure: 130 mmHg      Is BP treated: No      HDL Cholesterol: 53 mg/dL      Total Cholesterol: 233 mg/dL

## 2024-10-29 LAB — NONINV COLON CA DNA+OCC BLD SCRN STL QL: NEGATIVE

## 2024-11-05 ENCOUNTER — OFFICE VISIT (OUTPATIENT)
Dept: CARDIOLOGY CLINIC | Age: 64
End: 2024-11-05

## 2024-11-05 VITALS
SYSTOLIC BLOOD PRESSURE: 136 MMHG | HEIGHT: 67 IN | BODY MASS INDEX: 32.65 KG/M2 | OXYGEN SATURATION: 98 % | HEART RATE: 77 BPM | DIASTOLIC BLOOD PRESSURE: 78 MMHG | WEIGHT: 208 LBS

## 2024-11-05 DIAGNOSIS — I48.0 PAROXYSMAL ATRIAL FIBRILLATION (HCC): Primary | ICD-10-CM

## 2024-11-05 DIAGNOSIS — Z00.00 EXAMINATION: ICD-10-CM

## 2024-11-05 NOTE — PROGRESS NOTES
Constitutional: No Fever or Weight Loss   Eyes: No Decreased Vision  ENT: No Headaches, Hearing Loss or Vertigo  Cardiovascular: No chest pain, dyspnea on exertion, palpitations or loss of consciousness  Respiratory: No cough or wheezing    Gastrointestinal: No abdominal pain, appetite loss, blood in stools, constipation, diarrhea or heartburn  Genitourinary: No dysuria, trouble voiding, or hematuria  Musculoskeletal:  No gait disturbance, weakness or joint complaints  Integumentary: No rash or pruritis  Neurological: No TIA or stroke symptoms  Psychiatric: No anxiety or depression  Endocrine: No malaise, fatigue or temperature intolerance  Hematologic/Lymphatic: No bleeding problems, blood clots or swollen lymph nodes  Allergic/Immunologic: No nasal congestion or hives  All systems negative except as marked.            Physical Examination:    Vitals:    11/05/24 0818   BP: 136/78   Pulse: 77   SpO2: 98%    Rr 14  afebrile  Wt Readings from Last 3 Encounters:   11/05/24 94.3 kg (208 lb)   10/07/24 94.3 kg (208 lb)   11/29/23 90.7 kg (200 lb)     Body mass index is 32.58 kg/m².    General Appearance:  No distress, conversant    Constitutional:  Well developed, Well nourished, No acute distress, Non-toxic appearance.   HENT:  Normocephalic, Atraumatic, Bilateral external ears normal, Oropharynx moist, No oral exudates, Nose normal. Neck- Normal range of motion, No tenderness, Supple, No stridor,no apical-carotid delay, no carotid bruit  Eyes:  PERRL, EOMI, Conjunctiva normal, No discharge.   Respiratory:  Normal breath sounds, No respiratory distress, No wheezing, No chest tenderness.,no use of accessory muscles, diaphragm movement is normal  Cardiovascular: (PMI) apex non displaced,no lifts no thrills, no s3,no s4, Normal heart rate, Normal rhythm, No murmurs, No rubs, No gallops. Carotid arteries pulse and amplitude are normal no bruit, no abdominal bruit noted ( normal abdominal aorta ausculation), femoral

## 2024-11-19 ENCOUNTER — OFFICE VISIT (OUTPATIENT)
Dept: CARDIOLOGY CLINIC | Age: 64
End: 2024-11-19
Payer: MEDICARE

## 2024-11-19 ENCOUNTER — OFFICE VISIT (OUTPATIENT)
Dept: PULMONOLOGY | Age: 64
End: 2024-11-19
Payer: MEDICARE

## 2024-11-19 ENCOUNTER — HOSPITAL ENCOUNTER (INPATIENT)
Age: 64
LOS: 2 days | Discharge: HOME | DRG: 309 | End: 2024-11-21
Attending: EMERGENCY MEDICINE | Admitting: STUDENT IN AN ORGANIZED HEALTH CARE EDUCATION/TRAINING PROGRAM
Payer: MEDICARE

## 2024-11-19 ENCOUNTER — APPOINTMENT (OUTPATIENT)
Dept: GENERAL RADIOLOGY | Age: 64
DRG: 309 | End: 2024-11-19
Payer: MEDICARE

## 2024-11-19 VITALS
SYSTOLIC BLOOD PRESSURE: 126 MMHG | OXYGEN SATURATION: 97 % | BODY MASS INDEX: 32.71 KG/M2 | HEART RATE: 112 BPM | WEIGHT: 208.4 LBS | DIASTOLIC BLOOD PRESSURE: 76 MMHG | HEIGHT: 67 IN

## 2024-11-19 VITALS — HEART RATE: 145 BPM

## 2024-11-19 DIAGNOSIS — I48.0 PAROXYSMAL ATRIAL FIBRILLATION (HCC): Primary | ICD-10-CM

## 2024-11-19 DIAGNOSIS — I48.91 ATRIAL FIBRILLATION WITH RAPID VENTRICULAR RESPONSE (HCC): Primary | ICD-10-CM

## 2024-11-19 DIAGNOSIS — G47.10 HYPERSOMNIA: ICD-10-CM

## 2024-11-19 DIAGNOSIS — E66.9 OBESITY (BMI 30-39.9): ICD-10-CM

## 2024-11-19 DIAGNOSIS — I48.91 ATRIAL FIBRILLATION, UNSPECIFIED TYPE (HCC): ICD-10-CM

## 2024-11-19 DIAGNOSIS — G47.33 OSA (OBSTRUCTIVE SLEEP APNEA): Primary | ICD-10-CM

## 2024-11-19 DIAGNOSIS — I48.0 PAROXYSMAL ATRIAL FIBRILLATION (HCC): ICD-10-CM

## 2024-11-19 LAB
ALBUMIN SERPL-MCNC: 4.4 G/DL (ref 3.4–5)
ALBUMIN/GLOB SERPL: 1.7 {RATIO} (ref 1.1–2.2)
ALP SERPL-CCNC: 93 U/L (ref 40–129)
ALT SERPL-CCNC: 26 U/L (ref 10–40)
ANION GAP SERPL CALCULATED.3IONS-SCNC: 11 MMOL/L (ref 9–17)
ARTERIAL PATENCY WRIST A: ABNORMAL
AST SERPL-CCNC: 21 U/L (ref 15–37)
BASOPHILS # BLD: 0.04 K/UL
BASOPHILS NFR BLD: 1 % (ref 0–1)
BILIRUB SERPL-MCNC: 0.4 MG/DL (ref 0–1)
BNP SERPL-MCNC: 1369 PG/ML (ref 0–125)
BODY TEMPERATURE: 37
BUN SERPL-MCNC: 19 MG/DL (ref 7–20)
CALCIUM SERPL-MCNC: 9.9 MG/DL (ref 8.3–10.6)
CHLORIDE SERPL-SCNC: 103 MMOL/L (ref 99–110)
CO2 SERPL-SCNC: 26 MMOL/L (ref 21–32)
COHGB MFR BLD: 0.8 % (ref 0.5–1.5)
CREAT SERPL-MCNC: 0.8 MG/DL (ref 0.6–1.2)
EKG ATRIAL RATE: 144 BPM
EKG DIAGNOSIS: NORMAL
EKG Q-T INTERVAL: 288 MS
EKG QRS DURATION: 68 MS
EKG QTC CALCULATION (BAZETT): 445 MS
EKG R AXIS: 48 DEGREES
EKG T AXIS: 17 DEGREES
EKG VENTRICULAR RATE: 144 BPM
EOSINOPHIL # BLD: 0.19 K/UL
EOSINOPHILS RELATIVE PERCENT: 2 % (ref 0–3)
ERYTHROCYTE [DISTWIDTH] IN BLOOD BY AUTOMATED COUNT: 12.2 % (ref 11.7–14.9)
GFR, ESTIMATED: 75 ML/MIN/1.73M2
GLUCOSE SERPL-MCNC: 104 MG/DL (ref 74–99)
HCO3 VENOUS: 23.2 MMOL/L (ref 22–29)
HCT VFR BLD AUTO: 44.3 % (ref 37–47)
HGB BLD-MCNC: 14.7 G/DL (ref 12.5–16)
IMM GRANULOCYTES # BLD AUTO: 0.02 K/UL
IMM GRANULOCYTES NFR BLD: 0 %
LYMPHOCYTES NFR BLD: 2.01 K/UL
LYMPHOCYTES RELATIVE PERCENT: 24 % (ref 24–44)
MAGNESIUM SERPL-MCNC: 2.1 MG/DL (ref 1.8–2.4)
MCH RBC QN AUTO: 30 PG (ref 27–31)
MCHC RBC AUTO-ENTMCNC: 33.2 G/DL (ref 32–36)
MCV RBC AUTO: 90.4 FL (ref 78–100)
METHEMOGLOBIN: 0.3 % (ref 0.5–1.5)
MONOCYTES NFR BLD: 0.67 K/UL
MONOCYTES NFR BLD: 8 % (ref 0–4)
NEGATIVE BASE EXCESS, VEN: 1.4 MMOL/L (ref 0–3)
NEUTROPHILS NFR BLD: 65 % (ref 36–66)
NEUTS SEG NFR BLD: 5.49 K/UL
OXYHGB MFR BLD: 72.2 %
PCO2 VENOUS: 38.9 MM HG (ref 38–54)
PH VENOUS: 7.39 (ref 7.32–7.43)
PLATELET # BLD AUTO: 271 K/UL (ref 140–440)
PMV BLD AUTO: 9.7 FL (ref 7.5–11.1)
PO2 VENOUS: 39.3 MM HG (ref 23–48)
POTASSIUM SERPL-SCNC: 4.6 MMOL/L (ref 3.5–5.1)
PROT SERPL-MCNC: 7 G/DL (ref 6.4–8.2)
RBC # BLD AUTO: 4.9 M/UL (ref 4.2–5.4)
SODIUM SERPL-SCNC: 140 MMOL/L (ref 136–145)
T4 FREE SERPL-MCNC: 1.1 NG/DL (ref 0.9–1.8)
TROPONIN I SERPL HS-MCNC: 10 NG/L (ref 0–14)
TROPONIN I SERPL HS-MCNC: 9 NG/L (ref 0–14)
TSH SERPL DL<=0.05 MIU/L-ACNC: 1.82 UIU/ML (ref 0.27–4.2)
WBC OTHER # BLD: 8.4 K/UL (ref 4–10.5)

## 2024-11-19 PROCEDURE — 82805 BLOOD GASES W/O2 SATURATION: CPT

## 2024-11-19 PROCEDURE — 71045 X-RAY EXAM CHEST 1 VIEW: CPT

## 2024-11-19 PROCEDURE — 83880 ASSAY OF NATRIURETIC PEPTIDE: CPT

## 2024-11-19 PROCEDURE — 1123F ACP DISCUSS/DSCN MKR DOCD: CPT | Performed by: INTERNAL MEDICINE

## 2024-11-19 PROCEDURE — 96374 THER/PROPH/DIAG INJ IV PUSH: CPT

## 2024-11-19 PROCEDURE — 84443 ASSAY THYROID STIM HORMONE: CPT

## 2024-11-19 PROCEDURE — 2580000003 HC RX 258: Performed by: STUDENT IN AN ORGANIZED HEALTH CARE EDUCATION/TRAINING PROGRAM

## 2024-11-19 PROCEDURE — 2140000000 HC CCU INTERMEDIATE R&B

## 2024-11-19 PROCEDURE — 6370000000 HC RX 637 (ALT 250 FOR IP): Performed by: STUDENT IN AN ORGANIZED HEALTH CARE EDUCATION/TRAINING PROGRAM

## 2024-11-19 PROCEDURE — 93000 ELECTROCARDIOGRAM COMPLETE: CPT | Performed by: INTERNAL MEDICINE

## 2024-11-19 PROCEDURE — 84484 ASSAY OF TROPONIN QUANT: CPT

## 2024-11-19 PROCEDURE — 2580000003 HC RX 258: Performed by: EMERGENCY MEDICINE

## 2024-11-19 PROCEDURE — 93010 ELECTROCARDIOGRAM REPORT: CPT | Performed by: INTERNAL MEDICINE

## 2024-11-19 PROCEDURE — 84439 ASSAY OF FREE THYROXINE: CPT

## 2024-11-19 PROCEDURE — 94761 N-INVAS EAR/PLS OXIMETRY MLT: CPT

## 2024-11-19 PROCEDURE — 99214 OFFICE O/P EST MOD 30 MIN: CPT | Performed by: INTERNAL MEDICINE

## 2024-11-19 PROCEDURE — 85025 COMPLETE CBC W/AUTO DIFF WBC: CPT

## 2024-11-19 PROCEDURE — 36415 COLL VENOUS BLD VENIPUNCTURE: CPT

## 2024-11-19 PROCEDURE — 3078F DIAST BP <80 MM HG: CPT | Performed by: INTERNAL MEDICINE

## 2024-11-19 PROCEDURE — 1159F MED LIST DOCD IN RCRD: CPT | Performed by: INTERNAL MEDICINE

## 2024-11-19 PROCEDURE — 2500000003 HC RX 250 WO HCPCS: Performed by: EMERGENCY MEDICINE

## 2024-11-19 PROCEDURE — 99285 EMERGENCY DEPT VISIT HI MDM: CPT

## 2024-11-19 PROCEDURE — 83735 ASSAY OF MAGNESIUM: CPT

## 2024-11-19 PROCEDURE — 3074F SYST BP LT 130 MM HG: CPT | Performed by: INTERNAL MEDICINE

## 2024-11-19 PROCEDURE — 93005 ELECTROCARDIOGRAM TRACING: CPT | Performed by: EMERGENCY MEDICINE

## 2024-11-19 PROCEDURE — 6360000002 HC RX W HCPCS: Performed by: STUDENT IN AN ORGANIZED HEALTH CARE EDUCATION/TRAINING PROGRAM

## 2024-11-19 PROCEDURE — 80053 COMPREHEN METABOLIC PANEL: CPT

## 2024-11-19 RX ORDER — ACETAMINOPHEN 650 MG/1
650 SUPPOSITORY RECTAL EVERY 6 HOURS PRN
Status: DISCONTINUED | OUTPATIENT
Start: 2024-11-19 | End: 2024-11-21 | Stop reason: HOSPADM

## 2024-11-19 RX ORDER — ONDANSETRON 4 MG/1
4 TABLET, ORALLY DISINTEGRATING ORAL EVERY 8 HOURS PRN
Status: DISCONTINUED | OUTPATIENT
Start: 2024-11-19 | End: 2024-11-21 | Stop reason: HOSPADM

## 2024-11-19 RX ORDER — ONDANSETRON 2 MG/ML
4 INJECTION INTRAMUSCULAR; INTRAVENOUS EVERY 6 HOURS PRN
Status: DISCONTINUED | OUTPATIENT
Start: 2024-11-19 | End: 2024-11-21 | Stop reason: HOSPADM

## 2024-11-19 RX ORDER — SODIUM CHLORIDE 0.9 % (FLUSH) 0.9 %
5-40 SYRINGE (ML) INJECTION PRN
Status: DISCONTINUED | OUTPATIENT
Start: 2024-11-19 | End: 2024-11-21 | Stop reason: HOSPADM

## 2024-11-19 RX ORDER — MAGNESIUM SULFATE IN WATER 40 MG/ML
2000 INJECTION, SOLUTION INTRAVENOUS PRN
Status: DISCONTINUED | OUTPATIENT
Start: 2024-11-19 | End: 2024-11-21 | Stop reason: HOSPADM

## 2024-11-19 RX ORDER — ASCORBIC ACID 500 MG
500 TABLET ORAL DAILY
COMMUNITY
End: 2024-11-19

## 2024-11-19 RX ORDER — ENOXAPARIN SODIUM 100 MG/ML
40 INJECTION SUBCUTANEOUS
Status: DISCONTINUED | OUTPATIENT
Start: 2024-11-19 | End: 2024-11-20

## 2024-11-19 RX ORDER — POTASSIUM CHLORIDE 7.45 MG/ML
10 INJECTION INTRAVENOUS PRN
Status: DISCONTINUED | OUTPATIENT
Start: 2024-11-19 | End: 2024-11-21 | Stop reason: HOSPADM

## 2024-11-19 RX ORDER — POLYETHYLENE GLYCOL 3350 17 G/17G
17 POWDER, FOR SOLUTION ORAL DAILY PRN
Status: DISCONTINUED | OUTPATIENT
Start: 2024-11-19 | End: 2024-11-21 | Stop reason: HOSPADM

## 2024-11-19 RX ORDER — POTASSIUM CHLORIDE 1500 MG/1
40 TABLET, EXTENDED RELEASE ORAL PRN
Status: DISCONTINUED | OUTPATIENT
Start: 2024-11-19 | End: 2024-11-21 | Stop reason: HOSPADM

## 2024-11-19 RX ORDER — SODIUM CHLORIDE 9 MG/ML
INJECTION, SOLUTION INTRAVENOUS PRN
Status: DISCONTINUED | OUTPATIENT
Start: 2024-11-19 | End: 2024-11-21 | Stop reason: HOSPADM

## 2024-11-19 RX ORDER — DILTIAZEM HYDROCHLORIDE 5 MG/ML
10 INJECTION INTRAVENOUS ONCE
Status: COMPLETED | OUTPATIENT
Start: 2024-11-19 | End: 2024-11-19

## 2024-11-19 RX ORDER — ACETAMINOPHEN 325 MG/1
650 TABLET ORAL EVERY 6 HOURS PRN
Status: DISCONTINUED | OUTPATIENT
Start: 2024-11-19 | End: 2024-11-21 | Stop reason: HOSPADM

## 2024-11-19 RX ORDER — SODIUM CHLORIDE 0.9 % (FLUSH) 0.9 %
5-40 SYRINGE (ML) INJECTION EVERY 12 HOURS SCHEDULED
Status: DISCONTINUED | OUTPATIENT
Start: 2024-11-19 | End: 2024-11-21 | Stop reason: HOSPADM

## 2024-11-19 RX ORDER — ASPIRIN 81 MG/1
81 TABLET, CHEWABLE ORAL
Status: DISCONTINUED | OUTPATIENT
Start: 2024-11-19 | End: 2024-11-21

## 2024-11-19 RX ADMIN — SODIUM CHLORIDE, PRESERVATIVE FREE 10 ML: 5 INJECTION INTRAVENOUS at 20:05

## 2024-11-19 RX ADMIN — ASPIRIN 81 MG: 81 TABLET, CHEWABLE ORAL at 15:59

## 2024-11-19 RX ADMIN — SODIUM CHLORIDE 5 MG/HR: 900 INJECTION, SOLUTION INTRAVENOUS at 12:37

## 2024-11-19 RX ADMIN — ACETAMINOPHEN 650 MG: 325 TABLET ORAL at 19:58

## 2024-11-19 RX ADMIN — DILTIAZEM HYDROCHLORIDE 10 MG: 5 INJECTION, SOLUTION INTRAVENOUS at 12:29

## 2024-11-19 RX ADMIN — SODIUM CHLORIDE 2.5 MG/HR: 900 INJECTION, SOLUTION INTRAVENOUS at 22:12

## 2024-11-19 RX ADMIN — ENOXAPARIN SODIUM 40 MG: 100 INJECTION SUBCUTANEOUS at 20:00

## 2024-11-19 ASSESSMENT — ENCOUNTER SYMPTOMS
ABDOMINAL DISTENTION: 0
COUGH: 0
BACK PAIN: 0
SHORTNESS OF BREATH: 0
EYE DISCHARGE: 0
EYE ITCHING: 0
ABDOMINAL PAIN: 0

## 2024-11-19 ASSESSMENT — PAIN SCALES - GENERAL
PAINLEVEL_OUTOF10: 4
PAINLEVEL_OUTOF10: 6
PAINLEVEL_OUTOF10: 0

## 2024-11-19 ASSESSMENT — PAIN DESCRIPTION - DESCRIPTORS: DESCRIPTORS: ACHING

## 2024-11-19 ASSESSMENT — PAIN SCALES - WONG BAKER: WONGBAKER_NUMERICALRESPONSE: HURTS A LITTLE BIT

## 2024-11-19 ASSESSMENT — PAIN DESCRIPTION - ORIENTATION: ORIENTATION: ANTERIOR

## 2024-11-19 NOTE — PROGRESS NOTES
Jenny Iqbal  1960  Referring Provider: Karissa Branch MDPCP - General     Subjective:     Chief Complaint   Patient presents with    Follow-up     1YR COMPLIANCE        HPI  Jenny is a 64 y.o. female has come back as a follow up. She has severe ANISA on a CPAP of 8 cm h20 which she has been using it every night about 6 to 8 hours. She says that it is helping her. She has 8 lb weight gain.She has a nasal pillows mask. She is not sleepy or tired during the day time. Her 2 week download data showed a residual AHI is 1.4 and leak is 33.1 L/min.     Current Outpatient Medications   Medication Sig Dispense Refill    aspirin 81 MG tablet Take 1 tablet by mouth daily      CPAP Machine MISC by Does not apply route every evening      acetaminophen (TYLENOL) 500 MG tablet Take 1 tablet by mouth 4 times daily as needed for Pain 40 tablet 0     No current facility-administered medications for this visit.       Allergies   Allergen Reactions    Codeine      Nausea and vomiting        Past Medical History:   Diagnosis Date    Atrial fibrillation (HCC)     H/O cardiac catheterization 08/15/2019     normal coronaries, normal LVEF    History of cardioversion 08/15/2019    Patient is successfully cardioverted into NSR.    History of cardioversion 2019    Impression: Patient is successfully cardioverted into NSR.    History of transesophageal echocardiography (ALAN) 08/15/2019    There is no evidence of thrombus in the Left Atrial Appendage. Negative Buble Study, Mod MR, Mild TR    History of transesophageal echocardiography (ALAN) 2019     Sclerotic aortic valve. Severe TR, Mod MR.    Hyperlipidemia        Past Surgical History:   Procedure Laterality Date    APPENDECTOMY      ATRIAL ABLATION SURGERY Left 2019    Atrial fib ablation (PVI & post wall)     SECTION      CHOLECYSTECTOMY      HYSTERECTOMY (CERVIX STATUS UNKNOWN)         Social History     Socioeconomic History    Marital status:

## 2024-11-19 NOTE — H&P
([facility])     H/O cardiac catheterization 08/15/2019     normal coronaries, normal LVEF    History of cardioversion 08/15/2019    Patient is successfully cardioverted into NSR.    History of cardioversion 2019    Impression: Patient is successfully cardioverted into NSR.    History of transesophageal echocardiography (ALAN) 08/15/2019    There is no evidence of thrombus in the Left Atrial Appendage. Negative Buble Study, Mod MR, Mild TR    History of transesophageal echocardiography (ALAN) 2019     Sclerotic aortic valve. Severe TR, Mod MR.    Hyperlipidemia      PSHX:  has a past surgical history that includes Cholecystectomy; Appendectomy;  section; Hysterectomy; and Atrial ablation surgery (Left, 2019).  Allergies:   Allergies   Allergen Reactions    Codeine      Nausea and vomiting      Fam HX:  family history includes Heart Surgery in her mother; High Cholesterol in her mother; Hypertension in her father.  Soc HX:   Social History     Socioeconomic History    Marital status:      Spouse name: None    Number of children: None    Years of education: None    Highest education level: None   Tobacco Use    Smoking status: Never    Smokeless tobacco: Never   Vaping Use    Vaping status: Never Used   Substance and Sexual Activity    Alcohol use: Never    Drug use: Never    Sexual activity: Yes     Partners: Male     Social Determinants of Health     Financial Resource Strain: Low Risk  (2023)    Overall Financial Resource Strain (CARDIA)     Difficulty of Paying Living Expenses: Not hard at all   Transportation Needs: Unknown (2023)    PRAPARE - Transportation     Lack of Transportation (Non-Medical): No   Physical Activity: Insufficiently Active (10/7/2024)    Exercise Vital Sign     Days of Exercise per Week: 2 days     Minutes of Exercise per Session: 30 min   Housing Stability: Unknown (2023)    Housing Stability Vital Sign     Unstable Housing in the Last Year: No

## 2024-11-19 NOTE — ED PROVIDER NOTES
Emergency Department Encounter    Patient: Jenny Iqbal  MRN: [id number]  : 1960  Date of Evaluation: 2024  ED Provider:  Ceci Hartman MD    Triage Chief Complaint:   Tachycardia (Afib, history of afib, sent by her PCP)    [place]:  Jenny Iqbal is a 64 y.o. female that presents with concern for atrial fibrillation.  Was sent by outpatient office.  She was at her pulmonologist today for discussion about her sleep study.  She was then going to the cardiology office to get her echocardiogram done.  She had seen Dr. Jackson just about a week and a half ago she tells me, things were going well but he wanted an echocardiogram to see how her heart was doing.  She does have a history of paroxysmal atrial fibrillation but does not feel it when she goes into it. No chest pain. No shortness of breath. No nausea or vomiting or diarrhea. No fevers. No recent illness.  Says the echo tech saw she was in afib and asked the doctor at [facility] and they told her to come to the ED.    ROS - see HPI, below listed is current ROS at time of my eval:  10 systems reviewed and negative except as above.     Past Medical History:   Diagnosis Date    Atrial fibrillation (HCC)     H/O cardiac catheterization 08/15/2019     normal coronaries, normal LVEF    History of cardioversion 08/15/2019    Patient is successfully cardioverted into NSR.    History of cardioversion 2019    Impression: Patient is successfully cardioverted into NSR.    History of transesophageal echocardiography (ALAN) 08/15/2019    There is no evidence of thrombus in the Left Atrial Appendage. Negative Buble Study, Mod MR, Mild TR    History of transesophageal echocardiography (ALAN) 2019     Sclerotic aortic valve. Severe TR, Mod MR.    Hyperlipidemia      Past Surgical History:   Procedure Laterality Date    APPENDECTOMY      ATRIAL ABLATION SURGERY Left 2019    Atrial fib ablation (PVI & post wall)     SECTION

## 2024-11-19 NOTE — ED NOTES
Medication History  [facility]    Patient Name: Jenny Iqbal 1960     Medication history has been completed by: Val Rosenberg CPhT    Source(s) of information: patient     Primary Care Physician: Karissa Branch MD     Pharmacy: [place], [place]    Allergies as of 11/19/2024 - Fully Reviewed 11/19/2024   Allergen Reaction Noted    Codeine  04/17/2019        Prior to Admission medications    Medication Sig Start Date End Date Taking? Authorizing Provider   vitamin C (ASCORBIC ACID) 500 MG tablet Take 1 tablet by mouth daily   Yes Juan Miguel Calvin MD   VITAMIN D PO Take 1 tablet by mouth daily   Yes Juan Miguel Calvin MD   Turmeric (QC TUMERIC COMPLEX PO) Take 1 capsule by mouth daily   Yes ProviderJuan Miguel MD   aspirin 81 MG tablet Take 1 tablet by mouth Daily with lunch   Yes Juan Miguel Calvin MD   acetaminophen (TYLENOL) 500 MG tablet Take 1 tablet by mouth 4 times daily as needed for Pain 2/9/22 11/5/24  Lucas Marmolejo PA-C   CPAP Machine MISC by Does not apply route every evening    Juan Miguel Calvin MD     Medications added or changed (ex. new medication, dosage change, interval change, formulation change):  Vitamin C (added)  Vitamin D (added)  Tumeric (added)    Comments:  Patient reports she only takes OTC aspirin and vitamin/mineral supplements.  No supplements taken prior to ER visit.    To my knowledge the above medication history is accurate as of 11/19/2024 12:25 PM.   Val Rosenberg CPhT   11/19/2024 12:25 PM

## 2024-11-19 NOTE — ED NOTES
ED TO INPATIENT SBAR HANDOFF    Patient Name: Jenny Iqbal   :  1960  64 y.o.   Preferred Name    Family/Caregiver Present yes   Restraints no   C-SSRS: Risk of Suicide: No Risk  Sitter no   Sepsis Risk Score        Situation  Chief Complaint   Patient presents with    Tachycardia     Afib, history of afib, sent by her PCP     Brief Description of Patient's Condition: Pt arrives to ED w/ c/o Afib w/ RVR but pt reports no s/sx, pt was at appt when pt was informed their heart was in afib w/ rvr. Pt has hx of 2x electrocardioverts as well as 1 surg hx of an ablationin 2019. Pt has had no known episodes since 2019 until today.   Mental Status: oriented, alert, coherent, logical, thought processes intact, and able to concentrate and follow conversation  Arrived from: home    Imaging:   XR CHEST PORTABLE   Final Result   No acute findings in the chest..         Electronically signed by Ervin Rod MD        Abnormal labs:   Abnormal Labs Reviewed   BRAIN NATRIURETIC PEPTIDE - Abnormal; Notable for the following components:       Result Value    NT Pro-BNP 1,369 (*)     All other components within normal limits   COMPREHENSIVE METABOLIC PANEL - Abnormal; Notable for the following components:    Glucose 104 (*)     All other components within normal limits   CBC WITH AUTO DIFFERENTIAL - Abnormal; Notable for the following components:    Monocytes % 8 (*)     All other components within normal limits   BLOOD GAS, VENOUS - Abnormal; Notable for the following components:    Methemoglobin 0.3 (*)     All other components within normal limits        Background  History:   Past Medical History:   Diagnosis Date    Atrial fibrillation (HCC)     H/O cardiac catheterization 08/15/2019     normal coronaries, normal LVEF    History of cardioversion 08/15/2019    Patient is successfully cardioverted into NSR.    History of cardioversion 2019    Impression: Patient is successfully cardioverted into NSR.    History of

## 2024-11-20 ENCOUNTER — APPOINTMENT (OUTPATIENT)
Dept: NON INVASIVE DIAGNOSTICS | Age: 64
DRG: 309 | End: 2024-11-20
Payer: MEDICARE

## 2024-11-20 ENCOUNTER — ANESTHESIA EVENT (OUTPATIENT)
Dept: NON INVASIVE DIAGNOSTICS | Age: 64
DRG: 309 | End: 2024-11-20
Payer: MEDICARE

## 2024-11-20 ENCOUNTER — ANESTHESIA (OUTPATIENT)
Dept: NON INVASIVE DIAGNOSTICS | Age: 64
DRG: 309 | End: 2024-11-20
Payer: MEDICARE

## 2024-11-20 LAB
ALBUMIN SERPL-MCNC: 3.8 G/DL (ref 3.4–5)
ALBUMIN/GLOB SERPL: 1.3 {RATIO} (ref 1.1–2.2)
ALP SERPL-CCNC: 80 U/L (ref 40–129)
ALT SERPL-CCNC: 20 U/L (ref 10–40)
ANION GAP SERPL CALCULATED.3IONS-SCNC: 13 MMOL/L (ref 9–17)
AST SERPL-CCNC: 24 U/L (ref 15–37)
BASOPHILS # BLD: 0.02 K/UL
BASOPHILS NFR BLD: 0 % (ref 0–1)
BILIRUB SERPL-MCNC: 0.6 MG/DL (ref 0–1)
BUN SERPL-MCNC: 15 MG/DL (ref 7–20)
CALCIUM SERPL-MCNC: 9.2 MG/DL (ref 8.3–10.6)
CHLORIDE SERPL-SCNC: 103 MMOL/L (ref 99–110)
CO2 SERPL-SCNC: 22 MMOL/L (ref 21–32)
CREAT SERPL-MCNC: 0.7 MG/DL (ref 0.6–1.2)
ECHO BSA: 2.1 M2
EKG ATRIAL RATE: 81 BPM
EKG DIAGNOSIS: NORMAL
EKG P AXIS: 51 DEGREES
EKG P-R INTERVAL: 152 MS
EKG Q-T INTERVAL: 386 MS
EKG QRS DURATION: 84 MS
EKG QTC CALCULATION (BAZETT): 448 MS
EKG R AXIS: 59 DEGREES
EKG T AXIS: 29 DEGREES
EKG VENTRICULAR RATE: 81 BPM
EOSINOPHIL # BLD: 0.17 K/UL
EOSINOPHILS RELATIVE PERCENT: 3 % (ref 0–3)
ERYTHROCYTE [DISTWIDTH] IN BLOOD BY AUTOMATED COUNT: 12.4 % (ref 11.7–14.9)
GFR, ESTIMATED: 85 ML/MIN/1.73M2
GLUCOSE SERPL-MCNC: 127 MG/DL (ref 74–99)
HCT VFR BLD AUTO: 45.7 % (ref 37–47)
HGB BLD-MCNC: 14.5 G/DL (ref 12.5–16)
IMM GRANULOCYTES # BLD AUTO: 0.01 K/UL
IMM GRANULOCYTES NFR BLD: 0 %
LYMPHOCYTES NFR BLD: 1.97 K/UL
LYMPHOCYTES RELATIVE PERCENT: 33 % (ref 24–44)
MCH RBC QN AUTO: 29.7 PG (ref 27–31)
MCHC RBC AUTO-ENTMCNC: 31.7 G/DL (ref 32–36)
MCV RBC AUTO: 93.6 FL (ref 78–100)
MONOCYTES NFR BLD: 0.51 K/UL
MONOCYTES NFR BLD: 9 % (ref 0–4)
NEUTROPHILS NFR BLD: 55 % (ref 36–66)
NEUTS SEG NFR BLD: 3.33 K/UL
PLATELET # BLD AUTO: 257 K/UL (ref 140–440)
PMV BLD AUTO: 9.6 FL (ref 7.5–11.1)
POTASSIUM SERPL-SCNC: 4.4 MMOL/L (ref 3.5–5.1)
PROT SERPL-MCNC: 6.8 G/DL (ref 6.4–8.2)
RBC # BLD AUTO: 4.88 M/UL (ref 4.2–5.4)
SODIUM SERPL-SCNC: 137 MMOL/L (ref 136–145)
WBC OTHER # BLD: 6 K/UL (ref 4–10.5)

## 2024-11-20 PROCEDURE — 2580000003 HC RX 258: Performed by: FAMILY MEDICINE

## 2024-11-20 PROCEDURE — 3700000000 HC ANESTHESIA ATTENDED CARE: Performed by: INTERNAL MEDICINE

## 2024-11-20 PROCEDURE — APPNB15 APP NON BILLABLE TIME 0-15 MINS

## 2024-11-20 PROCEDURE — B24BZZ4 ULTRASONOGRAPHY OF HEART WITH AORTA, TRANSESOPHAGEAL: ICD-10-PCS | Performed by: INTERNAL MEDICINE

## 2024-11-20 PROCEDURE — 7100000010 HC PHASE II RECOVERY - FIRST 15 MIN: Performed by: INTERNAL MEDICINE

## 2024-11-20 PROCEDURE — 94761 N-INVAS EAR/PLS OXIMETRY MLT: CPT

## 2024-11-20 PROCEDURE — 6360000002 HC RX W HCPCS: Performed by: NURSE ANESTHETIST, CERTIFIED REGISTERED

## 2024-11-20 PROCEDURE — 6370000000 HC RX 637 (ALT 250 FOR IP): Performed by: STUDENT IN AN ORGANIZED HEALTH CARE EDUCATION/TRAINING PROGRAM

## 2024-11-20 PROCEDURE — 93005 ELECTROCARDIOGRAM TRACING: CPT | Performed by: INTERNAL MEDICINE

## 2024-11-20 PROCEDURE — 6370000000 HC RX 637 (ALT 250 FOR IP)

## 2024-11-20 PROCEDURE — 5A2204Z RESTORATION OF CARDIAC RHYTHM, SINGLE: ICD-10-PCS | Performed by: INTERNAL MEDICINE

## 2024-11-20 PROCEDURE — 6370000000 HC RX 637 (ALT 250 FOR IP): Performed by: INTERNAL MEDICINE

## 2024-11-20 PROCEDURE — 2140000000 HC CCU INTERMEDIATE R&B

## 2024-11-20 PROCEDURE — 80053 COMPREHEN METABOLIC PANEL: CPT

## 2024-11-20 PROCEDURE — 93325 DOPPLER ECHO COLOR FLOW MAPG: CPT

## 2024-11-20 PROCEDURE — 7100000011 HC PHASE II RECOVERY - ADDTL 15 MIN: Performed by: INTERNAL MEDICINE

## 2024-11-20 PROCEDURE — 36415 COLL VENOUS BLD VENIPUNCTURE: CPT

## 2024-11-20 PROCEDURE — 2580000003 HC RX 258: Performed by: NURSE ANESTHETIST, CERTIFIED REGISTERED

## 2024-11-20 PROCEDURE — 2500000003 HC RX 250 WO HCPCS: Performed by: NURSE ANESTHETIST, CERTIFIED REGISTERED

## 2024-11-20 PROCEDURE — 2580000003 HC RX 258: Performed by: STUDENT IN AN ORGANIZED HEALTH CARE EDUCATION/TRAINING PROGRAM

## 2024-11-20 PROCEDURE — 85025 COMPLETE CBC W/AUTO DIFF WBC: CPT

## 2024-11-20 PROCEDURE — 3700000001 HC ADD 15 MINUTES (ANESTHESIA): Performed by: INTERNAL MEDICINE

## 2024-11-20 RX ORDER — METOPROLOL TARTRATE 25 MG/1
25 TABLET, FILM COATED ORAL 2 TIMES DAILY
Status: DISCONTINUED | OUTPATIENT
Start: 2024-11-20 | End: 2024-11-21 | Stop reason: HOSPADM

## 2024-11-20 RX ORDER — 0.9 % SODIUM CHLORIDE 0.9 %
500 INTRAVENOUS SOLUTION INTRAVENOUS ONCE
Status: COMPLETED | OUTPATIENT
Start: 2024-11-20 | End: 2024-11-20

## 2024-11-20 RX ORDER — SODIUM CHLORIDE 9 MG/ML
INJECTION, SOLUTION INTRAVENOUS
Status: DISCONTINUED | OUTPATIENT
Start: 2024-11-20 | End: 2024-11-20 | Stop reason: SDUPTHER

## 2024-11-20 RX ORDER — DILTIAZEM HYDROCHLORIDE 30 MG/1
30 TABLET, FILM COATED ORAL EVERY 6 HOURS SCHEDULED
Status: DISCONTINUED | OUTPATIENT
Start: 2024-11-20 | End: 2024-11-20

## 2024-11-20 RX ORDER — PROPOFOL 10 MG/ML
INJECTION, EMULSION INTRAVENOUS
Status: DISCONTINUED | OUTPATIENT
Start: 2024-11-20 | End: 2024-11-20 | Stop reason: SDUPTHER

## 2024-11-20 RX ORDER — ENOXAPARIN SODIUM 100 MG/ML
1 INJECTION SUBCUTANEOUS 2 TIMES DAILY
Status: DISCONTINUED | OUTPATIENT
Start: 2024-11-20 | End: 2024-11-20

## 2024-11-20 RX ORDER — PROPAFENONE HYDROCHLORIDE 150 MG/1
150 TABLET, COATED ORAL EVERY 8 HOURS SCHEDULED
Status: DISCONTINUED | OUTPATIENT
Start: 2024-11-20 | End: 2024-11-21 | Stop reason: HOSPADM

## 2024-11-20 RX ORDER — LIDOCAINE HYDROCHLORIDE 20 MG/ML
INJECTION, SOLUTION EPIDURAL; INFILTRATION; INTRACAUDAL; PERINEURAL
Status: DISCONTINUED | OUTPATIENT
Start: 2024-11-20 | End: 2024-11-20 | Stop reason: SDUPTHER

## 2024-11-20 RX ADMIN — ASPIRIN 81 MG: 81 TABLET, CHEWABLE ORAL at 12:49

## 2024-11-20 RX ADMIN — SODIUM CHLORIDE, PRESERVATIVE FREE 10 ML: 5 INJECTION INTRAVENOUS at 20:13

## 2024-11-20 RX ADMIN — DILTIAZEM HYDROCHLORIDE 30 MG: 30 TABLET, FILM COATED ORAL at 12:49

## 2024-11-20 RX ADMIN — PROPAFENONE HYDROCHLORIDE 150 MG: 150 TABLET, FILM COATED ORAL at 15:01

## 2024-11-20 RX ADMIN — METOPROLOL TARTRATE 25 MG: 25 TABLET, FILM COATED ORAL at 20:13

## 2024-11-20 RX ADMIN — SODIUM CHLORIDE: 9 INJECTION, SOLUTION INTRAVENOUS at 13:24

## 2024-11-20 RX ADMIN — LIDOCAINE HYDROCHLORIDE 100 MG: 20 INJECTION, SOLUTION EPIDURAL; INFILTRATION; INTRACAUDAL; PERINEURAL at 13:43

## 2024-11-20 RX ADMIN — SODIUM CHLORIDE, PRESERVATIVE FREE 10 ML: 5 INJECTION INTRAVENOUS at 08:16

## 2024-11-20 RX ADMIN — PROPAFENONE HYDROCHLORIDE 150 MG: 150 TABLET, FILM COATED ORAL at 20:13

## 2024-11-20 RX ADMIN — PROPOFOL 150 MG: 10 INJECTION, EMULSION INTRAVENOUS at 13:53

## 2024-11-20 RX ADMIN — METOPROLOL TARTRATE 25 MG: 25 TABLET, FILM COATED ORAL at 15:01

## 2024-11-20 RX ADMIN — SODIUM CHLORIDE 500 ML: 9 INJECTION, SOLUTION INTRAVENOUS at 02:31

## 2024-11-20 RX ADMIN — APIXABAN 5 MG: 5 TABLET, FILM COATED ORAL at 15:01

## 2024-11-20 RX ADMIN — APIXABAN 5 MG: 5 TABLET, FILM COATED ORAL at 20:13

## 2024-11-20 ASSESSMENT — PAIN - FUNCTIONAL ASSESSMENT
PAIN_FUNCTIONAL_ASSESSMENT: NONE - DENIES PAIN
PAIN_FUNCTIONAL_ASSESSMENT: NONE - DENIES PAIN

## 2024-11-20 ASSESSMENT — ENCOUNTER SYMPTOMS: SHORTNESS OF BREATH: 1

## 2024-11-20 NOTE — ANESTHESIA POSTPROCEDURE EVALUATION
Department of Anesthesiology  Postprocedure Note    Patient: Jenny Iqbal  MRN: [id number]  YOB: 1960  Date of evaluation: 11/20/2024    Procedure Summary       Date: 11/20/24 Room / Location: [facility] Cardiology    Anesthesia Start: 1324 Anesthesia Stop: 1401    Procedure: ALAN W/ POSSIBLE CARDIOVERSION (PRN CONTRAST/BUBBLE/3D) Diagnosis: Paroxysmal atrial fibrillation (HCC)    Scheduled Providers: Tresa Grover MD Responsible Provider: Luis Carr APRN - CRNA    Anesthesia Type: TIVA ASA Status: 3            Anesthesia Type: TIVA    Marlys Phase I: Marlys Score: 10    Marlys Phase II:      Anesthesia Post Evaluation    Patient location during evaluation: PACU  Patient participation: complete - patient participated  Level of consciousness: awake and alert  Pain score: 1  Airway patency: patent  Nausea & Vomiting: no nausea and no vomiting  Cardiovascular status: blood pressure returned to baseline and hemodynamically stable  Respiratory status: acceptable, face mask, nonlabored ventilation and spontaneous ventilation  Hydration status: euvolemic  Multimodal analgesia pain management approach  Pain management: adequate        No notable events documented.

## 2024-11-20 NOTE — CARE COORDINATION
11/20/24 0855   Service Assessment   Patient Orientation Alert and Oriented   Cognition Alert   History Provided By Medical Record   Primary Caregiver Self   Support Systems Spouse/Significant Other;Children   Patient's Healthcare Decision Maker is: Legal Next of Kin   PCP Verified by CM Yes   Prior Functional Level Independent in ADLs/IADLs   Current Functional Level Independent in ADLs/IADLs   Can patient return to prior living arrangement Unknown at present   Ability to make needs known: Good   Family able to assist with home care needs: Yes   Would you like for me to discuss the discharge plan with any other family members/significant others, and if so, who? No   Financial Resources Medicare   Community Resources None     Chart reviewed, screened for discharge planning.  Pt is from home with spouse.  No discharge needs identified at this time.  CM following

## 2024-11-20 NOTE — ANESTHESIA PRE PROCEDURE
11/20/2024 08:07 AM    LABGLOM >60 09/28/2023 10:12 AM    GLUCOSE 127 11/20/2024 08:07 AM    CALCIUM 9.2 11/20/2024 08:07 AM    BILITOT 0.6 11/20/2024 08:07 AM    ALKPHOS 80 11/20/2024 08:07 AM    AST 24 11/20/2024 08:07 AM    ALT 20 11/20/2024 08:07 AM       POC Tests: No results for input(s): \"POCGLU\", \"POCNA\", \"POCK\", \"POCCL\", \"POCBUN\", \"POCHEMO\", \"POCHCT\" in the last 72 hours.    Coags:   Lab Results   Component Value Date/Time    PROTIME 16.1 12/06/2019 01:13 PM    INR 1.33 12/06/2019 01:13 PM    APTT 39.3 12/06/2019 01:13 PM       HCG (If Applicable): No results found for: \"PREGTESTUR\", \"PREGSERUM\", \"HCG\", \"HCGQUANT\"     ABGs:   Lab Results   Component Value Date/Time    PO2ART 205.3 12/09/2019 09:09 AM    IEY2PFA 37.0 12/09/2019 09:09 AM    GWU1JBW 23.2 12/09/2019 09:09 AM        Type & Screen (If Applicable):  Lab Results   Component Value Date    ABORH O POSITIVE 12/09/2019    LABANTI NEGATIVE 12/09/2019       Drug/Infectious Status (If Applicable):  No results found for: \"HIV\", \"HEPCAB\"    COVID-19 Screening (If Applicable): No results found for: \"COVID19\"        Anesthesia Evaluation    Airway: Mallampati: III          Dental:          Pulmonary:   (+)   shortness of breath:   sleep apnea:                                  Cardiovascular:    (+) hypertension:        Rhythm: irregular                      Neuro/Psych:               GI/Hepatic/Renal:             Endo/Other:                     Abdominal:             Vascular:          Other Findings:             Anesthesia Plan      TIVA     ASA 3       Induction: intravenous.      Anesthetic plan and risks discussed with patient.                        Luis Carr, APRN - LUIS   11/20/2024

## 2024-11-21 ENCOUNTER — TELEPHONE (OUTPATIENT)
Dept: PULMONOLOGY | Age: 64
End: 2024-11-21

## 2024-11-21 ENCOUNTER — APPOINTMENT (OUTPATIENT)
Dept: NON INVASIVE DIAGNOSTICS | Age: 64
DRG: 309 | End: 2024-11-21
Payer: MEDICARE

## 2024-11-21 VITALS
WEIGHT: 206 LBS | DIASTOLIC BLOOD PRESSURE: 62 MMHG | BODY MASS INDEX: 32.33 KG/M2 | RESPIRATION RATE: 14 BRPM | HEIGHT: 67 IN | HEART RATE: 69 BPM | OXYGEN SATURATION: 96 % | SYSTOLIC BLOOD PRESSURE: 104 MMHG | TEMPERATURE: 97.4 F

## 2024-11-21 LAB
ECHO BSA: 2.1 M2
ECHO EST RA PRESSURE: 3 MMHG
ECHO LV EDV A4C: 53 ML
ECHO LV EDV INDEX A4C: 26 ML/M2
ECHO LV EF PHYSICIAN: 55 %
ECHO LV EJECTION FRACTION A4C: 69 %
ECHO LV ESV A4C: 17 ML
ECHO LV ESV INDEX A4C: 8 ML/M2
ECHO LV FRACTIONAL SHORTENING: 35 % (ref 28–44)
ECHO LV INTERNAL DIMENSION DIASTOLE INDEX: 1.8 CM/M2
ECHO LV INTERNAL DIMENSION DIASTOLIC: 3.7 CM (ref 3.9–5.3)
ECHO LV INTERNAL DIMENSION SYSTOLIC INDEX: 1.17 CM/M2
ECHO LV INTERNAL DIMENSION SYSTOLIC: 2.4 CM
ECHO LV IVSD: 1 CM (ref 0.6–0.9)
ECHO LV MASS 2D: 129.3 G (ref 67–162)
ECHO LV MASS INDEX 2D: 63.1 G/M2 (ref 43–95)
ECHO LV POSTERIOR WALL DIASTOLIC: 1.2 CM (ref 0.6–0.9)
ECHO LV RELATIVE WALL THICKNESS RATIO: 0.65
ECHO LVOT AREA: 3.1 CM2
ECHO LVOT DIAM: 2 CM
ECHO RIGHT VENTRICULAR SYSTOLIC PRESSURE (RVSP): 24 MMHG
ECHO TV REGURGITANT MAX VELOCITY: 2.28 M/S
ECHO TV REGURGITANT PEAK GRADIENT: 21 MMHG

## 2024-11-21 PROCEDURE — 2580000003 HC RX 258: Performed by: STUDENT IN AN ORGANIZED HEALTH CARE EDUCATION/TRAINING PROGRAM

## 2024-11-21 PROCEDURE — 6370000000 HC RX 637 (ALT 250 FOR IP): Performed by: INTERNAL MEDICINE

## 2024-11-21 PROCEDURE — APPNB60 APP NON BILLABLE TIME 46-60 MINS: Performed by: NURSE PRACTITIONER

## 2024-11-21 PROCEDURE — 93308 TTE F-UP OR LMTD: CPT

## 2024-11-21 RX ORDER — PROPAFENONE HYDROCHLORIDE 150 MG/1
150 TABLET, COATED ORAL EVERY 8 HOURS SCHEDULED
Qty: 90 TABLET | Refills: 3 | Status: SHIPPED | OUTPATIENT
Start: 2024-11-21

## 2024-11-21 RX ORDER — METOPROLOL TARTRATE 25 MG/1
25 TABLET, FILM COATED ORAL 2 TIMES DAILY
Qty: 60 TABLET | Refills: 3 | Status: SHIPPED | OUTPATIENT
Start: 2024-11-21

## 2024-11-21 RX ADMIN — PROPAFENONE HYDROCHLORIDE 150 MG: 150 TABLET, FILM COATED ORAL at 06:22

## 2024-11-21 RX ADMIN — METOPROLOL TARTRATE 25 MG: 25 TABLET, FILM COATED ORAL at 09:45

## 2024-11-21 RX ADMIN — SODIUM CHLORIDE, PRESERVATIVE FREE 20 ML: 5 INJECTION INTRAVENOUS at 09:46

## 2024-11-21 RX ADMIN — PROPAFENONE HYDROCHLORIDE 150 MG: 150 TABLET, FILM COATED ORAL at 14:37

## 2024-11-21 RX ADMIN — APIXABAN 5 MG: 5 TABLET, FILM COATED ORAL at 09:45

## 2024-11-21 ASSESSMENT — ENCOUNTER SYMPTOMS
ABDOMINAL DISTENTION: 0
VOMITING: 0
SHORTNESS OF BREATH: 0
BACK PAIN: 0
NAUSEA: 0
BLOOD IN STOOL: 0
WHEEZING: 0
CHEST TIGHTNESS: 0
PHOTOPHOBIA: 0
DIARRHEA: 0
COUGH: 0
COLOR CHANGE: 0
EYE PAIN: 0
CONSTIPATION: 0
ABDOMINAL PAIN: 0

## 2024-11-21 NOTE — DISCHARGE INSTRUCTIONS
- please schedule an appointment to see your PCP  - please schedule an appointment to see cardiology  - please take medications as prescribed

## 2024-11-21 NOTE — CONSULTS
Electrophysiology Consult Note      Reason for consultation:  atrial fibrillation    Chief complaint : Atrial fibrillation    Referring physician:       Primary care physician: Karissa Branch MD      History of Present Illness:     Jenny Iqbal is a 64 year old female with a history of paroxysmal atrial fibrillation s/p ablation in 2019, hyperlipidemia, ANISA on CPAP, HTN, LV dysfunction, obesity BMI 32.   She presents with complaints of atrial fibrillation  Patient reports that she was at the pulmonologist when she was told that her heart rate was fast.  She states that she was then immediately told to go to the [facility] for an echo.  Patient was found to be in atrial fibrillation with RVR  Patient was sent to the emergency room.  Patient reports that she does not know when she is in atrial fibrillation.  She denies chest pain, palpitations, shortness of breath, lightheadedness, dizziness, edema or syncope  Patient had ablation for atrial fibrillation in 2019.  At that time patient had isolation of pulmonary veins with posterior wall isolation and posterior floor line and roofline ablation.  At that time she was on amiodarone and after 3-month blanking period amiodarone was discontinued.  On admission sodium 140, potassium 4.6, creatinine 0.8, magnesium 2.1, proBNP 1369,   On echo EF 45 to 50%  Noted moderate mitral regurgitation, mild tricuspid valve regurgitation, left atrium is moderately dilated.  Patient had left heart cath in August 2019 with normal coronaries noted  Patient has a history of nosebleeds on anticoagulation and is concerned about this  Patient had successful cardioversion yesterday with cardiology  Patient started on Rythmol      Chief Complaint   Patient presents with    Tachycardia     Afib, history of afib, sent by her PCP         Location, Quality, severity, Timing, Duration, context, modifying factors, associated signs and 
History of cardioversion 2019    Impression: Patient is successfully cardioverted into NSR.    History of transesophageal echocardiography (ALAN) 08/15/2019    There is no evidence of thrombus in the Left Atrial Appendage. Negative Buble Study, Mod MR, Mild TR    History of transesophageal echocardiography (ALAN) 2019     Sclerotic aortic valve. Severe TR, Mod MR.    Hyperlipidemia        Surgical history :  Past Surgical History:   Procedure Laterality Date    APPENDECTOMY      ATRIAL ABLATION SURGERY Left 2019    Atrial fib ablation (PVI & post wall)     SECTION      CHOLECYSTECTOMY      HYSTERECTOMY (CERVIX STATUS UNKNOWN)         Family history:   Family History   Problem Relation Age of Onset    High Cholesterol Mother     Heart Surgery Mother     Hypertension Father        Social history :  reports that she has never smoked. She has never used smokeless tobacco. She reports that she does not drink alcohol and does not use drugs.    Allergies   Allergen Reactions    Codeine      Nausea and vomiting        No current facility-administered medications on file prior to encounter.     Current Outpatient Medications on File Prior to Encounter   Medication Sig Dispense Refill    aspirin 81 MG tablet Take 1 tablet by mouth Daily with lunch      acetaminophen (TYLENOL) 500 MG tablet Take 1 tablet by mouth 4 times daily as needed for Pain 40 tablet 0    CPAP Machine MISC by Does not apply route every evening         Review of Systems:   Review of Systems   Constitutional:  Negative for activity change, appetite change and fatigue.   HENT:  Positive for nosebleeds. Negative for congestion.    Eyes:  Negative for photophobia and visual disturbance.   Respiratory:  Negative for cough and shortness of breath.    Cardiovascular:  Negative for chest pain, palpitations and leg swelling.   Gastrointestinal:  Negative for abdominal distention.   Genitourinary:  Negative for difficulty urinating and 
chloride flush 0.9 % injection 5-40 mL, 2 times per day  sodium chloride flush 0.9 % injection 5-40 mL, PRN  0.9 % sodium chloride infusion, PRN  potassium chloride (KLOR-CON M) extended release tablet 40 mEq, PRN   Or  potassium bicarb-citric acid (EFFER-K) effervescent tablet 40 mEq, PRN   Or  potassium chloride 10 mEq/100 mL IVPB (Peripheral Line), PRN  magnesium sulfate 2000 mg in 50 mL IVPB premix, PRN  ondansetron (ZOFRAN-ODT) disintegrating tablet 4 mg, Q8H PRN   Or  ondansetron (ZOFRAN) injection 4 mg, Q6H PRN  polyethylene glycol (GLYCOLAX) packet 17 g, Daily PRN  acetaminophen (TYLENOL) tablet 650 mg, Q6H PRN   Or  acetaminophen (TYLENOL) suppository 650 mg, Q6H PRN      Current Facility-Administered Medications   Medication Dose Route Frequency Provider Last Rate Last Admin    dilTIAZem (CARDIZEM) tablet 30 mg  30 mg Oral 4 times per day Emil Miles PA-C   30 mg at 11/20/24 1249    enoxaparin (LOVENOX) injection 90 mg  1 mg/kg SubCUTAneous BID Emil Miles PA-C        aspirin chewable tablet 81 mg  81 mg Oral Lunch Jessica Villalobos MD   81 mg at 11/20/24 1249    sodium chloride flush 0.9 % injection 5-40 mL  5-40 mL IntraVENous 2 times per day Jessica Villalobos MD   10 mL at 11/20/24 0816    sodium chloride flush 0.9 % injection 5-40 mL  5-40 mL IntraVENous PRN Jessica Villalobos MD        0.9 % sodium chloride infusion   IntraVENous PRN Jessica Villalobos MD        potassium chloride (KLOR-CON M) extended release tablet 40 mEq  40 mEq Oral PRN Jessica Villalobos MD        Or    potassium bicarb-citric acid (EFFER-K) effervescent tablet 40 mEq  40 mEq Oral PRN Jessica Villalobos MD        Or    potassium chloride 10 mEq/100 mL IVPB (Peripheral Line)  10 mEq IntraVENous PRN Jessica Villalobos MD        magnesium sulfate 2000 mg in 50 mL IVPB premix  2,000 mg IntraVENous PRN Jessica Villalobos MD        ondansetron (ZOFRAN-ODT) disintegrating tablet 4 mg  4 mg

## 2024-11-21 NOTE — PROGRESS NOTES
Cardiology Progress Note     Admit Date:  2024    Consult reason/ Seen today for :       Subjective and  Overnight Events : S/p cardioversion in sinus rhythm      Chief complain on admission : 64 y.o.year old who is admitted for  Chief Complaint   Patient presents with    Tachycardia     Afib, history of afib, sent by her PCP      Assessment / Plan:  She does not have any coronary artery disease she can be considered for flecainide or Rythmol long-term  Continue Eliquis, Rythmol 150 mg 3 times a day, metoprolol 25  HTN: stable, continue present medications   Follow-up in office will sign off  DVT prophylaxis if no contraindication  6.   Dyslipidemia: continue statins   Discussed with primary team, hospitalist service, bedside nursing staff and family  Past medical history:    has a past medical history of Atrial fibrillation (HCC), H/O cardiac catheterization, History of cardioversion, History of cardioversion, History of transesophageal echocardiography (ALAN), History of transesophageal echocardiography (ALAN), and Hyperlipidemia.  Past surgical history:   has a past surgical history that includes Cholecystectomy; Appendectomy;  section; Hysterectomy; and Atrial ablation surgery (Left, 2019).  Social History:   reports that she has never smoked. She has never used smokeless tobacco. She reports that she does not drink alcohol and does not use drugs.  Family history:  family history includes Heart Surgery in her mother; High Cholesterol in her mother; Hypertension in her father.    Allergies   Allergen Reactions    Codeine      Nausea and vomiting        Review of Systems:    All 14 systems were reviewed and are negative  Except for the positive findings  which as documented     BP (!) 115/59   Pulse 76   Temp 97.7 °F (36.5 °C) (Oral)   Resp 17   Ht 1.702 m (5' 7\")   Wt 93.4 kg (206 lb)   SpO2 96%   BMI 32.26 
    V2.0  [facility] Hospitalist Progress Note      Name:  Jenny Iqbal /Age/Sex: 1960  (64 y.o. female)   MRN & CSN:  [id number] & [id number] Encounter Date/Time: 2024 1:08 PM EST    Location:  [address] [place] [address] PCP: Karissa Branch MD       Hospital Day: 2      Subjective:     Chief Complaint:  Tachycardia (Afib, history of afib, sent by her PCP)     Dilt stopped overnight with HR reaching 80's and drop in the SBP 80's.  Patient seen and examined at bedside. Noted back in afib 110-120's.   Pt has no symptoms or concerns. Reports being compliant with CPAP but mask was noted to be leaking so it was replaced recently.  Denies chest pain or SOB     Assessment and Plan:    A-fib with RVR: Presented from cardiology clinic, went for routine checkup and echocardiogram, found in A-fib with RVR sent to ED  Hx of afib s/p cardioversionx2 and ablation in the past. Not any meds now.  [id number] Score: 3  Was started on dilt drip, now off due to hypotension  TSH wnl.  - cards consulted  - now on Lovenox therapetuic  - dilt 30 q6h  - plan for ALAN w cardioversion today      Obesity: BMI 32.58    ANISA. On CPAP. Continue    Personally reviewed Lab Studies and Imaging     Discussed management of the case with cardiology who recommended ALAN/cardioversion today    EKG interpreted personally and results afib-rvr    Imaging that was interpreted personally includes CXR and results no acute change    Drugs that require monitoring for toxicity include lovenox and the method of monitoring was cbc      Diet Diet NPO   DVT Prophylaxis [x] Lovenox, []  Heparin, [] SCDs, [] Ambulation,  [] Eliquis, [] Xarelto  [] Coumadin   Code Status Full Code   Disposition From: home  Expected Disposition: home  Estimated Date of Discharge: 1-2 days  Patient requires continued admission due to ALAN today   Surrogate Decision Maker/ POA      Review of Systems:    Review of Systems    See above    Objective:     Intake/Output Summary (Last 24 hours) at 
  Physician Progress Note      PATIENT:               LAWSON PAUL  CSN #:                  [id number]  :                       1960  ADMIT DATE:       2024 11:59 AM  DISCH DATE:  RESPONDING  PROVIDER #:        Eugene Mullins MD          QUERY TEXT:    Dear Dr. Mullins,  Patient admitted with A-fib with RVR, noted to have history of paroxysmal   atrial fibrillation and is maintained on Eliquis. If possible, please document   in progress notes and discharge summary if you are evaluating and/or treating   any of the following:?  ?  The medical record reflects the following:  Risk Factors: Hx obesity, HLD, ANISA on CPAP, paroxysmal A-fib, Hx ablation,   Current had A-fib RVR,  Clinical Indicators:  Admitted for A-fib RVR.  PCP notes   \"[id number] Score: 3\"  Treatment: ALAN, Cardioversion, \"continue anticoagulant, Eliquis 5mg BID  Thank you,  Allie Patel RN, CDS  Options provided:  -- Secondary hypercoagulable state in a patient with atrial fibrillation  -- Other - I will add my own diagnosis  -- Disagree - Not applicable / Not valid  -- Disagree - Clinically unable to determine / Unknown  -- Refer to Clinical Documentation Reviewer    PROVIDER RESPONSE TEXT:    This patient has secondary hypercoagulable state in a patient with atrial   fibrillation.    Query created by: Allie Glass on 2024 10:31 AM      Electronically signed by:  Eugene Mullins MD 2024 11:28 AM          
4 Eyes Skin Assessment     NAME:  Jenny Iqbal  YOB: 1960  MEDICAL RECORD NUMBER:  [id number]    The patient is being assessed for  Admission    I agree that at least one RN has performed a thorough Head to Toe Skin Assessment on the patient. ALL assessment sites listed below have been assessed.      Areas assessed by both nurses:    Head, Face, Ears, Shoulders, Back, Chest, Arms, Elbows, Hands, Sacrum. Buttock, Coccyx, Ischium, Legs. Feet and Heels, and Under Medical Devices         Does the Patient have a Wound? No noted wound(s)       Kamran Prevention initiated by RN: No  Wound Care Orders initiated by RN: No    Pressure Injury (Stage 3,4, Unstageable, DTI, NWPT, and Complex wounds) if present, place Wound referral order by RN under : No    New Ostomies, if present place, Ostomy referral order under : No     Nurse 1 eSignature: Electronically signed by Sera Buckley RN on 11/19/24 at 3:47 PM EST    **SHARE this note so that the co-signing nurse can place an eSignature**    Nurse 2 eSignature: Electronically signed by Beata Torrez RN on 11/19/24 at 3:48 PM EST   
thickness. Normal wall motion. Indeterminate diastolic function due to atrial fibrillation.    Mitral Valve: Moderate regurgitation.    Tricuspid Valve: Mild regurgitation. The estimated RVSP is 33 mmHg.    Left Atrium: Left atrium is moderately dilated.    Pericardium: No pericardial effusion.    Image quality is good. Technically difficult study due to patient's heart rhythm.    Emil Miles PA-C 11/20/24 12:09 PM

## 2024-11-21 NOTE — CARE COORDINATION
RN requested Eliquis coupon. CM printed from Pharmacy patient assistance tab on main [facility] page. Given to RN.

## 2024-11-21 NOTE — TELEPHONE ENCOUNTER
Patients CPAP mask has been causing leakage, [facility]

## 2024-11-22 ENCOUNTER — TELEPHONE (OUTPATIENT)
Dept: CARDIOLOGY CLINIC | Age: 64
End: 2024-11-22

## 2024-11-22 ENCOUNTER — CARE COORDINATION (OUTPATIENT)
Dept: CASE MANAGEMENT | Age: 64
End: 2024-11-22

## 2024-11-22 NOTE — CARE COORDINATION
Care Transitions Note    Initial Call - Call within 2 business days of discharge: Yes    Attempted to reach patient for transitions of care follow up. Unable to reach patient.    Outreach Attempts:   Multiple attempts to contact spouse/partner  at phone numbers on file.   HIPAA compliant voicemail left for patient.   Mychart message sent.   Unable to leave message w/ spouse VM was full  Patient: Jenny Iqbal    Patient : 1960   MRN: [id number]    Reason for Admission: DX: Afib RVR  [facility] : -24  Discharge Date: 24  RURS: Readmission Risk Score: 2.9    Last Discharge Facility       Date Complaint Diagnosis Description Type Department Provider    24 Tachycardia Atrial fibrillation with rapid ventricular response (HCC) ... [facility]; Ceci Hartman,...                    Plan for follow-up on next business day.      Marlene Shay

## 2024-11-22 NOTE — TELEPHONE ENCOUNTER
Patient education completed on Eliquis, she is scared of blooding. Denied a sooner OV with Dr Jackson, she is bringing a list of questions to her OV. Patient will call us if she changes her mind on the sooner OV

## 2024-11-22 NOTE — TELEPHONE ENCOUNTER
Pt was in hospital and had to have her heart shocked back into rhythm. She is scared to be on blood thinner Eliquis and is wanting to know if she really needs to be on this? She thought it was for A-fib only and is scared to take it. Also states they took her off of all her vitamins. Pt was put on Metoprolol and Rythmol for her heart.

## 2024-11-25 ENCOUNTER — TELEPHONE (OUTPATIENT)
Dept: FAMILY MEDICINE CLINIC | Age: 64
End: 2024-11-25

## 2024-11-25 ENCOUNTER — CARE COORDINATION (OUTPATIENT)
Dept: CASE MANAGEMENT | Age: 64
End: 2024-11-25

## 2024-11-25 NOTE — TELEPHONE ENCOUNTER
Care Transitions Initial Follow Up Call    Outreach made within 2 business days of discharge: Yes    Patient: Jenny Iqbal Patient : 1960   MRN: [id number]  Reason for Admission: 2024  Discharge Date: 24       Spoke with: Jenny    Discharge department/facility: [facility] Interactive Patient Contact:  Was patient able to fill all prescriptions: Yes  Was patient instructed to bring all medications to the follow-up visit: Yes  Is patient taking all medications as directed in the discharge summary? Yes  Does patient understand their discharge instructions: Yes  Does patient have questions or concerns that need addressed prior to 7-14 day follow up office visit: no    Additional needs identified to be addressed with provider     Patient doesn't need follow up with PCP has a follow up with Cariology on Dec 10th at 8:30 am            Scheduled appointment with PCP within 7-14 days    Follow Up  Future Appointments   Date Time Provider Department Center   12/10/2024  8:30 AM Farhana Jackson MD [facility]   10/9/2025  9:00 AM Karissa Branch MD [facility]   2025  8:00 AM Farhana Jackson MD [facility]   2025  9:30 AM [facility]       Annita Theodore MA

## 2024-11-25 NOTE — CARE COORDINATION
Care Transitions Note    Initial Call - Call within 2 business days of discharge: Yes    Attempted to reach patient for transitions of care follow up. Unable to reach patient.    Outreach Attempts:   HIPAA compliant voicemail left for patient.     Patient: Jenny Iqbal    Patient : 1960   MRN: [id number]    Reason for Admission: DX: Afib RVR  [facility] : -24  Discharge Date: 24  RURS: Readmission Risk Score: 2.9    Last Discharge Facility       Date Complaint Diagnosis Description Type Department Provider    24 Tachycardia Atrial fibrillation with rapid ventricular response (HCC) ... ED [facility]; Ceci Hartman,...            Future Appointments         Provider Specialty Dept Phone    12/10/2024 8:30 AM Farhana Jackson MD Cardiology [phone]    10/9/2025 9:00 AM Karissa Branch MD Family Medicine [phone]    2025 8:00 AM Farhana Jackson MD Cardiology [phone]    2025 9:30 AM Tyler Sandy MD Pulmonology [phone]              Marlene Shay

## 2024-12-04 ENCOUNTER — TELEPHONE (OUTPATIENT)
Dept: CARDIOLOGY CLINIC | Age: 64
End: 2024-12-04

## 2024-12-04 NOTE — TELEPHONE ENCOUNTER
She needs to remain on Eliquis for stroke prevention with [facility]s-Vas is 3. She can stop ASA and only take Eliquis 5 mg BID.

## 2024-12-04 NOTE — TELEPHONE ENCOUNTER
Patient called she has decided she wants   To go back to Asprin and go off Eliquis  She had a paper cut that bleed for a while  Makes her very nervious please advise.

## 2024-12-05 NOTE — TELEPHONE ENCOUNTER
Spoke with patient she states being on any blood thinner makes her nervous. She has a f/u on 12/10/24 and will talk to provider about blood thinners at the OV

## 2024-12-10 ENCOUNTER — OFFICE VISIT (OUTPATIENT)
Dept: CARDIOLOGY CLINIC | Age: 64
End: 2024-12-10
Payer: MEDICARE

## 2024-12-10 VITALS
DIASTOLIC BLOOD PRESSURE: 70 MMHG | SYSTOLIC BLOOD PRESSURE: 122 MMHG | HEART RATE: 60 BPM | WEIGHT: 206.8 LBS | HEIGHT: 67 IN | BODY MASS INDEX: 32.46 KG/M2

## 2024-12-10 DIAGNOSIS — I48.0 PAROXYSMAL ATRIAL FIBRILLATION (HCC): Primary | ICD-10-CM

## 2024-12-10 PROCEDURE — 93000 ELECTROCARDIOGRAM COMPLETE: CPT | Performed by: INTERNAL MEDICINE

## 2024-12-10 PROCEDURE — 1123F ACP DISCUSS/DSCN MKR DOCD: CPT | Performed by: INTERNAL MEDICINE

## 2024-12-10 PROCEDURE — 99214 OFFICE O/P EST MOD 30 MIN: CPT | Performed by: INTERNAL MEDICINE

## 2024-12-10 PROCEDURE — 3074F SYST BP LT 130 MM HG: CPT | Performed by: INTERNAL MEDICINE

## 2024-12-10 PROCEDURE — 3078F DIAST BP <80 MM HG: CPT | Performed by: INTERNAL MEDICINE

## 2024-12-10 PROCEDURE — 1159F MED LIST DOCD IN RCRD: CPT | Performed by: INTERNAL MEDICINE

## 2024-12-10 RX ORDER — METOPROLOL TARTRATE 25 MG/1
25 TABLET, FILM COATED ORAL 2 TIMES DAILY
Qty: 60 TABLET | Refills: 5 | Status: SHIPPED | OUTPATIENT
Start: 2024-12-10

## 2024-12-10 RX ORDER — PROPAFENONE HYDROCHLORIDE 150 MG/1
150 TABLET, COATED ORAL EVERY 8 HOURS SCHEDULED
Qty: 90 TABLET | Refills: 5 | Status: SHIPPED | OUTPATIENT
Start: 2024-12-10

## 2024-12-10 NOTE — PATIENT INSTRUCTIONS
Please be informed that if you contact our office outside of normal business hours the physician on call cannot help with any scheduling or rescheduling issues, procedure instruction questions or any type of medication issue.    We advise you for any urgent/emergency that you go to the nearest emergency room!    PLEASE CALL OUR OFFICE DURING NORMAL BUSINESS HOURS    Monday - Friday   8 am to 5 pm    [place]: [phone]    [place]: [phone]    [place]:  [phone]  Thank you for allowing us to care for you today!   We want to ensure we can follow your treatment plan and we strive to give you the best outcomes and experience possible.   If you ever have a life threatening emergency and call 911 - for an ambulance (EMS)   Our providers can only care for you at:   [facility] or [facility].   Even if you have someone take you or you drive yourself we can only care for you in a [facility] facility. Our providers are not setup at the other healthcare locations!   **It is YOUR responsibilty to bring medication bottles and/or updated medication list to EACH APPOINTMENT. This will allow us to better serve you and all your healthcare needs**  We are committed to providing you the best care possible.    If you receive a survey after visiting one of our offices, please take time to share your experience concerning your physician office visit.  These surveys are confidential and no health information about you is shared.    We are eager to improve for you and we are counting on your feedback to help make that happen.

## 2024-12-10 NOTE — PROGRESS NOTES
recommend to lose weight  GERD: continue PPI  All labs, medications and tests reviewed, continue all other medications of all above medical condition listed as is.

## 2025-01-21 ENCOUNTER — TELEPHONE (OUTPATIENT)
Dept: CARDIOLOGY CLINIC | Age: 65
End: 2025-01-21

## 2025-02-06 NOTE — PROGRESS NOTES
Patient continues to be rate controlled with HR in 80's Afib since Cardizem gtt discontinued. Will continue to monitor patient. This encounter is being sent to inform the clinic that this patient has a referral from Gus Estevez MD in [facility] for the diagnoses of Actinic keratosis; multiple AKs arms ? 5FU and has requested that this patient be seen within  and/or with Priority: 1-2 Weeks. Based on the availability of our provider(s), we are unable to accommodate this request.    Were all sites offered this patient?  Yes  Pt wants [facility] only  Does scheduling algorithm request to schedule next available?  Patient has been scheduled for the first available opening with Lor Key PA-C at [facility] on 09/10/2025.  We have informed the patient that the clinic will review their referral and reach out if a sooner appointment is medically necessary.       Pt requesting call back with anything sooner at all - Thanks!

## 2025-03-13 ENCOUNTER — HOSPITAL ENCOUNTER (EMERGENCY)
Age: 65
Discharge: HOME | End: 2025-03-13
Attending: STUDENT IN AN ORGANIZED HEALTH CARE EDUCATION/TRAINING PROGRAM
Payer: MEDICARE

## 2025-03-13 ENCOUNTER — TELEPHONE (OUTPATIENT)
Dept: CARDIOLOGY CLINIC | Age: 65
End: 2025-03-13

## 2025-03-13 VITALS
HEART RATE: 101 BPM | RESPIRATION RATE: 16 BRPM | OXYGEN SATURATION: 93 % | TEMPERATURE: 98 F | HEIGHT: 67 IN | SYSTOLIC BLOOD PRESSURE: 115 MMHG | DIASTOLIC BLOOD PRESSURE: 67 MMHG | BODY MASS INDEX: 32.65 KG/M2 | WEIGHT: 208 LBS

## 2025-03-13 DIAGNOSIS — R00.0 SINUS TACHYCARDIA: Primary | ICD-10-CM

## 2025-03-13 LAB
ALBUMIN SERPL-MCNC: 4.2 G/DL (ref 3.4–5)
ALBUMIN/GLOB SERPL: 1.3 {RATIO} (ref 1.1–2.2)
ALP SERPL-CCNC: 80 U/L (ref 40–129)
ALT SERPL-CCNC: 23 U/L (ref 10–40)
ANION GAP SERPL CALCULATED.3IONS-SCNC: 13 MMOL/L (ref 9–17)
AST SERPL-CCNC: 25 U/L (ref 15–37)
BASOPHILS # BLD: 0.03 K/UL
BASOPHILS NFR BLD: 1 % (ref 0–1)
BILIRUB SERPL-MCNC: 0.4 MG/DL (ref 0–1)
BUN SERPL-MCNC: 21 MG/DL (ref 7–20)
CALCIUM SERPL-MCNC: 9.2 MG/DL (ref 8.3–10.6)
CHLORIDE SERPL-SCNC: 104 MMOL/L (ref 99–110)
CO2 SERPL-SCNC: 23 MMOL/L (ref 21–32)
CREAT SERPL-MCNC: 1 MG/DL (ref 0.6–1.2)
EKG ATRIAL RATE: 107 BPM
EKG DIAGNOSIS: NORMAL
EKG P AXIS: 83 DEGREES
EKG P-R INTERVAL: 126 MS
EKG Q-T INTERVAL: 324 MS
EKG QRS DURATION: 76 MS
EKG QTC CALCULATION (BAZETT): 432 MS
EKG R AXIS: 21 DEGREES
EKG T AXIS: -15 DEGREES
EKG VENTRICULAR RATE: 107 BPM
EOSINOPHIL # BLD: 0.23 K/UL
EOSINOPHILS RELATIVE PERCENT: 4 % (ref 0–3)
ERYTHROCYTE [DISTWIDTH] IN BLOOD BY AUTOMATED COUNT: 12.3 % (ref 11.7–14.9)
GFR, ESTIMATED: 57 ML/MIN/1.73M2
GLUCOSE SERPL-MCNC: 131 MG/DL (ref 74–99)
HCT VFR BLD AUTO: 48.8 % (ref 37–47)
HGB BLD-MCNC: 15.8 G/DL (ref 12.5–16)
IMM GRANULOCYTES # BLD AUTO: 0.02 K/UL
IMM GRANULOCYTES NFR BLD: 0 %
LYMPHOCYTES NFR BLD: 1.9 K/UL
LYMPHOCYTES RELATIVE PERCENT: 31 % (ref 24–44)
MAGNESIUM SERPL-MCNC: 2 MG/DL (ref 1.8–2.4)
MCH RBC QN AUTO: 30.2 PG (ref 27–31)
MCHC RBC AUTO-ENTMCNC: 32.4 G/DL (ref 32–36)
MCV RBC AUTO: 93.1 FL (ref 78–100)
MONOCYTES NFR BLD: 0.48 K/UL
MONOCYTES NFR BLD: 8 % (ref 0–4)
NEUTROPHILS NFR BLD: 56 % (ref 36–66)
NEUTS SEG NFR BLD: 3.44 K/UL
PLATELET # BLD AUTO: 244 K/UL (ref 140–440)
PMV BLD AUTO: 9.6 FL (ref 7.5–11.1)
POTASSIUM SERPL-SCNC: 4.2 MMOL/L (ref 3.5–5.1)
PROT SERPL-MCNC: 7.3 G/DL (ref 6.4–8.2)
RBC # BLD AUTO: 5.24 M/UL (ref 4.2–5.4)
SODIUM SERPL-SCNC: 139 MMOL/L (ref 136–145)
TROPONIN I SERPL HS-MCNC: 21 NG/L (ref 0–14)
TROPONIN I SERPL HS-MCNC: 23 NG/L (ref 0–14)
WBC OTHER # BLD: 6.1 K/UL (ref 4–10.5)

## 2025-03-13 PROCEDURE — 93010 ELECTROCARDIOGRAM REPORT: CPT | Performed by: INTERNAL MEDICINE

## 2025-03-13 PROCEDURE — 85025 COMPLETE CBC W/AUTO DIFF WBC: CPT

## 2025-03-13 PROCEDURE — 99284 EMERGENCY DEPT VISIT MOD MDM: CPT

## 2025-03-13 PROCEDURE — 83735 ASSAY OF MAGNESIUM: CPT

## 2025-03-13 PROCEDURE — 93005 ELECTROCARDIOGRAM TRACING: CPT | Performed by: STUDENT IN AN ORGANIZED HEALTH CARE EDUCATION/TRAINING PROGRAM

## 2025-03-13 PROCEDURE — 80053 COMPREHEN METABOLIC PANEL: CPT

## 2025-03-13 PROCEDURE — 84484 ASSAY OF TROPONIN QUANT: CPT

## 2025-03-13 NOTE — ED NOTES
Pt reports elevated heart rate of 106- 110 range since taking a walk last night. Pt denies chest pain, palpitations, dizziness or shortness of breath.

## 2025-03-13 NOTE — TELEPHONE ENCOUNTER
Patient called she went to the ed this morning   Her heart rate has been elevated since   Yesterday evening  when she went  To bed , this morning 4:30 am still 106.  Has appt next week .

## 2025-03-13 NOTE — ED PROVIDER NOTES
EMERGENCY DEPARTMENT HISTORY AND PHYSICAL EXAM      Patient Name: Jenny Iqbal  MRN: [id number]  : 1960  Date of Evaluation: 3/13/2025  ED Provider:  Timur Hodges DO    History of Presenting Illness     Chief Complaint:   Chief Complaint   Patient presents with    Tachycardia     Pt reports she went for a walk last night and noticed her HR was elevated @ 107 and will not come down. Pt reports her PCP office is closed and she wants to make sure nothing is wrong        HPI: Patient is a 65 y.o. female with history of atrial fibrillation status post ablation and multiple cardioversions presenting the emergency department given concerns of elevated heart rate.  Patient states last night she noticed some mild central chest pressure that was very brief.  Patient states she checked her heart rate and noticed that it was elevated in the low 100s.  Patient states she went to sleep and woke up this morning and her heart rate was still elevated in the low 100s.  Patient states she did not take her home medications this morning.  Patient denies any current chest pain or shortness of breath.  Patient denies any worsening weakness or fatigue.  Patient denies recent illness including fever, chills, cough, headache, dizziness, abdominal pain, nausea, vomiting, diarrhea, dysuria.      Past History     Past Medical History:   Past Medical History:   Diagnosis Date    Atrial fibrillation (HCC)     H/O cardiac catheterization 08/15/2019     normal coronaries, normal LVEF    History of cardioversion 08/15/2019    Patient is successfully cardioverted into NSR.    History of cardioversion 2019    Impression: Patient is successfully cardioverted into NSR.    History of transesophageal echocardiography (ALAN) 08/15/2019    There is no evidence of thrombus in the Left Atrial Appendage. Negative Buble Study, Mod MR, Mild TR    History of transesophageal echocardiography (ALAN) 2019     Sclerotic aortic valve. Severe

## 2025-03-13 NOTE — DISCHARGE INSTRUCTIONS
Go to scheduled follow-up appoint with cardiology next week.  Return to emergency department if you develop any new or worsening symptoms.

## 2025-03-13 NOTE — TELEPHONE ENCOUNTER
Spoke with patient she feels good. No chest pain, palpitation, or SOB She will keep her OV on 3/18/25. Current /86 .

## 2025-03-18 ENCOUNTER — TELEPHONE (OUTPATIENT)
Dept: CARDIOLOGY CLINIC | Age: 65
End: 2025-03-18

## 2025-03-18 ENCOUNTER — OFFICE VISIT (OUTPATIENT)
Dept: CARDIOLOGY CLINIC | Age: 65
End: 2025-03-18
Payer: MEDICARE

## 2025-03-18 VITALS
BODY MASS INDEX: 33.12 KG/M2 | HEIGHT: 67 IN | WEIGHT: 211 LBS | HEART RATE: 116 BPM | DIASTOLIC BLOOD PRESSURE: 84 MMHG | SYSTOLIC BLOOD PRESSURE: 138 MMHG

## 2025-03-18 DIAGNOSIS — I48.0 PAROXYSMAL ATRIAL FIBRILLATION (HCC): ICD-10-CM

## 2025-03-18 DIAGNOSIS — R00.0 TACHYCARDIA: Primary | ICD-10-CM

## 2025-03-18 PROCEDURE — 1159F MED LIST DOCD IN RCRD: CPT | Performed by: INTERNAL MEDICINE

## 2025-03-18 PROCEDURE — 1123F ACP DISCUSS/DSCN MKR DOCD: CPT | Performed by: INTERNAL MEDICINE

## 2025-03-18 PROCEDURE — 99214 OFFICE O/P EST MOD 30 MIN: CPT | Performed by: INTERNAL MEDICINE

## 2025-03-18 PROCEDURE — 3079F DIAST BP 80-89 MM HG: CPT | Performed by: INTERNAL MEDICINE

## 2025-03-18 PROCEDURE — 3075F SYST BP GE 130 - 139MM HG: CPT | Performed by: INTERNAL MEDICINE

## 2025-03-18 RX ORDER — IVABRADINE 5 MG/1
5 TABLET, FILM COATED ORAL 2 TIMES DAILY WITH MEALS
Qty: 60 TABLET | Refills: 3 | Status: SHIPPED | OUTPATIENT
Start: 2025-03-18

## 2025-03-18 RX ORDER — METOPROLOL TARTRATE 25 MG/1
25 TABLET, FILM COATED ORAL 2 TIMES DAILY
Qty: 90 TABLET | Refills: 3 | Status: SHIPPED | OUTPATIENT
Start: 2025-03-18

## 2025-03-18 RX ORDER — METOPROLOL TARTRATE 50 MG
50 TABLET ORAL 2 TIMES DAILY
Qty: 90 TABLET | Refills: 3 | Status: SHIPPED | OUTPATIENT
Start: 2025-03-18 | End: 2025-03-18

## 2025-03-18 NOTE — TELEPHONE ENCOUNTER
Approved today by [employer] 2017  PA Case: [id number], Status: Approved, Coverage Starts on: 1/1/2025 12:00:00 AM, Coverage Ends on: 3/18/2026 12:00:00 AM.  Effective Date: 1/1/2025  Authorization Expiration Date: 3/18/2026

## 2025-03-18 NOTE — PATIENT INSTRUCTIONS
Thank you for allowing us to care for you today!   We want to ensure we can follow your treatment plan and we strive to give you the best outcomes and experience possible.   If you ever have a life threatening emergency and call 911 - for an ambulance (EMS)  REMEMBER  Our providers can only care for you at:   [facility] or [facility]   Even if you have someone take you or you drive yourself we can only care for you in a [facility] facility. Our providers are not setup at the other healthcare locations!    PLEASE CALL OUR OFFICE DURING NORMAL BUSINESS HOURS  Monday through Friday 8 am to 5 pm  AFTER HOURS the physician on-call cannot help with scheduling, rescheduling, procedure instruction questions or any type of medication need or issue.  [facility] P:[phone] - [facility] P:[phone] - [facility] P:[phone]      If you receive a survey:  We would appreciate you taking the time to share your experience concerning your provider visit in the office.    These surveys are confidential!  We are eager to improve and are counting on you to share your feedback so we can ensure you get the best care possible.

## 2025-03-18 NOTE — PROGRESS NOTES
CLINICAL STAFF DOCUMENTATION    Dr. Farhana Iqbal  1960  [id number]    Have you had any Chest Pain recently? - No        Have you had any Shortness of Breath - No    Have you had any dizziness - No    Have you had any palpitations recently? - No    Do you have any edema - swelling in No      Is the patient on any of the following medications - Rhythmol (Propafenone)  If Yes DO EKG - Needs done every 3 months    When did you have your last labs drawn 3/25  What doctor ordered Carroll  Do we have the labs in their chart Yes    Do you have a surgery or procedure scheduled in the near future - No      Do use tobacco products? - No  Do you drink alcohol? - No  Do you use any illicit drugs? - No  Caffeine? - Yes, but hasn't had caffeine since ER visit 3/13/25  How much caffeine? .1  cups daily.

## 2025-03-18 NOTE — PROGRESS NOTES
Farhana Jackson MD        OFFICE  FOLLOWUP NOTE    Chief complaints: patient is here for management of  PAF, Sleep apnea, obesity, GERD     Subjective: patient feels better, no chest pain, no shortness of breath, no dizziness, no palpitations    HPI Jenny is a 65 y.o.year old who  has a past medical history of Atrial fibrillation (HCC), H/O cardiac catheterization, History of cardioversion, History of cardioversion, History of transesophageal echocardiography (ALAN), History of transesophageal echocardiography (ALAN), and Hyperlipidemia. and presents for management of  PAF, Sleep apnea, obesity, GERD  which are well controlled      Current Outpatient Medications   Medication Sig Dispense Refill    apixaban (ELIQUIS) 5 MG TABS tablet Take 1 tablet by mouth 2 times daily 60 tablet 5    metoprolol tartrate (LOPRESSOR) 25 MG tablet Take 1 tablet by mouth 2 times daily 60 tablet 5    propafenone (RYTHMOL) 150 MG tablet Take 1 tablet by mouth every 8 hours 90 tablet 5    acetaminophen (TYLENOL) 500 MG tablet Take 1 tablet by mouth 4 times daily as needed for Pain 40 tablet 0    CPAP Machine MISC by Does not apply route every evening       No current facility-administered medications for this visit.     Allergies: Codeine  Past Medical History:   Diagnosis Date    Atrial fibrillation (HCC)     H/O cardiac catheterization 08/15/2019     normal coronaries, normal LVEF    History of cardioversion 08/15/2019    Patient is successfully cardioverted into NSR.    History of cardioversion 08/27/2019    Impression: Patient is successfully cardioverted into NSR.    History of transesophageal echocardiography (ALAN) 08/15/2019    There is no evidence of thrombus in the Left Atrial Appendage. Negative Buble Study, Mod MR, Mild TR    History of transesophageal echocardiography (ALAN) 08/27/2019     Sclerotic aortic valve. Severe TR, Mod MR.    Hyperlipidemia      Past Surgical History:   Procedure Laterality Date

## 2025-04-14 ENCOUNTER — HOSPITAL ENCOUNTER (OUTPATIENT)
Dept: LAB | Age: 65
Discharge: HOME | End: 2025-04-14
Payer: MEDICARE

## 2025-04-14 DIAGNOSIS — R00.0 TACHYCARDIA: ICD-10-CM

## 2025-04-14 DIAGNOSIS — I48.0 PAROXYSMAL ATRIAL FIBRILLATION (HCC): ICD-10-CM

## 2025-04-14 LAB
D DIMER PPP FEU-MCNC: <0.27 UG/ML FEU (ref 0–0.46)
T4 FREE SERPL-MCNC: 1.2 NG/DL (ref 0.9–1.8)
TSH SERPL DL<=0.05 MIU/L-ACNC: 2.92 UIU/ML (ref 0.27–4.2)

## 2025-04-14 PROCEDURE — 84439 ASSAY OF FREE THYROXINE: CPT

## 2025-04-14 PROCEDURE — 84443 ASSAY THYROID STIM HORMONE: CPT

## 2025-04-14 PROCEDURE — 85379 FIBRIN DEGRADATION QUANT: CPT

## 2025-04-17 NOTE — PROGRESS NOTES
MRN: [id number]  Name: Jenny Iqbal  : 1960    Insurance: Payor: [facility] MEDICARE /  /  /      Phone #: [phone]  Provider: Klever Jackson MD     Date of Visit: 2025    Reason for visit: 3 week f/u results  Recent Hospitalization Date:    Reason for Hospitalization:    Last EKG: 3/2025   Type of Device:       Vitals BP HR O2% WT HT ORTHO BP LYING ORTHO BP SITTING ORTHO BP SITTING   Today's Findings           Patients work up- Check List     Testing Last Date Completed Date Expected  ([place] [facility]) Additional Notes    MA to document For provider to complete Either MA or Provider    Carotid Duplex  STAT 1 WK 6 MTH       THIS WK 2 WK 1 YEAR     Cardiac CTA  STAT 1 WK 6 MTH       THIS WK 2 WK 1 YEAR     Cardiac CT Calcium scoring  STAT 1 WK 6 MTH       THIS WK 2 WK 1 YEAR     CTA Chest, Abdomen & Pelvis  STAT 1 WK 6 MTH       THIS WK 2 WK 1 YEAR     CT Chest IV w/ Contrast  STAT 1 WK 6 MTH       THIS WK 2 WK 1 YEAR     CT Chest w/o Contrast  STAT 1 WK 6 MTH       THIS WK 2 WK 1 YEAR     CXR 2024 STAT 1 WK 6 MTH       THIS WK 2 WK 1 YEAR     ECHO 2024 Stress Complete Limited     MRI- Cardiac  STAT 1 WK 6 MTH       THIS WK 2 WK 1 YEAR     MUGA Scan  STAT 1 WK 6 MTH       THIS WK 2 WK 1 YEAR     Nuclear Stress  Lexiscan Cardiolite     PFT  STAT 1 WK 6 MTH       THIS WK 2 WK 1 YEAR     Treadmill Stress Test  STAT 1 WK 6 MTH       THIS WK 2 WK 1 YEAR     Vascular Duplex  Lower: Right Left Bilat       Upper: Right Left Bilat     Other Test Not Listed:    Monitors Last Date Completed Day's Request/Ordered     Holter 3/2025 Short term 24 hours 48 hours      Long term 3 days 7 days 14 days   Event   (1-30 days)      Procedures Last Date Performed Procedure Details Date Expected   Additional Notes    ASD Closure        Carotid Angio        Cardioversion        Heart Cath  R L R&L      Peripheral Angio  R L      PFO Closure        PTCA/PCI        ALAN 2024       ALAN/Cardioversion        Venogram

## 2025-04-21 ENCOUNTER — OFFICE VISIT (OUTPATIENT)
Dept: CARDIOLOGY CLINIC | Age: 65
End: 2025-04-21
Payer: MEDICARE

## 2025-04-21 VITALS
DIASTOLIC BLOOD PRESSURE: 60 MMHG | HEIGHT: 67 IN | BODY MASS INDEX: 33.84 KG/M2 | SYSTOLIC BLOOD PRESSURE: 122 MMHG | HEART RATE: 54 BPM | WEIGHT: 215.6 LBS

## 2025-04-21 DIAGNOSIS — I48.0 PAROXYSMAL ATRIAL FIBRILLATION (HCC): ICD-10-CM

## 2025-04-21 DIAGNOSIS — R00.0 TACHYCARDIA: ICD-10-CM

## 2025-04-21 DIAGNOSIS — Z00.00 EXAMINATION: Primary | ICD-10-CM

## 2025-04-21 PROCEDURE — 3078F DIAST BP <80 MM HG: CPT | Performed by: INTERNAL MEDICINE

## 2025-04-21 PROCEDURE — 99214 OFFICE O/P EST MOD 30 MIN: CPT | Performed by: INTERNAL MEDICINE

## 2025-04-21 PROCEDURE — 1159F MED LIST DOCD IN RCRD: CPT | Performed by: INTERNAL MEDICINE

## 2025-04-21 PROCEDURE — 3074F SYST BP LT 130 MM HG: CPT | Performed by: INTERNAL MEDICINE

## 2025-04-21 PROCEDURE — 1123F ACP DISCUSS/DSCN MKR DOCD: CPT | Performed by: INTERNAL MEDICINE

## 2025-04-21 NOTE — PROGRESS NOTES
CLINICAL STAFF DOCUMENTATION    Dr. Farhana Iqbal  1960  [id number]    Have you had any Chest Pain recently? - No  Have you had any Shortness of Breath - No  Have you had any dizziness - No  Have you had any palpitations recently? - No  Do you have any edema - swelling in No      Do you need any prescriptions refilled? - No    Do you have a surgery or procedure scheduled in the near future - No    Do use tobacco products? - No  Do you drink alcohol? - No  Do you use any illicit drugs? - No  Caffeine? - Yes  How much caffeine? .1  cups       Check medication list thoroughly!!! AND RECONCILE OUTSIDE MEDICATIONS  If dose has changed change the entire order not just the MG  BE SURE TO ASK PATIENT IF THEY NEED MEDICATION REFILLS  Verify Pharmacy and update if incorrect    Add to every patient's \"wrap up\" the following dot phrase AFTERVISITCARDIOHEARTHOUSE and ensure we explain this to our patients

## 2025-04-21 NOTE — PROGRESS NOTES
Farhana Jackson MD        OFFICE  FOLLOWUP NOTE    Chief complaints: patient is here for management of PAF, Sleep apnea, obesity, GERD       Subjective: patient feels better, no chest pain, no shortness of breath, no dizziness, no palpitations    HPI Jenny is a 65 y.o.year old who  has a past medical history of Atrial fibrillation (HCC), H/O cardiac catheterization, History of cardioversion, History of cardioversion, History of transesophageal echocardiography (ALAN), History of transesophageal echocardiography (ALAN), and Hyperlipidemia. and presents for management of PAF, Sleep apnea, obesity, GERD  which are well controlled      Current Outpatient Medications   Medication Sig Dispense Refill    metoprolol tartrate (LOPRESSOR) 25 MG tablet Take 1 tablet by mouth 2 times daily 90 tablet 3    ivabradine (CORLANOR) 5 MG TABS tablet Take 1 tablet by mouth 2 times daily (with meals) 60 tablet 3    apixaban (ELIQUIS) 5 MG TABS tablet Take 1 tablet by mouth 2 times daily 60 tablet 5    propafenone (RYTHMOL) 150 MG tablet Take 1 tablet by mouth every 8 hours 90 tablet 5    acetaminophen (TYLENOL) 500 MG tablet Take 1 tablet by mouth 4 times daily as needed for Pain 40 tablet 0    CPAP Machine MISC by Does not apply route every evening       No current facility-administered medications for this visit.     Allergies: Codeine  Past Medical History:   Diagnosis Date    Atrial fibrillation (HCC)     H/O cardiac catheterization 08/15/2019     normal coronaries, normal LVEF    History of cardioversion 08/15/2019    Patient is successfully cardioverted into NSR.    History of cardioversion 08/27/2019    Impression: Patient is successfully cardioverted into NSR.    History of transesophageal echocardiography (ALAN) 08/15/2019    There is no evidence of thrombus in the Left Atrial Appendage. Negative Buble Study, Mod MR, Mild TR    History of transesophageal echocardiography (ALAN) 08/27/2019     Sclerotic aortic

## 2025-05-29 ENCOUNTER — COMMUNITY OUTREACH (OUTPATIENT)
Dept: FAMILY MEDICINE CLINIC | Age: 65
End: 2025-05-29

## 2025-06-17 RX ORDER — PROPAFENONE HYDROCHLORIDE 150 MG/1
150 TABLET, COATED ORAL EVERY 8 HOURS SCHEDULED
Qty: 270 TABLET | Refills: 1 | Status: SHIPPED | OUTPATIENT
Start: 2025-06-17